# Patient Record
Sex: MALE | Race: WHITE | Employment: OTHER | URBAN - METROPOLITAN AREA
[De-identification: names, ages, dates, MRNs, and addresses within clinical notes are randomized per-mention and may not be internally consistent; named-entity substitution may affect disease eponyms.]

---

## 2017-01-23 ENCOUNTER — TELEPHONE (OUTPATIENT)
Dept: FAMILY MEDICINE | Facility: OTHER | Age: 82
End: 2017-01-23

## 2017-01-24 NOTE — TELEPHONE ENCOUNTER
Reason for call:  Form  Reason for Call:  Form, our goal is to have forms completed with 72 hours, however, some forms may require a visit or additional information.    Type of letter, form or note:  medical    Who is the form from?: [employer] (if other please explain)    Where did the form come from: form was faxed in    What clinic location was the form placed at?: [facility] - [phone]    Where the form was placed: [facility]    What number is listed as a contact on the form?:  Fax [phone]       Additional comments: please complete and fax back    Call taken on 1/23/2017 at 6:31 PM by Swapna Thao

## 2017-01-26 NOTE — TELEPHONE ENCOUNTER
Forms have been completed, signed, faxed/mailed, and sent to scanning.  Kiara Do CMA ([facility])

## 2017-02-27 DIAGNOSIS — K21.9 GASTROESOPHAGEAL REFLUX DISEASE WITHOUT ESOPHAGITIS: ICD-10-CM

## 2017-02-27 NOTE — TELEPHONE ENCOUNTER
Pantoprazole 40 mg      Last Written Prescription Date: 05/05/2016  Last Fill Quantity: 90,  # refills: 2   Last Office Visit with [facility], [facility] or [facility] prescribing provider: 10/14/2016

## 2017-03-01 RX ORDER — PANTOPRAZOLE SODIUM 40 MG/1
TABLET, DELAYED RELEASE ORAL
Qty: 90 TABLET | Refills: 2 | Status: SHIPPED | OUTPATIENT
Start: 2017-03-01

## 2017-03-01 NOTE — TELEPHONE ENCOUNTER
Prescription approved per [facility] Refill Protocol.  Leno Phoenix RN

## 2017-03-08 RX ORDER — OMEPRAZOLE 40 MG/1
40 CAPSULE, DELAYED RELEASE ORAL DAILY
Qty: 90 CAPSULE | Refills: 1 | Status: SHIPPED | OUTPATIENT
Start: 2017-03-08 | End: 2017-10-30 | Stop reason: SINTOL

## 2017-03-08 NOTE — TELEPHONE ENCOUNTER
Sent Rx for omeprazole.  Depending on insurance, it's possible they don't cover this class of medication at all since so many options are available OTC.

## 2017-03-08 NOTE — TELEPHONE ENCOUNTER
Spoke with pt and his spouse, they stated the pharmacy told them that the Rx never was sent to the pharmacy and wanted to know why. Verified with pt's spouse that we sent to correct pharmacy and we did. Spoke with Echo at the pharmacy and she stated they did receive this but what has happened is that this medication is not covered anymore and requires a PA or for provider to change medication. She stated several faxes had been sent about this but I have not been able to find any. Provider please review and advise. Please call pt's wife once this has been done to let her know.    Milena Abebe CMA ([facility])

## 2017-03-09 NOTE — TELEPHONE ENCOUNTER
I called patient's wife and she said she will  the medication for her  in the morning.  No further questions at this time.  Lynn Rivero, CMA

## 2017-04-20 ENCOUNTER — TRANSFERRED RECORDS (OUTPATIENT)
Dept: HEALTH INFORMATION MANAGEMENT | Facility: CLINIC | Age: 82
End: 2017-04-20

## 2017-04-20 LAB
ALT SERPL-CCNC: 22 U/L (ref 0–65)
CREAT SERPL-MCNC: 2.2 MG/DL (ref 0.5–1.5)
GFR SERPL CREATININE-BSD FRML MDRD: 28 ML/MIN/1.73M2
GLUCOSE SERPL-MCNC: 128 MG/DL (ref 70–100)
HBA1C MFR BLD: 6.7 % (ref 4.4–6.5)
POTASSIUM SERPL-SCNC: 5.2 MMOL/L (ref 3.5–5)

## 2017-05-15 DIAGNOSIS — E11.42 DIABETIC POLYNEUROPATHY ASSOCIATED WITH TYPE 2 DIABETES MELLITUS (H): ICD-10-CM

## 2017-05-15 NOTE — TELEPHONE ENCOUNTER
Test strips      Last Written Prescription Date: 10/14/16  Last Fill Quantity: 100,  # refills: 3   Last Office Visit with [facility], [facility] or [facility] prescribing provider: 10/14/16

## 2017-05-16 RX ORDER — BLOOD SUGAR DIAGNOSTIC
STRIP MISCELLANEOUS
Qty: 100 EACH | Refills: 1 | Status: SHIPPED | OUTPATIENT
Start: 2017-05-16 | End: 2017-08-28

## 2017-05-16 NOTE — TELEPHONE ENCOUNTER
Prescription approved per [facility] Refill Protocol.  Leno Phoenix, RN, BSN

## 2017-08-28 DIAGNOSIS — E11.42 DIABETIC POLYNEUROPATHY ASSOCIATED WITH TYPE 2 DIABETES MELLITUS (H): ICD-10-CM

## 2017-08-29 NOTE — TELEPHONE ENCOUNTER
ACCU-CHEK BAL PLUS test strip      Last Written Prescription Date: 05/16/17  Last Fill Quantity: 100,  # refills: 1   Last Office Visit with [facility], [facility] or [facility] prescribing provider: 10/14/16

## 2017-08-30 RX ORDER — BLOOD SUGAR DIAGNOSTIC
STRIP MISCELLANEOUS
Qty: 100 EACH | Refills: 3 | Status: SHIPPED | OUTPATIENT
Start: 2017-08-30 | End: 2018-03-12

## 2017-08-30 NOTE — TELEPHONE ENCOUNTER
Prescription approved per [facility] Refill Protocol.    Shaneka Rodney, RN, BSN

## 2017-08-30 NOTE — TELEPHONE ENCOUNTER
Spouse calling to check status of request. Patient has only one strip left.     Lilliana Ceballos  Reception/ Scheduling

## 2017-09-12 ENCOUNTER — TRANSFERRED RECORDS (OUTPATIENT)
Dept: HEALTH INFORMATION MANAGEMENT | Facility: CLINIC | Age: 82
End: 2017-09-12

## 2017-10-25 NOTE — PROGRESS NOTES
SUBJECTIVE:                                                    Aston Linn is a 89 year old male who presents to clinic today for the following health issues:  Not taking pravastatin on his med list - would make his legs ache.    HPI    Diabetes Follow-up      Patient is checking blood sugars:  sometimes 200, it's been staying good    Diabetic concerns: other - neuropathy problems     Symptoms of hypoglycemia (low blood sugar): none     Paresthesias (numbness or burning in feet) or sores: Yes numbness     Date of last diabetic eye exam: This year 2017 Fall      Hyperlipidemia Follow-Up      Rate your low fat/cholesterol diet?: not monitoring fat    Taking statin?  No    Other lipid medications/supplements?:  none    Hypertension Follow-up      Outpatient blood pressures are not being checked.    Low Salt Diet: not monitoring salt    Anxiety Follow-Up    Status since last visit: No change    Other associated symptoms:None    Complicating factors:   Significant life event: No   Current substance abuse: None  Depression symptoms: No  No flowsheet data found.    GAD7      Having arm pain bilaterally, especially with abduction.  Has swelling in his chest wall in the mornings.  Pain is relatively new, but his swelling is longstanding.        Had diarrhea from his PPI.  [facility]      COPD Follow-Up    Symptoms are currently: been using his inhaler    Current fatigue or dyspnea with ambulation: stable     Shortness of breath: been using inhaler    Increased or change in Cough/Sputum: No    Fever(s): No    Doesn't walk very far    Any ER/UC or hospital admissions since your last visit? No     History   Smoking Status     Current Some Day Smoker     Packs/day: 0.25     Years: 60.00     Types: Cigarettes   Smokeless Tobacco     Never Used     No results found for: FEV1, UAU9CQV    He still smokes.  Breathing is stable.  On some different inhalers, not sure which ones.    Chronic Kidney  Disease Follow-up      Current NSAID use?  No    [facility]  Gets almost all of his care there, but gets DM testing supplies from us.          Problem list and histories reviewed & adjusted, as indicated.  Additional history: as documented      Current Outpatient Prescriptions   Medication Sig Dispense Refill     ACCU-CHEK BAL PLUS test strip USE TO TEST BLOOD SUGARS 1-3 TIMES DAILY AS DIRECTED. 100 each 3     pantoprazole (PROTONIX) 40 MG EC tablet TAKE ONE TABLET BY MOUTH ONCE DAILY 90 tablet 2     gabapentin (NEURONTIN) 300 MG capsule Take 1 capsule (300 mg) by mouth 2 times daily as needed 90 capsule 1     hydrochlorothiazide (HYDRODIURIL) 25 MG tablet Take 1 tablet (25 mg) by mouth daily 90 tablet 1     clorazepate (TRANXENE) 3.75 MG tablet Take 1 tablet (3.75 mg) by mouth nightly as needed 30 tablet 5     amLODIPine (NORVASC) 5 MG tablet Take 1 tablet (5 mg) by mouth daily 90 tablet 1     ASPIRIN NOT PRESCRIBED, INTENTIONAL, 1 each continuous prn for other Antiplatelet medication not prescribed intentionally due to Uncontrolled hypertension (systolic > 180, diastolic > 100) and Current thienopryridine therapy 0 each 0     losartan (COZAAR) 100 MG tablet Take 0.5 tablets (50 mg) by mouth 2 times daily 30 tablet 1     LORazepam (ATIVAN) 0.5 MG tablet Take 1 tablet (0.5 mg) by mouth nightly as needed for anxiety 20 tablet 0     STATIN NOT PRESCRIBED, INTENTIONAL, Statin not prescribed intentionally due to Intolerance 0 each 0     albuterol (PROVENTIL HFA: VENTOLIN HFA) 108 (90 BASE) MCG/ACT inhaler Inhale 2 puffs into the lungs 4 times daily. 2 Inhaler 0     glipiZIDE (GLUCOTROL) 10 MG tablet Take 1 tablet by mouth 2 times daily (before meals). 180 tablet 1     PLAVIX 75 MG OR TABS 1 TABLET DAILY       Recent Labs   Lab Test 04/20/17  10/14/16   1044 04/20/16  12/02/15   1124   09/10/15   1215  12/03/14   1131  11/20/13   A1C  6.7*  6.6*  6.8  6.8*   --   6.9*  7.4*   < >  7.6*   LDL   --    --    --    129*   --    --   151*   --   185   HDL   --    --    --   36*   --    --   35*   --   37   TRIG   --    --    --   172*   --    --   156*   --   174   ALT  22  14  13  16   < >  15  12   --   31   CR  2.2*  2.47*  2.3  2.61*   < >  2.00*  2.25*   < >  1.9   GFRESTIMATED  28*  25*  27  23*   < >  32*  28*   < >  34   GFRESTBLACK   --   30*   --   28*   < >  38*  33*   < >   --    POTASSIUM  5.2*  5.3  5.0  5.3   < >  4.8  5.5*   < >  5.1   TSH   --    --    --    --    --   2.51  3.08   --    --     < > = values in this interval not displayed.      BP Readings from Last 3 Encounters:   10/30/17 122/70   10/14/16 138/80   05/16/16 195/82    Wt Readings from Last 3 Encounters:   10/30/17 206 lb 3.2 oz (93.5 kg)   10/14/16 211 lb 9.6 oz (96 kg)   05/16/16 200 lb (90.7 kg)                     ROS:  Constitutional, HEENT, cardiovascular, pulmonary, gi and gu systems are negative, except as otherwise noted.  Chills.        OBJECTIVE:   /70 (BP Location: Right arm, Patient Position: Chair, Cuff Size: Adult Large)  Pulse 86  Temp 97.2  F (36.2  C) (Temporal)  Resp 16  Ht 6' (1.829 m)  Wt 206 lb 3.2 oz (93.5 kg)  SpO2 97%  BMI 27.97 kg/m2  Body mass index is 27.97 kg/(m^2).  GENERAL: healthy, alert and no distress  NECK: no adenopathy, no asymmetry, masses, or scars and thyroid normal to palpation  RESP: lungs clear to auscultation - no rales, rhonchi or wheezes  CV: regular rate and rhythm, normal S1 S2, no S3 or S4, no murmur, click or rub, no peripheral edema and peripheral pulses strong  ABDOMEN: soft, nontender, no hepatosplenomegaly, no masses and bowel sounds normal  MS: decreased UE ROM with some impingement present with abduction.  Pt difficult to nail down as to where he's having pain and not really reporting a lot of tenderness to palpation  Diabetic foot exam: normal DP and PT pulses, no trophic changes or ulcerative lesions, PT reduced bilaterally and reduced sensation on both feet    Diagnostic  Test Results:  Results for orders placed or performed in visit on 09/12/17   Eye Exam - HIM Scan    Narrative    See DR. SAHIL TESFAYE       ASSESSMENT/PLAN:     Tobacco Cessation:   reports that he has been smoking Cigarettes.  He has a 15.00 pack-year smoking history. He has never used smokeless tobacco.  Tobacco Cessation Action Plan: Information offered: Patient not interested at this time          ICD-10-CM    1. Type 2 diabetes mellitus with diabetic nephropathy, without long-term current use of insulin (H) E11.21 Lipid panel reflex to direct LDL Fasting     Comprehensive metabolic panel     Hemoglobin A1c     TSH with free T4 reflex     Albumin Random Urine Quantitative with Creat Ratio     Hemoglobin     ORTHO  REFERRAL     FOOT EXAM   2. Chronic obstructive pulmonary disease, unspecified COPD type (H) J44.9 COPD ACTION PLAN   3. Hypertension goal BP (blood pressure) < 130/80 I10 Lipid panel reflex to direct LDL Fasting     Comprehensive metabolic panel     TSH with free T4 reflex   4. CKD (chronic kidney disease) stage 3, GFR 30-59 ml/min N18.3 Lipid panel reflex to direct LDL Fasting     Comprehensive metabolic panel     Hemoglobin A1c     TSH with free T4 reflex     Albumin Random Urine Quantitative with Creat Ratio     Hemoglobin   5. Hyperlipidemia LDL goal <100 E78.5 Lipid panel reflex to direct LDL Fasting   6. Left carotid artery stenosis I65.22 Lipid panel reflex to direct LDL Fasting   7. Tobacco use disorder F17.200 TOBACCO CESSATION - FOR HEALTH MAINTENANCE   8. Need for prophylactic vaccination and inoculation against influenza Z23 FLU VACCINE, INCREASED ANTIGEN, PRESV FREE, AGE 65+ [08923]     ADMIN INFLUENZA (For MEDICARE Patients ONLY) []     1.  Currently Controlled.  Continue current regimen.  Check labs.  Call/return if any problems or questions arise.   2.  Stable. Encouraged smoking cessation: The patient is not interested in this. He is comfortable with his current  symptoms and treatment regimen..  3.  Currently Controlled.  Continue current regimen.  Check labs.  Call/return if any problems or questions arise.   4.  Stable. Continue risk factor reduction and check labs. We'll continue current regimen and comanagement with the [facility].  5.  Patient is not willing to consider statins. He states these make him too weak. We'll check labs.  6.  Patient is resistant to smoking cessation and statin use. He does continue on a baby aspirin and is willing to continue with blood pressure and blood sugar management. We'll continue to follow and assist as able. Consider reimaging next year.  7.  Patient refuses to quit smoking.  8.  Immunized.    Portions of this note were completed using Dragon dictation software.  Although reviewed, there may be typographical and other inadvertent errors that remain.               Patient Instructions   Thank you for visiting [facility]    Let me know if you'd like an injection for your shoulders.  This will temporarily worsen your blood sugars.    Please double check the medication list or bring in your pills to your next visit.    We'll let you know your lab results as soon as we can.     Contact us or return if questions or concerns.     Please see me in 6 months for follow up.     Let me know if you'd like help quitting smoking.    If you had imaging scheduled please refer to your radiology prep sheet.    Appointment    Date_______________     Time_____________    Day:   M TU W TH F    With____________________________    Location_________________________    If you need medication refills, please contact your pharmacy 3 days before your prescriptions runs out. If you are out of refills, your pharmacy will contact contact the clinic.    Contact us or return if questions or concerns.     -Your Care Team:  MD Danielle Padron PA-C Joel De Haan, PA-C Elizabeth McLean, NICA CNP    General information  about your clinic      Clinic hours:     Lab hours:  Phone [phone]  Monday 7:30 am-7 pm    Monday 8:30 am-6:30 pm  Tuesday-Friday 7:30 am-5 pm   Tuesday-Friday 8:30 am-4:30 pm    Pharmacy hours:  Phone [phone]  Monday 8:30 am-7pm  Tuesday-Friday 8:30am-6 pm                                       Mychart assistance [phone]        We would like to hear from you, how was your visit today?    Arlette Hill  Patient Information Supervisor   Patient Care Supervisor  [facility], [facility], [facility]  [phone]         Corky Yang MD, MD  [facility]  Injectable Influenza Immunization Documentation    1.  Is the person to be vaccinated sick today?   No    2. Does the person to be vaccinated have an allergy to a component   of the vaccine?   No  Egg Allergy Algorithm Link    3. Has the person to be vaccinated ever had a serious reaction   to influenza vaccine in the past?   No    4. Has the person to be vaccinated ever had Guillain-Barré syndrome?   No    Form completed by Lynn Muse CMA  Per orders of Dr. Yang, injection of Flu shot given by Lynn Muse. Patient instructed to remain in clinic for 15 minutes afterwards, and to report any adverse reaction to me immediately.

## 2017-10-30 ENCOUNTER — TELEPHONE (OUTPATIENT)
Dept: FAMILY MEDICINE | Facility: OTHER | Age: 82
End: 2017-10-30

## 2017-10-30 ENCOUNTER — OFFICE VISIT (OUTPATIENT)
Dept: FAMILY MEDICINE | Facility: OTHER | Age: 82
End: 2017-10-30
Payer: COMMERCIAL

## 2017-10-30 VITALS
HEART RATE: 86 BPM | WEIGHT: 206.2 LBS | HEIGHT: 72 IN | SYSTOLIC BLOOD PRESSURE: 122 MMHG | TEMPERATURE: 97.2 F | RESPIRATION RATE: 16 BRPM | OXYGEN SATURATION: 97 % | BODY MASS INDEX: 27.93 KG/M2 | DIASTOLIC BLOOD PRESSURE: 70 MMHG

## 2017-10-30 DIAGNOSIS — E11.21 TYPE 2 DIABETES MELLITUS WITH DIABETIC NEPHROPATHY, WITHOUT LONG-TERM CURRENT USE OF INSULIN (H): Primary | ICD-10-CM

## 2017-10-30 DIAGNOSIS — I65.22 LEFT CAROTID ARTERY STENOSIS: ICD-10-CM

## 2017-10-30 DIAGNOSIS — I10 HYPERTENSION GOAL BP (BLOOD PRESSURE) < 130/80: ICD-10-CM

## 2017-10-30 DIAGNOSIS — F17.200 TOBACCO USE DISORDER: ICD-10-CM

## 2017-10-30 DIAGNOSIS — E78.5 HYPERLIPIDEMIA LDL GOAL <100: ICD-10-CM

## 2017-10-30 DIAGNOSIS — J44.9 CHRONIC OBSTRUCTIVE PULMONARY DISEASE, UNSPECIFIED COPD TYPE (H): ICD-10-CM

## 2017-10-30 DIAGNOSIS — N18.30 CKD (CHRONIC KIDNEY DISEASE) STAGE 3, GFR 30-59 ML/MIN (H): ICD-10-CM

## 2017-10-30 DIAGNOSIS — Z23 NEED FOR PROPHYLACTIC VACCINATION AND INOCULATION AGAINST INFLUENZA: ICD-10-CM

## 2017-10-30 LAB
ALBUMIN SERPL-MCNC: 3.7 G/DL (ref 3.4–5)
ALP SERPL-CCNC: 103 U/L (ref 40–150)
ALT SERPL W P-5'-P-CCNC: 16 U/L (ref 0–70)
ANION GAP SERPL CALCULATED.3IONS-SCNC: 10 MMOL/L (ref 3–14)
AST SERPL W P-5'-P-CCNC: 12 U/L (ref 0–45)
BILIRUB SERPL-MCNC: 0.4 MG/DL (ref 0.2–1.3)
BUN SERPL-MCNC: 57 MG/DL (ref 7–30)
CALCIUM SERPL-MCNC: 8.4 MG/DL (ref 8.5–10.1)
CHLORIDE SERPL-SCNC: 108 MMOL/L (ref 94–109)
CHOLEST SERPL-MCNC: 214 MG/DL
CO2 SERPL-SCNC: 20 MMOL/L (ref 20–32)
CREAT SERPL-MCNC: 2.51 MG/DL (ref 0.66–1.25)
CREAT UR-MCNC: 114 MG/DL
GFR SERPL CREATININE-BSD FRML MDRD: 24 ML/MIN/1.7M2
GLUCOSE SERPL-MCNC: 115 MG/DL (ref 70–99)
HBA1C MFR BLD: 6.3 % (ref 4.3–6)
HDLC SERPL-MCNC: 41 MG/DL
HGB BLD-MCNC: 10.8 G/DL (ref 13.3–17.7)
LDLC SERPL CALC-MCNC: 138 MG/DL
MICROALBUMIN UR-MCNC: 229 MG/L
MICROALBUMIN/CREAT UR: 200.88 MG/G CR (ref 0–17)
NONHDLC SERPL-MCNC: 173 MG/DL
POTASSIUM SERPL-SCNC: 5.2 MMOL/L (ref 3.4–5.3)
PROT SERPL-MCNC: 7.6 G/DL (ref 6.8–8.8)
SODIUM SERPL-SCNC: 138 MMOL/L (ref 133–144)
TRIGL SERPL-MCNC: 174 MG/DL
TSH SERPL DL<=0.005 MIU/L-ACNC: 2.99 MU/L (ref 0.4–4)

## 2017-10-30 PROCEDURE — 99214 OFFICE O/P EST MOD 30 MIN: CPT | Mod: 25 | Performed by: FAMILY MEDICINE

## 2017-10-30 PROCEDURE — 84443 ASSAY THYROID STIM HORMONE: CPT | Performed by: FAMILY MEDICINE

## 2017-10-30 PROCEDURE — 99207 C FOOT EXAM  NO CHARGE: CPT | Performed by: FAMILY MEDICINE

## 2017-10-30 PROCEDURE — 36415 COLL VENOUS BLD VENIPUNCTURE: CPT | Performed by: FAMILY MEDICINE

## 2017-10-30 PROCEDURE — 85018 HEMOGLOBIN: CPT | Performed by: FAMILY MEDICINE

## 2017-10-30 PROCEDURE — G0008 ADMIN INFLUENZA VIRUS VAC: HCPCS | Performed by: FAMILY MEDICINE

## 2017-10-30 PROCEDURE — 80061 LIPID PANEL: CPT | Performed by: FAMILY MEDICINE

## 2017-10-30 PROCEDURE — 80053 COMPREHEN METABOLIC PANEL: CPT | Performed by: FAMILY MEDICINE

## 2017-10-30 PROCEDURE — 90662 IIV NO PRSV INCREASED AG IM: CPT | Performed by: FAMILY MEDICINE

## 2017-10-30 PROCEDURE — 83036 HEMOGLOBIN GLYCOSYLATED A1C: CPT | Performed by: FAMILY MEDICINE

## 2017-10-30 PROCEDURE — 82043 UR ALBUMIN QUANTITATIVE: CPT | Performed by: FAMILY MEDICINE

## 2017-10-30 ASSESSMENT — PAIN SCALES - GENERAL: PAINLEVEL: MODERATE PAIN (5)

## 2017-10-30 NOTE — MR AVS SNAPSHOT
After Visit Summary   10/30/2017    Aston Linn    MRN: [id number]           Patient Information     Date Of Birth          9/21/1926        Visit Information        Provider Department      10/30/2017 9:00 AM Corky Yang MD [facility]        Today's Diagnoses     Tobacco use disorder    -  1    Need for prophylactic vaccination and inoculation against influenza        Chronic obstructive pulmonary disease, unspecified COPD type (H)        Type 2 diabetes mellitus with diabetic nephropathy, without long-term current use of insulin (H)        CKD (chronic kidney disease) stage 3, GFR 30-59 ml/min        Hyperlipidemia LDL goal <100        Left carotid artery stenosis        Hypertension goal BP (blood pressure) < 130/80          Care Instructions    Thank you for visiting [facility]    Let me know if you'd like an injection for your shoulders.  This will temporarily worsen your blood sugars.    Please double check the medication list or bring in your pills to your next visit.    We'll let you know your lab results as soon as we can.     Contact us or return if questions or concerns.     Please see me in 6 months for follow up.     Let me know if you'd like help quitting smoking.    If you had imaging scheduled please refer to your radiology prep sheet.    Appointment    Date_______________     Time_____________    Day:   M TU W TH F    With____________________________    Location_________________________    If you need medication refills, please contact your pharmacy 3 days before your prescriptions runs out. If you are out of refills, your pharmacy will contact contact the clinic.    Contact us or return if questions or concerns.     -Your Care Team:  MD Danielle Padron PA-C Joel De Haan, PA-C Elizabeth McLean, NICA ACEVEDO    General information about your clinic      Clinic hours:     Lab hours:  Phone [phone]  Monday 7:30  am-7 pm    Monday 8:30 am-6:30 pm  Tuesday-Friday 7:30 am-5 pm   Tuesday-Friday 8:30 am-4:30 pm    Pharmacy hours:  Phone [phone]  Monday 8:30 am-7pm  Tuesday-Friday 8:30am-6 pm                                       Cristianhart assistance [phone]        We would like to hear from you, how was your visit today?    Arlette Hill  Patient Information Supervisor   Patient Care Supervisor  [facility] [place], and [facility] [place], and [facility]  [phone]             Follow-ups after your visit        Additional Services     ORTHO  REFERRAL       [facility] is referring you to the Orthopedic  Services at [facility].       The  Representative will assist you in the coordination of your Orthopedic and Musculoskeletal Care as prescribed by your physician.    The  Representative will call you within 1 business day to help schedule your appointment, or you may contact the  Representative at:    All areas ~ [phone]     Type of Referral : [facility]       Timeframe requested: Routine    Coverage of these services is subject to the terms and limitations of your health insurance plan.  Please call member services at your health plan with any benefit or coverage questions.      If X-rays, CT or MRI's have been performed, please contact the facility where they were done to arrange for , prior to your scheduled appointment.  Please bring this referral request to your appointment and present it to your specialist.                  Who to contact     If you have questions or need follow up information about today's clinic visit or your schedule please contact [facility] directly at [phone].  Normal or non-critical lab and imaging results will be communicated to you by MyChart, letter or phone within 4 business  "days after the clinic has received the results. If you do not hear from us within 7 days, please contact the clinic through Qomuty or phone. If you have a critical or abnormal lab result, we will notify you by phone as soon as possible.  Submit refill requests through [employer]om[employer] or call your pharmacy and they will forward the refill request to us. Please allow 3 business days for your refill to be completed.          Additional Information About Your Visit        Qomuty Information     Qomuty lets you send messages to your doctor, view your test results, renew your prescriptions, schedule appointments and more. To sign up, go to [email or web address] . Click on \"Log in\" on the left side of the screen, which will take you to the Welcome page. Then click on \"Sign up Now\" on the right side of the page.     You will be asked to enter the access code listed below, as well as some personal information. Please follow the directions to create your username and password.     Your access code is: [email or web address]  Expires: 2018  9:43 AM     Your access code will  in 90 days. If you need help or a new code, please call your [facility] clinic or [phone].        Care EveryWhere ID     This is your Care EveryWhere ID. This could be used by other organizations to access your [facility] medical records  [id number]        Your Vitals Were     Pulse Temperature Respirations Height Pulse Oximetry BMI (Body Mass Index)    86 97.2  F (36.2  C) (Temporal) 16 6' (1.829 m) 97% 27.97 kg/m2       Blood Pressure from Last 3 Encounters:   10/30/17 122/70   10/14/16 138/80   16 195/82    Weight from Last 3 Encounters:   10/30/17 206 lb 3.2 oz (93.5 kg)   10/14/16 211 lb 9.6 oz (96 kg)   16 200 lb (90.7 kg)              We Performed the Following     ADMIN INFLUENZA (For MEDICARE Patients ONLY) []     Albumin Random Urine Quantitative with Creat Ratio     Comprehensive metabolic panel     COPD ACTION PLAN     FLU " VACCINE, INCREASED ANTIGEN, PRESV FREE, AGE 65+ [39820]     FOOT EXAM     Hemoglobin A1c     Hemoglobin     Lipid panel reflex to direct LDL Fasting     ORTHO  REFERRAL     TOBACCO CESSATION - FOR HEALTH MAINTENANCE     TSH with free T4 reflex          Today's Medication Changes          These changes are accurate as of: 10/30/17  9:43 AM.  If you have any questions, ask your nurse or doctor.               Stop taking these medicines if you haven't already. Please contact your care team if you have questions.     omeprazole 40 MG capsule   Commonly known as:  priLOSEC   Stopped by:  Corky Yang MD           pravastatin 10 MG tablet   Commonly known as:  PRAVACHOL   Stopped by:  Corky Yang MD                    Primary Care Provider Office Phone # Fax #    Corky Yang [phone] [address] [place] [address]        Equal Access to Services     [facility]: Hadii lino ku hadasho Soomaali, waaxda luqadaha, qaybta kaalmada adeegyada, waxay megan haysurya ramírez . So [facility] [phone].    ATENCIÓN: Si habla español, tiene a martinez disposición servicios gratuitos de asistencia lingüística. [facility] [phone].    We comply with applicable federal civil rights laws and [place] laws. We do not discriminate on the basis of race, color, national origin, age, disability, sex, sexual orientation, or gender identity.            Thank you!     Thank you for choosing [facility]  for your care. Our goal is always to provide you with excellent care. Hearing back from our patients is one way we can continue to improve our services. Please take a few minutes to complete the written survey that you may receive in the mail after your visit with us. Thank you!             Your Updated Medication List - Protect others around you: Learn how to safely use, store and throw away your medicines at www.disposemymeds.org.          This list is  accurate as of: 10/30/17  9:43 AM.  Always use your most recent med list.                   Brand Name Dispense Instructions for use Diagnosis    ACCU-CHEK BAL PLUS test strip   Generic drug:  blood glucose monitoring     100 each    USE TO TEST BLOOD SUGARS 1-3 TIMES DAILY AS DIRECTED.    Diabetic polyneuropathy associated with type 2 diabetes mellitus (H)       albuterol 108 (90 BASE) MCG/ACT Inhaler    PROAIR HFA/PROVENTIL HFA/VENTOLIN HFA    2 Inhaler    Inhale 2 puffs into the lungs 4 times daily.    Potassium serum increased       amLODIPine 5 MG tablet    NORVASC    90 tablet    Take 1 tablet (5 mg) by mouth daily    Hypertension goal BP (blood pressure) < 130/80       ASPIRIN NOT PRESCRIBED    INTENTIONAL    0 each    1 each continuous prn for other Antiplatelet medication not prescribed intentionally due to Uncontrolled hypertension (systolic > 180, diastolic > 100) and Current thienopryridine therapy    Diabetic polyneuropathy associated with type 2 diabetes mellitus (H)       clorazepate 3.75 MG tablet    TRANXENE-T    30 tablet    Take 1 tablet (3.75 mg) by mouth nightly as needed    Psychophysiological insomnia       gabapentin 300 MG capsule    NEURONTIN    90 capsule    Take 1 capsule (300 mg) by mouth 2 times daily as needed    Diabetic polyneuropathy associated with type 2 diabetes mellitus (H)       glipiZIDE 10 MG tablet    GLUCOTROL    180 tablet    Take 1 tablet by mouth 2 times daily (before meals).        hydrochlorothiazide 25 MG tablet    HYDRODIURIL    90 tablet    Take 1 tablet (25 mg) by mouth daily    CKD (chronic kidney disease) stage 3, GFR 30-59 ml/min, Hypertension goal BP (blood pressure) < 130/80       LORazepam 0.5 MG tablet    ATIVAN    20 tablet    Take 1 tablet (0.5 mg) by mouth nightly as needed for anxiety    Anxiety state, unspecified       losartan 100 MG tablet    COZAAR    30 tablet    Take 0.5 tablets (50 mg) by mouth 2 times daily    Type 2 diabetes, HbA1C goal < 8%  (H), Hypertension, goal below 140/90       pantoprazole 40 MG EC tablet    PROTONIX    90 tablet    TAKE ONE TABLET BY MOUTH ONCE DAILY    Gastroesophageal reflux disease without esophagitis       PLAVIX 75 MG tablet   Generic drug:  clopidogrel      1 TABLET DAILY        STATIN NOT PRESCRIBED (INTENTIONAL)     0 each    Statin not prescribed intentionally due to Intolerance    Type 2 diabetes, HbA1C goal < 8% (H)

## 2017-10-30 NOTE — TELEPHONE ENCOUNTER
Attempted to reach pt but number was busy, will try again later.    Notes Recorded by Corky Yang MD on 10/30/2017 at 3:13 PM  All of your labs were normal for you except you have worse anemia.  Still with poor kidney function and elevated cholesterol.  If you have changed your mind about taking a cholesterol medication, let me know.  If you're getting dizzy or more tired, we should recheck your blood counts.    Have a nice day!    Dr. Trey Abebe CMA ([facility])

## 2017-10-30 NOTE — NURSING NOTE
Chief Complaint   Patient presents with     Recheck Medication     Panel Management     See chart prep       Initial /70 (BP Location: Right arm, Patient Position: Chair, Cuff Size: Adult Large)  Pulse 86  Temp 97.2  F (36.2  C) (Temporal)  Resp 16  Ht 6' (1.829 m)  Wt 206 lb 3.2 oz (93.5 kg)  SpO2 97%  BMI 27.97 kg/m2 Estimated body mass index is 27.97 kg/(m^2) as calculated from the following:    Height as of this encounter: 6' (1.829 m).    Weight as of this encounter: 206 lb 3.2 oz (93.5 kg).  Medication Reconciliation: complete  Lynn Muse, CMA

## 2017-10-30 NOTE — PROGRESS NOTES
All of your labs were normal for you except you have worse anemia.  Still with poor kidney function and elevated cholesterol.  If you have changed your mind about taking a cholesterol medication, let me know.  If you're getting dizzy or more tired, we should recheck your blood counts.    Have a nice day!    Dr. Yang

## 2017-10-30 NOTE — LETTER
October 31, 2017      Aston Linn  [address] [place] [facility] [address]        Dear ,    We are writing to inform you of your test results.    All of your labs were normal for you except you have worse anemia.  Still with poor kidney function and elevated cholesterol.  If you have changed your mind about taking a cholesterol medication, let me know.  If you're getting dizzy or more tired, we should recheck your blood counts.    Resulted Orders   Lipid panel reflex to direct LDL Fasting   Result Value Ref Range    Cholesterol 214 (H) <200 mg/dL      Comment:      Desirable:       <200 mg/dl    Triglycerides 174 (H) <150 mg/dL      Comment:      Borderline high:  150-199 mg/dl  High:             200-499 mg/dl  Very high:       >499 mg/dl      HDL Cholesterol 41 >39 mg/dL    LDL Cholesterol Calculated 138 (H) <100 mg/dL      Comment:      Above desirable:  100-129 mg/dl  Borderline High:  130-159 mg/dL  High:             160-189 mg/dL  Very high:       >189 mg/dl      Non HDL Cholesterol 173 (H) <130 mg/dL      Comment:      Above Desirable:  130-159 mg/dl  Borderline high:  160-189 mg/dl  High:             190-219 mg/dl  Very high:       >219 mg/dl     Comprehensive metabolic panel   Result Value Ref Range    Sodium 138 133 - 144 mmol/L    Potassium 5.2 3.4 - 5.3 mmol/L    Chloride 108 94 - 109 mmol/L    Carbon Dioxide 20 20 - 32 mmol/L    Anion Gap 10 3 - 14 mmol/L    Glucose 115 (H) 70 - 99 mg/dL    Urea Nitrogen 57 (H) 7 - 30 mg/dL    Creatinine 2.51 (H) 0.66 - 1.25 mg/dL    GFR Estimate 24 (L) >60 mL/min/1.7m2      Comment:      Non  GFR Calc    GFR Estimate If Black 29 (L) >60 mL/min/1.7m2      Comment:       GFR Calc    Calcium 8.4 (L) 8.5 - 10.1 mg/dL    Bilirubin Total 0.4 0.2 - 1.3 mg/dL    Albumin 3.7 3.4 - 5.0 g/dL    Protein Total 7.6 6.8 - 8.8 g/dL    Alkaline Phosphatase 103 40 - 150 U/L    ALT 16 0 - 70 U/L    AST 12 0 - 45 U/L   Hemoglobin A1c    Result Value Ref Range    Hemoglobin A1C 6.3 (H) 4.3 - 6.0 %   TSH with free T4 reflex   Result Value Ref Range    TSH 2.99 0.40 - 4.00 mU/L   Albumin Random Urine Quantitative with Creat Ratio   Result Value Ref Range    Creatinine Urine 114 mg/dL    Albumin Urine mg/L 229 mg/L    Albumin Urine mg/g Cr 200.88 (H) 0 - 17 mg/g Cr   Hemoglobin   Result Value Ref Range    Hemoglobin 10.8 (L) 13.3 - 17.7 g/dL       If you have any questions or concerns, please call the clinic at the number listed above.       Sincerely,        Corky Yang MD, MD

## 2017-10-30 NOTE — PATIENT INSTRUCTIONS
Thank you for visiting [facility]    Let me know if you'd like an injection for your shoulders.  This will temporarily worsen your blood sugars.    Please double check the medication list or bring in your pills to your next visit.    We'll let you know your lab results as soon as we can.     Contact us or return if questions or concerns.     Please see me in 6 months for follow up.     Let me know if you'd like help quitting smoking.    If you had imaging scheduled please refer to your radiology prep sheet.    Appointment    Date_______________     Time_____________    Day:   M TU W TH F    With____________________________    Location_________________________    If you need medication refills, please contact your pharmacy 3 days before your prescriptions runs out. If you are out of refills, your pharmacy will contact contact the clinic.    Contact us or return if questions or concerns.     -Your Care Team:  MD Danielle Padron PA-C Joel De Haan, PA-C Elizabeth McLean, APRN CNP    General information about your clinic      Clinic hours:     Lab hours:  Phone [phone]  Monday 7:30 am-7 pm    Monday 8:30 am-6:30 pm  Tuesday-Friday 7:30 am-5 pm   Tuesday-Friday 8:30 am-4:30 pm    Pharmacy hours:  Phone [phone]  Monday 8:30 am-7pm  Tuesday-Friday 8:30am-6 pm                                       Mychart assistance [phone]        We would like to hear from you, how was your visit today?    Arlette Hill  Patient Information Supervisor   Patient Care Supervisor  [facility], and [facility], and [facility]  [phone]

## 2017-10-31 NOTE — TELEPHONE ENCOUNTER
Results given to patients wife, c2c on fileand also mailed  Closing encounter  Desiree Grant RT (R)

## 2017-12-13 ENCOUNTER — TELEPHONE (OUTPATIENT)
Dept: FAMILY MEDICINE | Facility: OTHER | Age: 82
End: 2017-12-13

## 2017-12-13 NOTE — TELEPHONE ENCOUNTER
Olodaterol medication pended for pt's med list.  Please remove albuterol sulfate and amlodipine besylate.  Pt no longer takes.  Lynn Muse, CMA

## 2018-01-01 ENCOUNTER — TELEPHONE (OUTPATIENT)
Dept: FAMILY MEDICINE | Facility: OTHER | Age: 83
End: 2018-01-01

## 2018-01-01 ENCOUNTER — OFFICE VISIT (OUTPATIENT)
Dept: PODIATRY | Facility: CLINIC | Age: 83
End: 2018-01-01
Payer: COMMERCIAL

## 2018-01-01 ENCOUNTER — HOSPITAL ENCOUNTER (OUTPATIENT)
Dept: ULTRASOUND IMAGING | Facility: CLINIC | Age: 83
End: 2018-09-14
Attending: SURGERY
Payer: MEDICARE

## 2018-01-01 ENCOUNTER — HOSPITAL ENCOUNTER (OUTPATIENT)
Dept: WOUND CARE | Facility: CLINIC | Age: 83
Discharge: HOME | End: 2018-11-12
Attending: PODIATRIST | Admitting: PODIATRIST
Payer: MEDICARE

## 2018-01-01 ENCOUNTER — OFFICE VISIT (OUTPATIENT)
Dept: FAMILY MEDICINE | Facility: OTHER | Age: 83
End: 2018-01-01
Payer: COMMERCIAL

## 2018-01-01 ENCOUNTER — OFFICE VISIT (OUTPATIENT)
Dept: OTHER | Facility: CLINIC | Age: 83
End: 2018-01-01
Attending: SURGERY
Payer: MEDICARE

## 2018-01-01 ENCOUNTER — HOSPITAL ENCOUNTER (OUTPATIENT)
Dept: ULTRASOUND IMAGING | Facility: CLINIC | Age: 83
Discharge: HOME | End: 2018-09-14
Attending: SURGERY | Admitting: SURGERY
Payer: MEDICARE

## 2018-01-01 ENCOUNTER — HOSPITAL ENCOUNTER (OUTPATIENT)
Dept: WOUND CARE | Facility: CLINIC | Age: 83
Discharge: HOME | End: 2018-10-15
Attending: FAMILY MEDICINE | Admitting: FAMILY MEDICINE
Payer: MEDICARE

## 2018-01-01 ENCOUNTER — HOSPITAL ENCOUNTER (OUTPATIENT)
Dept: WOUND CARE | Facility: CLINIC | Age: 83
Discharge: HOME | End: 2018-12-10
Attending: PODIATRIST | Admitting: PODIATRIST
Payer: MEDICARE

## 2018-01-01 VITALS
BODY MASS INDEX: 29.54 KG/M2 | TEMPERATURE: 97.4 F | WEIGHT: 211 LBS | DIASTOLIC BLOOD PRESSURE: 82 MMHG | HEIGHT: 71 IN | SYSTOLIC BLOOD PRESSURE: 160 MMHG

## 2018-01-01 VITALS
SYSTOLIC BLOOD PRESSURE: 156 MMHG | HEART RATE: 90 BPM | TEMPERATURE: 96.5 F | OXYGEN SATURATION: 95 % | RESPIRATION RATE: 16 BRPM | DIASTOLIC BLOOD PRESSURE: 70 MMHG

## 2018-01-01 VITALS — HEART RATE: 77 BPM | SYSTOLIC BLOOD PRESSURE: 191 MMHG | DIASTOLIC BLOOD PRESSURE: 81 MMHG

## 2018-01-01 VITALS
DIASTOLIC BLOOD PRESSURE: 70 MMHG | HEART RATE: 90 BPM | OXYGEN SATURATION: 92 % | TEMPERATURE: 96.8 F | SYSTOLIC BLOOD PRESSURE: 158 MMHG | RESPIRATION RATE: 20 BRPM

## 2018-01-01 DIAGNOSIS — E78.5 HYPERLIPIDEMIA LDL GOAL <100: ICD-10-CM

## 2018-01-01 DIAGNOSIS — E11.40 TYPE 2 DIABETES MELLITUS WITH DIABETIC NEUROPATHY, WITHOUT LONG-TERM CURRENT USE OF INSULIN (H): ICD-10-CM

## 2018-01-01 DIAGNOSIS — J44.9 CHRONIC OBSTRUCTIVE PULMONARY DISEASE, UNSPECIFIED COPD TYPE (H): ICD-10-CM

## 2018-01-01 DIAGNOSIS — S91.301A OPEN WOUND OF HEEL, RIGHT, INITIAL ENCOUNTER: ICD-10-CM

## 2018-01-01 DIAGNOSIS — I73.9 PERIPHERAL VASCULAR DISEASE OF LOWER EXTREMITY WITH ULCERATION (H): Primary | ICD-10-CM

## 2018-01-01 DIAGNOSIS — N18.4 CKD (CHRONIC KIDNEY DISEASE) STAGE 4, GFR 15-29 ML/MIN (H): ICD-10-CM

## 2018-01-01 DIAGNOSIS — I65.23 BILATERAL CAROTID ARTERY STENOSIS: ICD-10-CM

## 2018-01-01 DIAGNOSIS — I73.9 PVD (PERIPHERAL VASCULAR DISEASE) (H): ICD-10-CM

## 2018-01-01 DIAGNOSIS — L97.411 ULCER OF RIGHT HEEL AND MIDFOOT, LIMITED TO BREAKDOWN OF SKIN (H): ICD-10-CM

## 2018-01-01 DIAGNOSIS — I10 HYPERTENSION GOAL BP (BLOOD PRESSURE) < 130/80: ICD-10-CM

## 2018-01-01 DIAGNOSIS — I73.9 PAD (PERIPHERAL ARTERY DISEASE) (H): ICD-10-CM

## 2018-01-01 DIAGNOSIS — I71.40 ABDOMINAL AORTIC ANEURYSM (AAA) WITHOUT RUPTURE (H): ICD-10-CM

## 2018-01-01 DIAGNOSIS — E11.9 TYPE 2 DIABETES, HBA1C GOAL < 8% (H): ICD-10-CM

## 2018-01-01 DIAGNOSIS — E11.21 TYPE 2 DIABETES MELLITUS WITH DIABETIC NEPHROPATHY, WITHOUT LONG-TERM CURRENT USE OF INSULIN (H): ICD-10-CM

## 2018-01-01 DIAGNOSIS — I73.9 PERIPHERAL VASCULAR DISEASE (H): Primary | ICD-10-CM

## 2018-01-01 DIAGNOSIS — E11.42 DIABETIC POLYNEUROPATHY ASSOCIATED WITH TYPE 2 DIABETES MELLITUS (H): ICD-10-CM

## 2018-01-01 DIAGNOSIS — F17.200 TOBACCO USE DISORDER: ICD-10-CM

## 2018-01-01 DIAGNOSIS — S91.301A OPEN WOUND OF HEEL, RIGHT, INITIAL ENCOUNTER: Primary | ICD-10-CM

## 2018-01-01 DIAGNOSIS — I10 HYPERTENSION, GOAL BELOW 140/90: ICD-10-CM

## 2018-01-01 DIAGNOSIS — Z23 NEED FOR PROPHYLACTIC VACCINATION AND INOCULATION AGAINST INFLUENZA: ICD-10-CM

## 2018-01-01 DIAGNOSIS — I65.22 LEFT CAROTID ARTERY STENOSIS: ICD-10-CM

## 2018-01-01 DIAGNOSIS — L97.909 PERIPHERAL VASCULAR DISEASE OF LOWER EXTREMITY WITH ULCERATION (H): Primary | ICD-10-CM

## 2018-01-01 LAB
ALBUMIN SERPL-MCNC: 3.6 G/DL (ref 3.4–5)
ALBUMIN SERPL-MCNC: 3.7 G/DL (ref 3.4–5)
ALP SERPL-CCNC: 100 U/L (ref 40–150)
ALP SERPL-CCNC: 98 U/L (ref 40–150)
ALT SERPL W P-5'-P-CCNC: 12 U/L (ref 0–70)
ALT SERPL W P-5'-P-CCNC: 13 U/L (ref 0–70)
ANION GAP SERPL CALCULATED.3IONS-SCNC: 8 MMOL/L (ref 3–14)
ANION GAP SERPL CALCULATED.3IONS-SCNC: 9 MMOL/L (ref 3–14)
AST SERPL W P-5'-P-CCNC: 9 U/L (ref 0–45)
AST SERPL W P-5'-P-CCNC: 9 U/L (ref 0–45)
BILIRUB SERPL-MCNC: 0.2 MG/DL (ref 0.2–1.3)
BILIRUB SERPL-MCNC: 0.4 MG/DL (ref 0.2–1.3)
BUN SERPL-MCNC: 57 MG/DL (ref 7–30)
BUN SERPL-MCNC: 61 MG/DL (ref 7–30)
CALCIUM SERPL-MCNC: 8.3 MG/DL (ref 8.5–10.1)
CALCIUM SERPL-MCNC: 8.4 MG/DL (ref 8.5–10.1)
CHLORIDE SERPL-SCNC: 111 MMOL/L (ref 94–109)
CHLORIDE SERPL-SCNC: 111 MMOL/L (ref 94–109)
CHOLEST SERPL-MCNC: 160 MG/DL
CO2 SERPL-SCNC: 19 MMOL/L (ref 20–32)
CO2 SERPL-SCNC: 22 MMOL/L (ref 20–32)
CREAT SERPL-MCNC: 2.34 MG/DL (ref 0.66–1.25)
CREAT SERPL-MCNC: 2.39 MG/DL (ref 0.66–1.25)
CREAT UR-MCNC: 78 MG/DL
GFR SERPL CREATININE-BSD FRML MDRD: 26 ML/MIN/1.7M2
GFR SERPL CREATININE-BSD FRML MDRD: 26 ML/MIN/1.7M2
GLUCOSE SERPL-MCNC: 133 MG/DL (ref 70–99)
GLUCOSE SERPL-MCNC: 219 MG/DL (ref 70–99)
HBA1C MFR BLD: 6.3 % (ref 0–5.6)
HDLC SERPL-MCNC: 33 MG/DL
LDLC SERPL CALC-MCNC: 87 MG/DL
MICROALBUMIN UR-MCNC: 272 MG/L
MICROALBUMIN/CREAT UR: 346.5 MG/G CR (ref 0–17)
NONHDLC SERPL-MCNC: 127 MG/DL
POTASSIUM SERPL-SCNC: 5.2 MMOL/L (ref 3.4–5.3)
POTASSIUM SERPL-SCNC: 5.3 MMOL/L (ref 3.4–5.3)
PROT SERPL-MCNC: 7.7 G/DL (ref 6.8–8.8)
PROT SERPL-MCNC: 7.7 G/DL (ref 6.8–8.8)
SODIUM SERPL-SCNC: 139 MMOL/L (ref 133–144)
SODIUM SERPL-SCNC: 141 MMOL/L (ref 133–144)
TRIGL SERPL-MCNC: 199 MG/DL

## 2018-01-01 PROCEDURE — 36415 COLL VENOUS BLD VENIPUNCTURE: CPT | Performed by: FAMILY MEDICINE

## 2018-01-01 PROCEDURE — G0463 HOSPITAL OUTPT CLINIC VISIT: HCPCS

## 2018-01-01 PROCEDURE — G0008 ADMIN INFLUENZA VIRUS VAC: HCPCS | Performed by: FAMILY MEDICINE

## 2018-01-01 PROCEDURE — 99204 OFFICE O/P NEW MOD 45 MIN: CPT | Mod: ZP | Performed by: SURGERY

## 2018-01-01 PROCEDURE — 90662 IIV NO PRSV INCREASED AG IM: CPT | Performed by: FAMILY MEDICINE

## 2018-01-01 PROCEDURE — 93880 EXTRACRANIAL BILAT STUDY: CPT

## 2018-01-01 PROCEDURE — 80053 COMPREHEN METABOLIC PANEL: CPT | Performed by: FAMILY MEDICINE

## 2018-01-01 PROCEDURE — 80061 LIPID PANEL: CPT | Performed by: FAMILY MEDICINE

## 2018-01-01 PROCEDURE — 99215 OFFICE O/P EST HI 40 MIN: CPT | Mod: 25 | Performed by: FAMILY MEDICINE

## 2018-01-01 PROCEDURE — G0463 HOSPITAL OUTPT CLINIC VISIT: HCPCS | Mod: 25

## 2018-01-01 PROCEDURE — 99214 OFFICE O/P EST MOD 30 MIN: CPT | Performed by: FAMILY MEDICINE

## 2018-01-01 PROCEDURE — 82043 UR ALBUMIN QUANTITATIVE: CPT | Performed by: FAMILY MEDICINE

## 2018-01-01 PROCEDURE — 76775 US EXAM ABDO BACK WALL LIM: CPT

## 2018-01-01 PROCEDURE — 83036 HEMOGLOBIN GLYCOSYLATED A1C: CPT | Performed by: FAMILY MEDICINE

## 2018-01-01 PROCEDURE — 99213 OFFICE O/P EST LOW 20 MIN: CPT | Performed by: PODIATRIST

## 2018-01-01 RX ORDER — AMLODIPINE BESYLATE 5 MG/1
5 TABLET ORAL DAILY
Qty: 90 TABLET | Refills: 1 | Status: SHIPPED | OUTPATIENT
Start: 2018-01-01 | End: 2018-01-01

## 2018-01-01 RX ORDER — AMLODIPINE BESYLATE 2.5 MG/1
2.5 TABLET ORAL DAILY
Qty: 30 TABLET | Refills: 1 | Status: SHIPPED | OUTPATIENT
Start: 2018-01-01 | End: 2018-01-01

## 2018-01-01 RX ORDER — BLOOD SUGAR DIAGNOSTIC
STRIP MISCELLANEOUS
Qty: 100 EACH | Refills: 3 | Status: SHIPPED | OUTPATIENT
Start: 2018-01-01

## 2018-01-01 RX ORDER — ROSUVASTATIN CALCIUM 5 MG/1
2.5 TABLET, COATED ORAL DAILY
Qty: 30 TABLET | Refills: 1 | Status: SHIPPED | OUTPATIENT
Start: 2018-01-01 | End: 2018-01-01

## 2018-01-01 RX ORDER — ALBUTEROL SULFATE 90 UG/1
2 AEROSOL, METERED RESPIRATORY (INHALATION) EVERY 6 HOURS PRN
Qty: 1 INHALER | Refills: 1 | Status: ON HOLD | COMMUNITY
Start: 2018-01-01 | End: 2019-01-01

## 2018-01-01 RX ORDER — AMLODIPINE BESYLATE 5 MG/1
5 TABLET ORAL DAILY
Qty: 90 TABLET | Refills: 1 | Status: ON HOLD | OUTPATIENT
Start: 2018-01-01 | End: 2019-01-01

## 2018-01-01 RX ORDER — GLIPIZIDE 10 MG/1
10 TABLET ORAL DAILY
Qty: 1 TABLET | Refills: 1 | Status: ON HOLD | COMMUNITY
Start: 2018-01-01 | End: 2019-01-01

## 2018-01-01 RX ORDER — LOSARTAN POTASSIUM 100 MG/1
100 TABLET ORAL DAILY
Qty: 90 TABLET | Refills: 1 | Status: ON HOLD
Start: 2018-01-01 | End: 2019-01-01

## 2018-01-01 ASSESSMENT — ANXIETY QUESTIONNAIRES
GAD7 TOTAL SCORE: 3
2. NOT BEING ABLE TO STOP OR CONTROL WORRYING: NOT AT ALL
3. WORRYING TOO MUCH ABOUT DIFFERENT THINGS: SEVERAL DAYS
6. BECOMING EASILY ANNOYED OR IRRITABLE: SEVERAL DAYS
1. FEELING NERVOUS, ANXIOUS, OR ON EDGE: SEVERAL DAYS
GAD7 TOTAL SCORE: 3
5. BEING SO RESTLESS THAT IT IS HARD TO SIT STILL: NOT AT ALL
IF YOU CHECKED OFF ANY PROBLEMS ON THIS QUESTIONNAIRE, HOW DIFFICULT HAVE THESE PROBLEMS MADE IT FOR YOU TO DO YOUR WORK, TAKE CARE OF THINGS AT HOME, OR GET ALONG WITH OTHER PEOPLE: NOT DIFFICULT AT ALL
7. FEELING AFRAID AS IF SOMETHING AWFUL MIGHT HAPPEN: NOT AT ALL

## 2018-01-01 ASSESSMENT — PAIN SCALES - GENERAL
PAINLEVEL: SEVERE PAIN (6)
PAINLEVEL: MODERATE PAIN (5)
PAINLEVEL: MODERATE PAIN (5)

## 2018-01-01 ASSESSMENT — PATIENT HEALTH QUESTIONNAIRE - PHQ9
5. POOR APPETITE OR OVEREATING: NOT AT ALL
SUM OF ALL RESPONSES TO PHQ QUESTIONS 1-9: 5

## 2018-01-23 ENCOUNTER — TELEPHONE (OUTPATIENT)
Dept: FAMILY MEDICINE | Facility: OTHER | Age: 83
End: 2018-01-23

## 2018-01-23 NOTE — TELEPHONE ENCOUNTER
Reason for Call:  Form, our goal is to have forms completed with 72 hours, however, some forms may require a visit or additional information.    Type of letter, form or note:  medical    Who is the form from?: [employer]  (if other please explain)    Where did the form come from: form was faxed in    What clinic location was the form placed at?: [facility] - [phone]    Where the form was placed: 's Box    What number is listed as a contact on the form?: [phone]       Additional comments: none     Call taken on 1/23/2018 at 4:54 PM by Bozena Blue

## 2018-02-01 ENCOUNTER — MEDICAL CORRESPONDENCE (OUTPATIENT)
Dept: HEALTH INFORMATION MANAGEMENT | Facility: CLINIC | Age: 83
End: 2018-02-01

## 2018-02-01 ENCOUNTER — TELEPHONE (OUTPATIENT)
Dept: FAMILY MEDICINE | Facility: OTHER | Age: 83
End: 2018-02-01

## 2018-02-01 NOTE — TELEPHONE ENCOUNTER
Reason for Call:  Form, our goal is to have forms completed with 72 hours, however, some forms may require a visit or additional information.    Type of letter, form or note:  medical    Who is the form from?: [employer]  (if other please explain)    Where did the form come from: form was faxed in    What clinic location was the form placed at?: [facility] - [phone]    Where the form was placed: 's Box    What number is listed as a contact on the form?: [phone]       Additional comments: none    Call taken on 2/1/2018 at 11:35 AM by Bozena Blue

## 2018-02-08 ENCOUNTER — TELEPHONE (OUTPATIENT)
Dept: FAMILY MEDICINE | Facility: OTHER | Age: 83
End: 2018-02-08

## 2018-02-08 NOTE — TELEPHONE ENCOUNTER
Reason for Call:  Form, our goal is to have forms completed with 72 hours, however, some forms may require a visit or additional information.    Type of letter, form or note:  medical    Who is the form from?: [employer] (if other please explain)    Where did the form come from: form was faxed in    What clinic location was the form placed at?: [facility] - [phone]    Where the form was placed: 's Box    What number is listed as a contact on the form?:  [phone]       Additional comments: please complete and fax back Fax [phone]    Call taken on 2/8/2018 at 2:06 PM by Swapna Thao

## 2018-02-12 NOTE — TELEPHONE ENCOUNTER
Form has already been completed, signed, and faxed back to [employer] last week.  Copy of fax in DJ's fax folder.  Closing enc.  Lynn Muse, CMA

## 2018-03-12 DIAGNOSIS — E11.42 DIABETIC POLYNEUROPATHY ASSOCIATED WITH TYPE 2 DIABETES MELLITUS (H): ICD-10-CM

## 2018-03-13 RX ORDER — BLOOD SUGAR DIAGNOSTIC
STRIP MISCELLANEOUS
Qty: 100 EACH | Refills: 3 | Status: SHIPPED | OUTPATIENT
Start: 2018-03-13 | End: 2018-01-01

## 2018-03-13 NOTE — TELEPHONE ENCOUNTER
"Per Kamille, patient's wife, [facility] is going to [facility] on [facility] for follow up appointments. Patient switches off between [place] and [facility], \"You guys share his office visits and he's been doing this for years\". Declined to make appointment at this time.  "

## 2018-03-13 NOTE — TELEPHONE ENCOUNTER
"Requested Prescriptions   Pending Prescriptions Disp Refills     ACCU-CHEK BAL PLUS test strip [Pharmacy Med Name: ACCU-CHEK BAL PLUS  STRP] 100 each 3     Sig: USE TO TEST BLOOD SUGARS 1-3 TIMES DAILY AS DIRECTED.    Diabetic Supplies Protocol Passed    3/12/2018  9:48 AM       Passed - Patient is 18 years of age or older       Passed - Recent (6 mo) or future (30 days) visit within the authorizing provider's specialty    Patient had office visit in the last 6 months or has a visit in the next 30 days with authorizing provider.  See \"Patient Info\" tab in inbasket, or \"Choose Columns\" in Meds & Orders section of the refill encounter.            10/30/2017    Medication is being filled for 1 time refill only due to:  Patient needs to be seen because due for DM ov..   Leno Phoenix, RN, BSN    Please call and help schedule.      "

## 2018-04-09 ENCOUNTER — TRANSFERRED RECORDS (OUTPATIENT)
Dept: HEALTH INFORMATION MANAGEMENT | Facility: CLINIC | Age: 83
End: 2018-04-09

## 2018-04-09 LAB
ALT SERPL-CCNC: 21 U/L (ref 0–65)
CREAT SERPL-MCNC: 2.3 MG/DL (ref 0.5–1.5)
GFR SERPL CREATININE-BSD FRML MDRD: 27 ML/MIN/1.73M2
GLUCOSE SERPL-MCNC: 133 MG/DL (ref 70–100)
HBA1C MFR BLD: 6.7 % (ref 4.4–6.5)
POTASSIUM SERPL-SCNC: 5.2 MEQ/L (ref 3.5–5)

## 2018-04-13 DIAGNOSIS — D64.9 ANEMIA: Primary | ICD-10-CM

## 2018-04-23 DIAGNOSIS — D64.9 ANEMIA: Primary | ICD-10-CM

## 2018-05-07 ENCOUNTER — OFFICE VISIT (OUTPATIENT)
Dept: PODIATRY | Facility: CLINIC | Age: 83
End: 2018-05-07
Payer: COMMERCIAL

## 2018-05-07 VITALS
SYSTOLIC BLOOD PRESSURE: 140 MMHG | TEMPERATURE: 97.4 F | BODY MASS INDEX: 29.54 KG/M2 | HEIGHT: 71 IN | DIASTOLIC BLOOD PRESSURE: 72 MMHG | WEIGHT: 211 LBS

## 2018-05-07 DIAGNOSIS — L60.3 ONYCHODYSTROPHY: ICD-10-CM

## 2018-05-07 DIAGNOSIS — E11.40 TYPE 2 DIABETES MELLITUS WITH DIABETIC NEUROPATHY, WITHOUT LONG-TERM CURRENT USE OF INSULIN (H): Primary | ICD-10-CM

## 2018-05-07 DIAGNOSIS — I73.9 PERIPHERAL VASCULAR DISEASE (H): ICD-10-CM

## 2018-05-07 PROCEDURE — 11721 DEBRIDE NAIL 6 OR MORE: CPT | Mod: Q8 | Performed by: PODIATRIST

## 2018-05-07 PROCEDURE — 99203 OFFICE O/P NEW LOW 30 MIN: CPT | Mod: 25 | Performed by: PODIATRIST

## 2018-05-07 NOTE — LETTER
5/7/2018         RE: Aston Linn  [address] [facility] [address]        Dear Colleague,    Thank you for referring your patient, Aston Linn, to the [facility]. Please see a copy of my visit note below.    HPI:  Aston Linn is a 89 year old male who is seen in consultation at the request of Corky Yang MD    Pt presents for eval of:   (Onset, Location, L/R, Character, Treatments, Injury if yes)    1. DM Type 2  2. Ongoing, Left and Right great toenail, thick and discolored.  Unable to rate pain    Retired.    BMI is NORMAL.    Review of Systems:  Patient denies fever, chills, rash, wound, stiffness, limping, numbness, weakness, heart burn, blood in stool, chest pain with activity, calf pain when walking, shortness of breath with activity, chronic cough, easy bleeding/bruising, swelling of ankles, excessive thirst, fatigue, depression, anxiety.  Patient admits only to symptoms noted in history.     PAST MEDICAL HISTORY:   Past Medical History:   Diagnosis Date     Aortic aneurysm (H)      Blood transfusion      Carotid artery stenosis     80% lesion on left     CKD (chronic kidney disease) stage 3, GFR 30-59 ml/min      COPD (chronic obstructive pulmonary disease) (H)      Diabetic eye exam (H) 03/29/11     Diabetic eye exam (H) 02/12/2015    Dr Attila Roper     Emphysema of lung (H)      Hypertension      Peptic ulcer disease with hemorrhage      TIA (transient ischaemic attack)      Type II or unspecified type diabetes mellitus without mention of complication, not stated as uncontrolled      PAST SURGICAL HISTORY:   Past Surgical History:   Procedure Laterality Date     AMPUTATION      PARTIAL LEFT INDEX FINGERTIP     APPENDECTOMY       COLONOSCOPY  11/15/2010    COMBINED COLONOSCOPY, SINGLE BIOPSY/POLYPECTOMY BY BIOPSY performed by JUDSON TOWNSEND at  GI     COLONOSCOPY  11/15/2010    COMBINED COLONOSCOPY, REMOVE TUMOR/POLYP/LESION BY SNARE performed  by JUDSON TOWNSEND at  GI     ESOPHAGOSCOPY, GASTROSCOPY, DUODENOSCOPY (EGD), COMBINED  10/28/2010    COMBINED ESOPHAGOSCOPY, GASTROSCOPY, DUODENOSCOPY (EGD) performed by MORE ROBISON at  OR      REMV CATARACT EXTRACAP,INSERT LENS  07/20/06    right eye     HC REMV CATARACT EXTRACAP,INSERT LENS  8/17/2006    left eye     HEAD & NECK SURGERY      CERVICAL SPINE     ORTHOPEDIC SURGERY      CERVICAL FUSION     MEDICATIONS:   Current Outpatient Prescriptions:      ACCU-CHEK BAL PLUS test strip, USE TO TEST BLOOD SUGARS 1-3 TIMES DAILY AS DIRECTED., Disp: 100 each, Rfl: 3     ASPIRIN NOT PRESCRIBED, INTENTIONAL,, 1 each continuous prn for other Antiplatelet medication not prescribed intentionally due to Uncontrolled hypertension (systolic > 180, diastolic > 100) and Current thienopryridine therapy, Disp: 0 each, Rfl: 0     clorazepate (TRANXENE) 3.75 MG tablet, Take 1 tablet (3.75 mg) by mouth nightly as needed, Disp: 30 tablet, Rfl: 5     gabapentin (NEURONTIN) 300 MG capsule, Take 1 capsule (300 mg) by mouth 2 times daily as needed, Disp: 90 capsule, Rfl: 1     glipiZIDE (GLUCOTROL) 10 MG tablet, Take 1 tablet by mouth 2 times daily (before meals)., Disp: 180 tablet, Rfl: 1     hydrochlorothiazide (HYDRODIURIL) 25 MG tablet, Take 1 tablet (25 mg) by mouth daily, Disp: 90 tablet, Rfl: 1     LORazepam (ATIVAN) 0.5 MG tablet, Take 1 tablet (0.5 mg) by mouth nightly as needed for anxiety, Disp: 20 tablet, Rfl: 0     losartan (COZAAR) 100 MG tablet, Take 0.5 tablets (50 mg) by mouth 2 times daily, Disp: 30 tablet, Rfl: 1     Olodaterol HCl 2.5 MCG/ACT AERS, Inhale 1 puff into the lungs, Disp: , Rfl:      pantoprazole (PROTONIX) 40 MG EC tablet, TAKE ONE TABLET BY MOUTH ONCE DAILY, Disp: 90 tablet, Rfl: 2     PLAVIX 75 MG OR TABS, 1 TABLET DAILY, Disp: , Rfl:      STATIN NOT PRESCRIBED, INTENTIONAL,, Statin not prescribed intentionally due to Intolerance, Disp: 0 each, Rfl: 0  ALLERGIES:    Allergies  "  Allergen Reactions     No Known Allergies      SOCIAL HISTORY:   Social History     Social History     Marital status:      Spouse name: N/A     Number of children: N/A     Years of education: N/A     Occupational History     Not on file.     Social History Main Topics     Smoking status: Current Some Day Smoker     Packs/day: 0.25     Years: 60.00     Types: Cigarettes     Smokeless tobacco: Never Used     Alcohol use No     Drug use: No     Sexual activity: No     Other Topics Concern     Parent/Sibling W/ Cabg, Mi Or Angioplasty Before 65f 55m? No     Social History Narrative     FAMILY HISTORY:   Family History   Problem Relation Age of Onset     DIABETES Mother      CANCER Sister      brain     EXAM:Vitals: /72 (BP Location: Left arm, Cuff Size: Adult Regular)  Temp 97.4  F (36.3  C) (Temporal)  Ht 5' 11\" (1.803 m)  Wt 211 lb (95.7 kg)  BMI 29.43 kg/m2  BMI= Body mass index is 29.43 kg/(m^2).    General appearance: Patient is alert and fully cooperative with history & exam.  No sign of distress is noted during the visit.     Psychiatric: Affect is pleasant & appropriate.  Patient appears motivated to improve health.     Respiratory: Breathing is regular & unlabored while sitting.     HEENT: Hearing is intact to spoken word.  Speech is clear.  No gross evidence of visual impairment that would impact ambulation.     Vascular: DP 0/4 & PT 0/4 left & right.  CFT delayed with dependent rubor noted about the digits.  Diminished hair growth distal to mid tibia and no hair about the foot and toes.  Temperature changes noted, warm to cool proximal to distal.  Hemosiderin pigmentation noted with multiple varicosities legs and feet bilateral. Generalized edema bilateral legs and feet.  Pt denies claudication history.     Neurologic: Normal plantar response bilateral.  Loss of protective threshold plus 2/10 applications of a 5.07 monofilament.  Pt admits burning and paraesthesias about the feet and toes " with palpation.     Dermatologic: All 10 nails are thickened, elongated, discolored and painful with palpation and debridement.  Diminished texture turgor and tone about the integument.  Skin is thin & shiny.  No Pre ulcerative hyperkeratosis noted.  No abscess or full thickness ulcerations noted.     Musculoskeletal: Patient is ambulatory without assistive device or brace.  There is semi reducible contracture of the lesser digits.    Labs:  2014 ABIs of 0.83-0.87, biphasic digital waveforms.      Hemoglobin A1C (%)   Date Value   10/30/2017 6.3 (H)   04/20/2017 6.7 (H)   10/14/2016 6.6 (H)   04/20/2016 6.8   12/02/2015 6.8 (H)   09/10/2015 6.9 (H)   12/03/2014 7.4 (H)   05/02/2014 7.9 (H)     Creatinine (mg/dL)   Date Value   10/30/2017 2.51 (H)   04/20/2017 2.2 (H)   10/14/2016 2.47 (H)   04/20/2016 2.3   12/02/2015 2.61 (H)   11/14/2015 2.22 (H)       ASSESSMENT:       ICD-10-CM    1. Type 2 diabetes mellitus with diabetic neuropathy, without long-term current use of insulin (H) E11.40 DEBRIDEMENT OF NAILS, 6 OR MORE   2. Peripheral vascular disease (H) I73.9 DEBRIDEMENT OF NAILS, 6 OR MORE   3. 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   We discussed risk factors and preventive measures.    We discussed appropriate hygiene, shoe gear, daily foot exam, and reinforced management of weight, diet, activity goals and HA1C goal for diabetic patients.    Dispensed written foot care instructions.    All questions were answered to their satisfaction.    RTC once yearly or as needed with questions or concerns.      He obtains diabetic shoes elsewhere and does not wear them.  We reviewed risks associated with shoe gear and recommended follow-up once yearly for diabetic foot exam and evaluation of shoe gear.  Recommend he utilize his diabetic shoes.  We discussed loss of protective threshold and discussed how to obtain interdigital bathing  more easily.  All questions were answered in regards to this.    Recommended noninvasive vascular exams for better baseline evaluation of arterial inflow however the patient refuses today.  In 2014 patient had biphasic waveforms ABIs of 0.83-0.87.  He understands there are risks associated with this and that it should be evaluated and monitored.  We will continue to monitor this upon future visits.    Ernie Lopez DPM      Again, thank you for allowing me to participate in the care of your patient.        Sincerely,        Ernie Lopez DPM

## 2018-05-07 NOTE — PROGRESS NOTES
HPI:  Aston Linn is a 89 year old male who is seen in consultation at the request of Corky Yang MD    Pt presents for eval of:   (Onset, Location, L/R, Character, Treatments, Injury if yes)    1. DM Type 2  2. Ongoing, Left and Right great toenail, thick and discolored.  Unable to rate pain    Retired.    BMI is NORMAL.    Review of Systems:  Patient denies fever, chills, rash, wound, stiffness, limping, numbness, weakness, heart burn, blood in stool, chest pain with activity, calf pain when walking, shortness of breath with activity, chronic cough, easy bleeding/bruising, swelling of ankles, excessive thirst, fatigue, depression, anxiety.  Patient admits only to symptoms noted in history.     PAST MEDICAL HISTORY:   Past Medical History:   Diagnosis Date     Aortic aneurysm (H)      Blood transfusion      Carotid artery stenosis     80% lesion on left     CKD (chronic kidney disease) stage 3, GFR 30-59 ml/min      COPD (chronic obstructive pulmonary disease) (H)      Diabetic eye exam (H) 03/29/11     Diabetic eye exam (H) 02/12/2015    Dr Attila Roper     Emphysema of lung (H)      Hypertension      Peptic ulcer disease with hemorrhage      TIA (transient ischaemic attack)      Type II or unspecified type diabetes mellitus without mention of complication, not stated as uncontrolled      PAST SURGICAL HISTORY:   Past Surgical History:   Procedure Laterality Date     AMPUTATION      PARTIAL LEFT INDEX FINGERTIP     APPENDECTOMY       COLONOSCOPY  11/15/2010    COMBINED COLONOSCOPY, SINGLE BIOPSY/POLYPECTOMY BY BIOPSY performed by JUDSON TOWNSEND at  GI     COLONOSCOPY  11/15/2010    COMBINED COLONOSCOPY, REMOVE TUMOR/POLYP/LESION BY SNARE performed by JUDSON TOWNSEND at  GI     ESOPHAGOSCOPY, GASTROSCOPY, DUODENOSCOPY (EGD), COMBINED  10/28/2010    COMBINED ESOPHAGOSCOPY, GASTROSCOPY, DUODENOSCOPY (EGD) performed by MORE ROBISON at  OR      REMV CATARACT EXTRACAP,INSERT LENS   07/20/06    right eye     HC REMV CATARACT EXTRACAP,INSERT LENS  8/17/2006    left eye     HEAD & NECK SURGERY      CERVICAL SPINE     ORTHOPEDIC SURGERY      CERVICAL FUSION     MEDICATIONS:   Current Outpatient Prescriptions:      ACCU-CHEK BAL PLUS test strip, USE TO TEST BLOOD SUGARS 1-3 TIMES DAILY AS DIRECTED., Disp: 100 each, Rfl: 3     ASPIRIN NOT PRESCRIBED, INTENTIONAL,, 1 each continuous prn for other Antiplatelet medication not prescribed intentionally due to Uncontrolled hypertension (systolic > 180, diastolic > 100) and Current thienopryridine therapy, Disp: 0 each, Rfl: 0     clorazepate (TRANXENE) 3.75 MG tablet, Take 1 tablet (3.75 mg) by mouth nightly as needed, Disp: 30 tablet, Rfl: 5     gabapentin (NEURONTIN) 300 MG capsule, Take 1 capsule (300 mg) by mouth 2 times daily as needed, Disp: 90 capsule, Rfl: 1     glipiZIDE (GLUCOTROL) 10 MG tablet, Take 1 tablet by mouth 2 times daily (before meals)., Disp: 180 tablet, Rfl: 1     hydrochlorothiazide (HYDRODIURIL) 25 MG tablet, Take 1 tablet (25 mg) by mouth daily, Disp: 90 tablet, Rfl: 1     LORazepam (ATIVAN) 0.5 MG tablet, Take 1 tablet (0.5 mg) by mouth nightly as needed for anxiety, Disp: 20 tablet, Rfl: 0     losartan (COZAAR) 100 MG tablet, Take 0.5 tablets (50 mg) by mouth 2 times daily, Disp: 30 tablet, Rfl: 1     Olodaterol HCl 2.5 MCG/ACT AERS, Inhale 1 puff into the lungs, Disp: , Rfl:      pantoprazole (PROTONIX) 40 MG EC tablet, TAKE ONE TABLET BY MOUTH ONCE DAILY, Disp: 90 tablet, Rfl: 2     PLAVIX 75 MG OR TABS, 1 TABLET DAILY, Disp: , Rfl:      STATIN NOT PRESCRIBED, INTENTIONAL,, Statin not prescribed intentionally due to Intolerance, Disp: 0 each, Rfl: 0  ALLERGIES:    Allergies   Allergen Reactions     No Known Allergies      SOCIAL HISTORY:   Social History     Social History     Marital status:      Spouse name: N/A     Number of children: N/A     Years of education: N/A     Occupational History     Not on file.  "    Social History Main Topics     Smoking status: Current Some Day Smoker     Packs/day: 0.25     Years: 60.00     Types: Cigarettes     Smokeless tobacco: Never Used     Alcohol use No     Drug use: No     Sexual activity: No     Other Topics Concern     Parent/Sibling W/ Cabg, Mi Or Angioplasty Before 65f 55m? No     Social History Narrative     FAMILY HISTORY:   Family History   Problem Relation Age of Onset     DIABETES Mother      CANCER Sister      brain     EXAM:Vitals: /72 (BP Location: Left arm, Cuff Size: Adult Regular)  Temp 97.4  F (36.3  C) (Temporal)  Ht 5' 11\" (1.803 m)  Wt 211 lb (95.7 kg)  BMI 29.43 kg/m2  BMI= Body mass index is 29.43 kg/(m^2).    General appearance: Patient is alert and fully cooperative with history & exam.  No sign of distress is noted during the visit.     Psychiatric: Affect is pleasant & appropriate.  Patient appears motivated to improve health.     Respiratory: Breathing is regular & unlabored while sitting.     HEENT: Hearing is intact to spoken word.  Speech is clear.  No gross evidence of visual impairment that would impact ambulation.     Vascular: DP 0/4 & PT 0/4 left & right.  CFT delayed with dependent rubor noted about the digits.  Diminished hair growth distal to mid tibia and no hair about the foot and toes.  Temperature changes noted, warm to cool proximal to distal.  Hemosiderin pigmentation noted with multiple varicosities legs and feet bilateral. Generalized edema bilateral legs and feet.  Pt denies claudication history.     Neurologic: Normal plantar response bilateral.  Loss of protective threshold plus 2/10 applications of a 5.07 monofilament.  Pt admits burning and paraesthesias about the feet and toes with palpation.     Dermatologic: All 10 nails are thickened, elongated, discolored and painful with palpation and debridement.  Diminished texture turgor and tone about the integument.  Skin is thin & shiny.  No Pre ulcerative hyperkeratosis " noted.  No abscess or full thickness ulcerations noted.     Musculoskeletal: Patient is ambulatory without assistive device or brace.  There is semi reducible contracture of the lesser digits.    Labs:  2014 ABIs of 0.83-0.87, biphasic digital waveforms.      Hemoglobin A1C (%)   Date Value   10/30/2017 6.3 (H)   04/20/2017 6.7 (H)   10/14/2016 6.6 (H)   04/20/2016 6.8   12/02/2015 6.8 (H)   09/10/2015 6.9 (H)   12/03/2014 7.4 (H)   05/02/2014 7.9 (H)     Creatinine (mg/dL)   Date Value   10/30/2017 2.51 (H)   04/20/2017 2.2 (H)   10/14/2016 2.47 (H)   04/20/2016 2.3   12/02/2015 2.61 (H)   11/14/2015 2.22 (H)       ASSESSMENT:       ICD-10-CM    1. Type 2 diabetes mellitus with diabetic neuropathy, without long-term current use of insulin (H) E11.40 DEBRIDEMENT OF NAILS, 6 OR MORE   2. Peripheral vascular disease (H) I73.9 DEBRIDEMENT OF NAILS, 6 OR MORE   3. 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   We discussed risk factors and preventive measures.    We discussed appropriate hygiene, shoe gear, daily foot exam, and reinforced management of weight, diet, activity goals and HA1C goal for diabetic patients.    Dispensed written foot care instructions.    All questions were answered to their satisfaction.    RTC once yearly or as needed with questions or concerns.      He obtains diabetic shoes elsewhere and does not wear them.  We reviewed risks associated with shoe gear and recommended follow-up once yearly for diabetic foot exam and evaluation of shoe gear.  Recommend he utilize his diabetic shoes.  We discussed loss of protective threshold and discussed how to obtain interdigital bathing more easily.  All questions were answered in regards to this.    Recommended noninvasive vascular exams for better baseline evaluation of arterial inflow however the patient refuses today.  In 2014 patient had biphasic waveforms ABIs of  0.83-0.87.  He understands there are risks associated with this and that it should be evaluated and monitored.  We will continue to monitor this upon future visits.    Ernie Lopez DPM

## 2018-05-07 NOTE — PATIENT INSTRUCTIONS
"DIABETES AND YOUR FEET    What effect does diabetes have on the feet?  Diabetes can result in several problems in the feet including contractures of the tendons leading to deformities and reduced function of the bones, skin ulcers or open sores on pressure points or prominent deformities, reduced sensation, reduced blood flow and thus reduced oxygen and immune cells to the tiny vessels in our feet. This all leads to higher risk of hospitalization, infections, and amputations.     What is neuropathy?  Neuropathy is a term used to describe a loss of nerve function.  Patients with diabetes are at risk of developing neuropathy if their sugars continue to run high and are above the normal value of 140.  The elevated blood sugar in the body enters the nerves causing it to swell and impair nerve function.  The higher the blood sugar and the longer it is elevated, the more damage is done to nerves.  This damage is permanent and irreversible.  These damaged sensory nerves can then cause reduced feeling or cause pain.  Damaged motor nerves can reduce blood flow and white blood cells into into your foot, skin and bones reducing your ability to heal a small problem. And neuropathy can cause tendons to become unbalanced and contribute to the formation of deformity and contractures in our feet. Often times, neuropathy can be prevented by controlling your blood sugar.  Your risk of developing neuropathy goes up dramatically as your hemoglobin A1C raises above 7.5.      How do I know if I have neuropathy?  When a person develops neuropathy, they usually begin to feel numbness or tingling in their feet and sometimes in their legs.  Other symptoms may include painful burning or hot feet, tingling, electrical sensations or feeling like insects or ants are crawling on your feet or legs.  If blood sugar remains above 140  for long periods of time, neuropathy can also occur in the hands.  When a person loses their \"protective threshold\" " or ability to detect a 5.07 [place] Kenneth monofilament is when they have elevated risk for developing foot deformity, contractures, foot infections, amputations, Charcot arthropathy, or other complications. Keep your hemoglobin A1C below 7.5 to reduce this risk.    What is vascular disease?  Peripheral vascular disease is a term used to describe a loss or decrease in circulation (blood flow).  There is a problem in getting blood, immune cells, and oxygen to areas that need it.  Similar to neuropathy, sugars can build up in the walls of the arteries (blood vessels) and cause them to become swollen, thickened and hardened.  This decreases the amount of blood that can go to an area that needs it.  Though this is common in the legs of diabetic patients, it can also affect other arteries (blood vessels) in the body such as in the heart, kidney, eyes, and the blood flow into bones.  It is often seen first in the small vessels of her body notably our feet and toes.    How do I know if I have vascular disease?  In the legs, vascular disease usually results in cramping.  Patients who develop leg cramps after walking the same distance every time (i.e. One block, half a mile, ect.) need to let their doctors know so that their circulation may be checked.  Cramps causing severe pain in the feet and/or legs while sleeping and the cramps go away when you stand or hang your legs off the side of the bed, may also be a sign of poor blood circulation.  Occasional cramping in cold weather or on rare occasions with activity may not be due to poor circulation, but you should inform your doctor.    How can these problems be prevented?  The key to prevention is good blood sugar control all day every day.  Inadequate blood sugar control is the most common way patients experience these problems. Reducing, controlling and measuring your daily consumption of sugar or carbohydrates is essential to understanding and managing diabetes.   Physical activity (exercise) is a very good way to help decrease your blood sugars.  Exercise can lower your blood sugar, blood pressure, and cholesterol.  It also reduces your risk for heart disease and stroke, relieves stress, and strengthens your heart, muscles and bones. Physical activity also increases your balance and reduces development of contractures and foot deformities over time. In addition, regular activity helps insulin work better, improves your blood circulation, and keeps your joints flexible.  If you're trying to lose weight, a combination of exercise and wise food choices can help you reach your target weight and maintain it.  Activity and exercise alone can not make up for poor diet choices, eating too much, or eating too many sugars or carbohydrates.  Ask your doctor for help when you are not meeting your blood sugar goals. Changes or increases in medication are powerful tools in reducing your blood sugar.    Know your blood sugar and hemoglobin A1C trend.  Upon first diagnosis or during acute illness, checking your blood sugar 4 times a day can help you understand how your diet, activity, and lifestyle affect your blood sugar.  Monitoring your hemoglobin A1C can help you understand how well you are managing blood sugar over the long run.  Your hemoglobin A1C tells you what your blood sugar averages all day, every day, over the past 90 days.       To experience the lowest risk of complications associated with diabetes such as neuropathy, loss of blood flow, bone or joint infection, charcot arthropathy, or amputation, the American Diabetes Association recommends a target hemoglobin A1C of less than 7.0%, while the American Association of Clinical Endocrinologists' recommendation is 6.5% or less.  Both organizations advise that the goals be individualized based on patient factors such as other health conditions, history of hypoglycemia, education, and life expectancy.  A patients risk of  experiencing complications associated with diabetes is only slightly elevated with a hemoglobin A1C above 6.0.  However, this risk goes up exponentially when the hemoglobin A1C is above 7.5.  The longer the hemoglobin A1C is elevated, the more risk that patient will experience in their lifetime. The damage that occurs to nerves, blood vessels, tendons, bones and body organs, while their hemoglobin A1C is elevated is mostly irreversible and worsens with each additional time period of elevated hemoglobin A1C.     You must understand and manage your disease.  Your health insurance or medical team cannot manage this disease for you.  When you take responsibility for understanding and managing your disease, you can expect to experience fewer problems associated with diabetes in your lifetime.  You will  Also experience a higher quality of life and health and reduced cost of health care.    Diabetic Foot Care Recommendations  The following are recommendations for avoiding serious foot problems or injury    DO'S  1. Be aware of your hemoglobin A1C and continue to follow up with your medical team for adjustments in your lifestyle and medication until your reach your A1C goal.  Keep this below 7.5 to reduce your risk of developing complications associated with diabetes.    2.  Wash your feet with lukewarm water and a mild soap and then dry them thoroughly, especially between the toes.  Gently floss your towel or washcloth between each toe at every bath.  Soaking your feet in water cannot clean dead skin, debris, and bacteria from your feet and is not necessary.   3. Examine your feet daily looking for cuts, corns, blisters, cracks, ect..., especially after wearing new shoes or increased or changed activities.  Make sure to look between your toes.  If you cannot see the bottom of your feet, set a mirror on the floor and hold your foot over it, or ask a family member to examine your feet for you daily.  Contact your doctor  immediately if new problems are noted or if sores are not healing.  4.  Immediately apply moisturizer cream such as Cetaphil to the tops and bottoms of your feet, avoiding areas between the toes.  Apply sunscreen or cover your feet if they will be exposed to extended sunlight.  5.Use clean comfortable shoes.  Socks should not have thick seams or cut off the circulation around the leg.  Break in new shoes slowly and rotate with older shoes until broken in.  Check the inside of your shoes with your hand to look for areas of irritation or objects that may have fallen into your shoes.    6. Keep slippers by the side of your bed for use during the night.  7. Shoes should be fitted by a professional and should not cause areas of irritation.  Check your feet regularly when wearing a new pair of shoes and replace them as needed.  8.  Talk to your doctor about proper exercise.  Exercise and stretching stimulate blood flow to your feet and maintain proper glucose levels.  Use it or lose it!  9.  Monitor your blood glucose level and your hemoglobin A1C.  Notify your doctor immediately if your blood sugar is abnormally high or low.  10.  Cut your nails straight across, but then gently round any sharp edges with a nail file.  If you have neuropathy, peripheral vascular disease or cannot see that well to trim your own toenails, see a medical professional for care.    DONT'S  1.  Do not soak your feet if you have an open sore or your provider has informed you that you have neuropathy or loss of protective threshold.  Use only lukewarm water and always check the temperature with your hand as hot water can easily burn your feet.    2.  Never use a hot water bottle or heating pad on your feet.  Also do not apply hot or cold compresses to your feet.  With decreased sensation, you could burn or freeze your feet.  Do not rest your feet near a heat source such as a heater or heat register.    3.  Do not apply any of these to your  "feet:    - over the counter medicine for corns or warts    -  Harsh chemicals like boric acid    -  Do not self-treat corns, cuts, blisters or infections.  Always consult your doctor.   4.  Do not wear sandals, slippers or walk barefoot, especially on harsh surfaces.  5.  If you smoke, stop!!!    Nail Debridement    A high quality instrument makes trimming toenails MUCH easier.  Search [employer]bay for any 5\" nail nipper manufactured by reliable brands such as Miltex, Integra or Jarit as these quality instruments will help manage difficult nails more effectively and comfortably. We use Miltex -SS.  A physician is not necessary to trim nails even if you are taking blood thinners or are diabetic.  Your family or care givers may help manage your toenails.      Trim or sand the nails once weekly.  Do not wait until they are long and painful or trimming will become too difficult and painful and will increase your risk of complications or infection.  A course file or 120 grit sandpaper on a sanding block can be helpful.  For very thick nails many people prefer battery operated kyle such as an Amope', Personal Pedi and Emjoi for regular use or heavy painful callouses or thick toenails.    Trim or skive any portion of nail that is thick, loose, crumbling, or not well attached. Do not tear the nail away, but rather cut them with a nail nipperor sand or sand them down.  You may follow up with your Podiatric Physician if you have pain, bleeding, infection, questions or other concerns.      You may also contact the following Registered Nurses for further help with nail debridement and minor hygiene concnerns.  They may come to your home or meet them at their clinic to trim your toenails and soak your feet, as well as monitor for any complications that would require evaluation by a Physician.        [facility]  Xiao Valera, RN  Office [phone]    [facility]  Yuly Silver, " RN  [phone]   Call or text for appointment  Some home visits and has a clinic at:  [address] [address]    [facility]  [phone]  [facility]  [place]    [facility]  [phone]  [email or web address]  [facility]    For up to date list and to find foot care nurses in other communities visit [employer] Association website:  afcna.org.         Calluses, Corns, IPKs, Porokeratosis    When there is excessive friction or pressure on the skin, the body responds by making the skin thicker.  While this may protect the deeper structures, the thickened skin can take up more space and thus increase pressure over a bony prominence or become an open sore or skin ulcer as this skin becomes less flexible.    Flat, diffuse thickening are simple calluses and they are usually caused by friction.  Often these are the result of rubbing on a shoe or going barefoot.    Calluses with a central core between the toes are called corns.  These often result from prominent joints on adjacent toes rubbing together.  Theses are often a symptom of bone malalignment and will usually recur unless the underlying bones are addressed.    Many of these lesions can be kept comfortable with routine maintenance. This consists of filing them with a Ped Egg, callus file, or 120 grit sandpaper on a block, every day during your bath or shower.  Most people prefer battery operated kyle such as an Amope', Personal Pedi and Emjoi for regular use or heavy painful callouses.  Heavy creams or ointments can be applied 1-2 times every day to keep them soft. Toe spacers can be used for corns, gel pads can be used for other lesions on the bottom of the foot. If there is a deformity noted, such as a prominent bone, often this can be addressed to minimize recurrence. However, sometimes the pressure and lesion simply migrates to another spot after surgery, so it is not a guaranteed cure.      If you have severe callouses and cracking, you may apply heavy greasy ointments that you scoop up such as Cetaphil, Eucerin, Aquaphor or Vaseline.  For more aggressive help apply heavy creams or ointment under occlusive dressings such as Saran Wrap or Jelly Feet while sleeping.   Jelly Feet can be obtained at [email or web address].     To be successful with managing hyperkeratotic skin, you must manage hygiene daily.  At your bath or shower time is the easiest time to work on this when skin is most soft.  There is no medical or surgical treatment that will absolutely eliminate many of these symptoms.      Pedifix is a reliable source for all sorts of foot pads, cushions, or interdigital spacers and foot appliances. Go to [email or web address] or request a catalog at 5-226-snapp.me.        Please call with any additional questions.

## 2018-05-07 NOTE — MR AVS SNAPSHOT
After Visit Summary   5/7/2018    Aston Linn    MRN: [id number]           Patient Information     Date Of Birth          9/21/1926        Visit Information        Provider Department      5/7/2018 8:00 AM Ernie Lopez DPM [facility] Instructions    DIABETES AND YOUR FEET    What effect does diabetes have on the feet?  Diabetes can result in several problems in the feet including contractures of the tendons leading to deformities and reduced function of the bones, skin ulcers or open sores on pressure points or prominent deformities, reduced sensation, reduced blood flow and thus reduced oxygen and immune cells to the tiny vessels in our feet. This all leads to higher risk of hospitalization, infections, and amputations.     What is neuropathy?  Neuropathy is a term used to describe a loss of nerve function.  Patients with diabetes are at risk of developing neuropathy if their sugars continue to run high and are above the normal value of 140.  The elevated blood sugar in the body enters the nerves causing it to swell and impair nerve function.  The higher the blood sugar and the longer it is elevated, the more damage is done to nerves.  This damage is permanent and irreversible.  These damaged sensory nerves can then cause reduced feeling or cause pain.  Damaged motor nerves can reduce blood flow and white blood cells into into your foot, skin and bones reducing your ability to heal a small problem. And neuropathy can cause tendons to become unbalanced and contribute to the formation of deformity and contractures in our feet. Often times, neuropathy can be prevented by controlling your blood sugar.  Your risk of developing neuropathy goes up dramatically as your hemoglobin A1C raises above 7.5.      How do I know if I have neuropathy?  When a person develops neuropathy, they usually begin to feel numbness or tingling in their feet and sometimes in their legs.   "Other symptoms may include painful burning or hot feet, tingling, electrical sensations or feeling like insects or ants are crawling on your feet or legs.  If blood sugar remains above 140  for long periods of time, neuropathy can also occur in the hands.  When a person loses their \"protective threshold\" or ability to detect a 5.07 [place] monofilament is when they have elevated risk for developing foot deformity, contractures, foot infections, amputations, Charcot arthropathy, or other complications. Keep your hemoglobin A1C below 7.5 to reduce this risk.    What is vascular disease?  Peripheral vascular disease is a term used to describe a loss or decrease in circulation (blood flow).  There is a problem in getting blood, immune cells, and oxygen to areas that need it.  Similar to neuropathy, sugars can build up in the walls of the arteries (blood vessels) and cause them to become swollen, thickened and hardened.  This decreases the amount of blood that can go to an area that needs it.  Though this is common in the legs of diabetic patients, it can also affect other arteries (blood vessels) in the body such as in the heart, kidney, eyes, and the blood flow into bones.  It is often seen first in the small vessels of her body notably our feet and toes.    How do I know if I have vascular disease?  In the legs, vascular disease usually results in cramping.  Patients who develop leg cramps after walking the same distance every time (i.e. One block, half a mile, ect.) need to let their doctors know so that their circulation may be checked.  Cramps causing severe pain in the feet and/or legs while sleeping and the cramps go away when you stand or hang your legs off the side of the bed, may also be a sign of poor blood circulation.  Occasional cramping in cold weather or on rare occasions with activity may not be due to poor circulation, but you should inform your doctor.    How can these problems be prevented?  " The key to prevention is good blood sugar control all day every day.  Inadequate blood sugar control is the most common way patients experience these problems. Reducing, controlling and measuring your daily consumption of sugar or carbohydrates is essential to understanding and managing diabetes.  Physical activity (exercise) is a very good way to help decrease your blood sugars.  Exercise can lower your blood sugar, blood pressure, and cholesterol.  It also reduces your risk for heart disease and stroke, relieves stress, and strengthens your heart, muscles and bones. Physical activity also increases your balance and reduces development of contractures and foot deformities over time. In addition, regular activity helps insulin work better, improves your blood circulation, and keeps your joints flexible.  If you're trying to lose weight, a combination of exercise and wise food choices can help you reach your target weight and maintain it.  Activity and exercise alone can not make up for poor diet choices, eating too much, or eating too many sugars or carbohydrates.  Ask your doctor for help when you are not meeting your blood sugar goals. Changes or increases in medication are powerful tools in reducing your blood sugar.    Know your blood sugar and hemoglobin A1C trend.  Upon first diagnosis or during acute illness, checking your blood sugar 4 times a day can help you understand how your diet, activity, and lifestyle affect your blood sugar.  Monitoring your hemoglobin A1C can help you understand how well you are managing blood sugar over the long run.  Your hemoglobin A1C tells you what your blood sugar averages all day, every day, over the past 90 days.       To experience the lowest risk of complications associated with diabetes such as neuropathy, loss of blood flow, bone or joint infection, charcot arthropathy, or amputation, the American Diabetes Association recommends a target hemoglobin A1C of less than  7.0%, while the American Association of Clinical Endocrinologists' recommendation is 6.5% or less.  Both organizations advise that the goals be individualized based on patient factors such as other health conditions, history of hypoglycemia, education, and life expectancy.  A patients risk of experiencing complications associated with diabetes is only slightly elevated with a hemoglobin A1C above 6.0.  However, this risk goes up exponentially when the hemoglobin A1C is above 7.5.  The longer the hemoglobin A1C is elevated, the more risk that patient will experience in their lifetime. The damage that occurs to nerves, blood vessels, tendons, bones and body organs, while their hemoglobin A1C is elevated is mostly irreversible and worsens with each additional time period of elevated hemoglobin A1C.     You must understand and manage your disease.  Your health insurance or medical team cannot manage this disease for you.  When you take responsibility for understanding and managing your disease, you can expect to experience fewer problems associated with diabetes in your lifetime.  You will  Also experience a higher quality of life and health and reduced cost of health care.    Diabetic Foot Care Recommendations  The following are recommendations for avoiding serious foot problems or injury    DO'S  1. Be aware of your hemoglobin A1C and continue to follow up with your medical team for adjustments in your lifestyle and medication until your reach your A1C goal.  Keep this below 7.5 to reduce your risk of developing complications associated with diabetes.    2.  Wash your feet with lukewarm water and a mild soap and then dry them thoroughly, especially between the toes.  Gently floss your towel or washcloth between each toe at every bath.  Soaking your feet in water cannot clean dead skin, debris, and bacteria from your feet and is not necessary.   3. Examine your feet daily looking for cuts, corns, blisters, cracks,  ect..., especially after wearing new shoes or increased or changed activities.  Make sure to look between your toes.  If you cannot see the bottom of your feet, set a mirror on the floor and hold your foot over it, or ask a family member to examine your feet for you daily.  Contact your doctor immediately if new problems are noted or if sores are not healing.  4.  Immediately apply moisturizer cream such as Cetaphil to the tops and bottoms of your feet, avoiding areas between the toes.  Apply sunscreen or cover your feet if they will be exposed to extended sunlight.  5.Use clean comfortable shoes.  Socks should not have thick seams or cut off the circulation around the leg.  Break in new shoes slowly and rotate with older shoes until broken in.  Check the inside of your shoes with your hand to look for areas of irritation or objects that may have fallen into your shoes.    6. Keep slippers by the side of your bed for use during the night.  7. Shoes should be fitted by a professional and should not cause areas of irritation.  Check your feet regularly when wearing a new pair of shoes and replace them as needed.  8.  Talk to your doctor about proper exercise.  Exercise and stretching stimulate blood flow to your feet and maintain proper glucose levels.  Use it or lose it!  9.  Monitor your blood glucose level and your hemoglobin A1C.  Notify your doctor immediately if your blood sugar is abnormally high or low.  10.  Cut your nails straight across, but then gently round any sharp edges with a nail file.  If you have neuropathy, peripheral vascular disease or cannot see that well to trim your own toenails, see a medical professional for care.    DONT'S  1.  Do not soak your feet if you have an open sore or your provider has informed you that you have neuropathy or loss of protective threshold.  Use only lukewarm water and always check the temperature with your hand as hot water can easily burn your feet.    2.  Never  "use a hot water bottle or heating pad on your feet.  Also do not apply hot or cold compresses to your feet.  With decreased sensation, you could burn or freeze your feet.  Do not rest your feet near a heat source such as a heater or heat register.    3.  Do not apply any of these to your feet:    - over the counter medicine for corns or warts    -  Harsh chemicals like boric acid    -  Do not self-treat corns, cuts, blisters or infections.  Always consult your doctor.   4.  Do not wear sandals, slippers or walk barefoot, especially on harsh surfaces.  5.  If you smoke, stop!!!    Nail Debridement    A high quality instrument makes trimming toenails MUCH easier.  Search [employer] for any 5\" nail nipper manufactured by reliable brands such as Miltex, Integra or Jarit as these quality instruments will help manage difficult nails more effectively and comfortably. We use Miltex -SS.  A physician is not necessary to trim nails even if you are taking blood thinners or are diabetic.  Your family or care givers may help manage your toenails.      Trim or sand the nails once weekly.  Do not wait until they are long and painful or trimming will become too difficult and painful and will increase your risk of complications or infection.  A course file or 120 grit sandpaper on a sanding block can be helpful.  For very thick nails many people prefer battery operated kyle such as an Amope', Personal Pedi and Emjoi for regular use or heavy painful callouses or thick toenails.    Trim or skive any portion of nail that is thick, loose, crumbling, or not well attached. Do not tear the nail away, but rather cut them with a nail nipperor sand or sand them down.  You may follow up with your Podiatric Physician if you have pain, bleeding, infection, questions or other concerns.      You may also contact the following Registered Nurses for further help with nail debridement and minor hygiene concnerns.  They may come to your home or " meet them at their clinic to trim your toenails and soak your feet, as well as monitor for any complications that would require evaluation by a Physician.        [facility]  Xiao Valera RN  Office [phone]    [facility]  Yuly Silver RN  [phone]   Call or text for appointment  Some home visits and has a clinic at:  [address] [place] [id number]    [facility]  [phone]  [facility]    [facility]  [phone]  [email or web address]  [facility]    For up to date list and to find foot care nurses in other communities visit American Foot Care Nurses Association website:  afcna.org.         Calluses, Corns, IPKs, Porokeratosis    When there is excessive friction or pressure on the skin, the body responds by making the skin thicker.  While this may protect the deeper structures, the thickened skin can take up more space and thus increase pressure over a bony prominence or become an open sore or skin ulcer as this skin becomes less flexible.    Flat, diffuse thickening are simple calluses and they are usually caused by friction.  Often these are the result of rubbing on a shoe or going barefoot.    Calluses with a central core between the toes are called corns.  These often result from prominent joints on adjacent toes rubbing together.  Theses are often a symptom of bone malalignment and will usually recur unless the underlying bones are addressed.    Many of these lesions can be kept comfortable with routine maintenance. This consists of filing them with a Ped Egg, callus file, or 120 grit sandpaper on a block, every day during your bath or shower.  Most people prefer battery operated kyle such as an Amope', Personal Pedi and Emjoi for regular use or heavy painful callouses.  Heavy creams or ointments can be applied 1-2 times every day to keep them soft. Toe spacers can be used for corns, gel pads can be  used for other lesions on the bottom of the foot. If there is a deformity noted, such as a prominent bone, often this can be addressed to minimize recurrence. However, sometimes the pressure and lesion simply migrates to another spot after surgery, so it is not a guaranteed cure.     If you have severe callouses and cracking, you may apply heavy greasy ointments that you scoop up such as Cetaphil, Eucerin, Aquaphor or Vaseline.  For more aggressive help apply heavy creams or ointment under occlusive dressings such as Saran Wrap or Jelly Feet while sleeping.   Jelly Feet can be obtained at [email or web address].     To be successful with managing hyperkeratotic skin, you must manage hygiene daily.  At your bath or shower time is the easiest time to work on this when skin is most soft.  There is no medical or surgical treatment that will absolutely eliminate many of these symptoms.      Pedifix is a reliable source for all sorts of foot pads, cushions, or interdigital spacers and foot appliances. Go to www.[employer] or request a catalog at 4-040-Prowl.        Please call with any additional questions.             Follow-ups after your visit        Your next 10 appointments already scheduled     May 11, 2018  9:00 AM CDT   LAB with NL LAB [facility] ([facility])    [address] [facility] [address] [phone]           Please do not eat 10-12 hours before your appointment if you are coming in fasting for labs on lipids, cholesterol, or glucose (sugar). This does not apply to pregnant women. Water, hot tea and black coffee (with nothing added) are okay. Do not drink other fluids, diet soda or chew gum.              Who to contact     If you have questions or need follow up information about today's clinic visit or your schedule please contact [facility] directly at [phone].  Normal or non-critical lab and imaging results will be communicated to  "you by MyChart, letter or phone within 4 business days after the clinic has received the results. If you do not hear from us within 7 days, please contact the clinic through [employer] or phone. If you have a critical or abnormal lab result, we will notify you by phone as soon as possible.  Submit refill requests through [employer] or call your pharmacy and they will forward the refill request to us. Please allow 3 business days for your refill to be completed.          Additional Information About Your Visit        [employer] Information     [employer] lets you send messages to your doctor, view your test results, renew your prescriptions, schedule appointments and more. To sign up, go to [email or web address] . Click on \"Log in\" on the left side of the screen, which will take you to the Welcome page. Then click on \"Sign up Now\" on the right side of the page.     You will be asked to enter the access code listed below, as well as some personal information. Please follow the directions to create your username and password.     Your access code is: [email or web address]  Expires: 2018  8:24 AM     Your access code will  in 90 days. If you need help or a new code, please call your [facility] clinic or [phone].        Care EveryWhere ID     This is your Care EveryWhere ID. This could be used by other organizations to access your [facility] medical records  [id number]        Your Vitals Were     Temperature Height BMI (Body Mass Index)             97.4  F (36.3  C) (Temporal) 5' 11\" (1.803 m) 29.43 kg/m2          Blood Pressure from Last 3 Encounters:   18 140/72   10/30/17 122/70   10/14/16 138/80    Weight from Last 3 Encounters:   18 211 lb (95.7 kg)   10/30/17 206 lb 3.2 oz (93.5 kg)   10/14/16 211 lb 9.6 oz (96 kg)              Today, you had the following     No orders found for display       Primary Care Provider Office Phone # Fax #    Corky Yang [phone] [address] [place] [address]        Equal Access to Services     [facility]: Hadii aad ku hadrosalvaaugustine Sojerodali, wamaryannda luqadaha, qaybta jeniferyoungkatt cordoba. So [facility] [phone].    ATENCIÓN: Si habla español, tiene a martinez disposición servicios gratuitos de asistencia lingüística. [facility] [phone].    We comply with applicable federal civil rights laws and [place] laws. We do not discriminate on the basis of race, color, national origin, age, disability, sex, sexual orientation, or gender identity.            Thank you!     Thank you for choosing [facility]  for your care. Our goal is always to provide you with excellent care. Hearing back from our patients is one way we can continue to improve our services. Please take a few minutes to complete the written survey that you may receive in the mail after your visit with us. Thank you!             Your Updated Medication List - Protect others around you: Learn how to safely use, store and throw away your medicines at www.disposemymeds.org.          This list is accurate as of 5/7/18  8:25 AM.  Always use your most recent med list.                   Brand Name Dispense Instructions for use Diagnosis    ACCU-CHEK BAL PLUS test strip   Generic drug:  blood glucose monitoring     100 each    USE TO TEST BLOOD SUGARS 1-3 TIMES DAILY AS DIRECTED.    Diabetic polyneuropathy associated with type 2 diabetes mellitus (H)       ASPIRIN NOT PRESCRIBED    INTENTIONAL    0 each    1 each continuous prn for other Antiplatelet medication not prescribed intentionally due to Uncontrolled hypertension (systolic > 180, diastolic > 100) and Current thienopryridine therapy    Diabetic polyneuropathy associated with type 2 diabetes mellitus (H)       clorazepate 3.75 MG tablet    TRANXENE-T    30 tablet    Take 1 tablet (3.75 mg) by mouth nightly as needed    Psychophysiological insomnia       gabapentin 300 MG capsule    NEURONTIN     90 capsule    Take 1 capsule (300 mg) by mouth 2 times daily as needed    Diabetic polyneuropathy associated with type 2 diabetes mellitus (H)       glipiZIDE 10 MG tablet    GLUCOTROL    180 tablet    Take 1 tablet by mouth 2 times daily (before meals).        hydrochlorothiazide 25 MG tablet    HYDRODIURIL    90 tablet    Take 1 tablet (25 mg) by mouth daily    CKD (chronic kidney disease) stage 3, GFR 30-59 ml/min, Hypertension goal BP (blood pressure) < 130/80       LORazepam 0.5 MG tablet    ATIVAN    20 tablet    Take 1 tablet (0.5 mg) by mouth nightly as needed for anxiety    Anxiety state, unspecified       losartan 100 MG tablet    COZAAR    30 tablet    Take 0.5 tablets (50 mg) by mouth 2 times daily    Type 2 diabetes, HbA1C goal < 8% (H), Hypertension, goal below 140/90       Olodaterol HCl 2.5 MCG/ACT Aers      Inhale 1 puff into the lungs        pantoprazole 40 MG EC tablet    PROTONIX    90 tablet    TAKE ONE TABLET BY MOUTH ONCE DAILY    Gastroesophageal reflux disease without esophagitis       PLAVIX 75 MG tablet   Generic drug:  clopidogrel      1 TABLET DAILY        STATIN NOT PRESCRIBED (INTENTIONAL)     0 each    Statin not prescribed intentionally due to Intolerance    Type 2 diabetes, HbA1C goal < 8% (H)

## 2018-05-11 DIAGNOSIS — D64.9 ANEMIA: ICD-10-CM

## 2018-05-11 LAB
CRP SERPL-MCNC: 9.4 MG/L (ref 0–8)
ERYTHROCYTE [DISTWIDTH] IN BLOOD BY AUTOMATED COUNT: 16.3 % (ref 10–15)
ERYTHROCYTE [SEDIMENTATION RATE] IN BLOOD BY WESTERGREN METHOD: 41 MM/H (ref 0–20)
FERRITIN SERPL-MCNC: 27 NG/ML (ref 26–388)
HCT VFR BLD AUTO: 36.7 % (ref 40–53)
HGB BLD-MCNC: 11.4 G/DL (ref 13.3–17.7)
IRON SATN MFR SERPL: 28 % (ref 15–46)
IRON SERPL-MCNC: 97 UG/DL (ref 35–180)
MCH RBC QN AUTO: 29.5 PG (ref 26.5–33)
MCHC RBC AUTO-ENTMCNC: 31.1 G/DL (ref 31.5–36.5)
MCV RBC AUTO: 95 FL (ref 78–100)
PLATELET # BLD AUTO: 422 10E9/L (ref 150–450)
RBC # BLD AUTO: 3.87 10E12/L (ref 4.4–5.9)
TIBC SERPL-MCNC: 345 UG/DL (ref 240–430)
WBC # BLD AUTO: 12.2 10E9/L (ref 4–11)

## 2018-05-11 PROCEDURE — 84165 PROTEIN E-PHORESIS SERUM: CPT | Performed by: INTERNAL MEDICINE

## 2018-05-11 PROCEDURE — 85027 COMPLETE CBC AUTOMATED: CPT | Performed by: INTERNAL MEDICINE

## 2018-05-11 PROCEDURE — 36415 COLL VENOUS BLD VENIPUNCTURE: CPT | Performed by: INTERNAL MEDICINE

## 2018-05-11 PROCEDURE — 83550 IRON BINDING TEST: CPT | Performed by: INTERNAL MEDICINE

## 2018-05-11 PROCEDURE — 85652 RBC SED RATE AUTOMATED: CPT | Performed by: INTERNAL MEDICINE

## 2018-05-11 PROCEDURE — 86140 C-REACTIVE PROTEIN: CPT | Performed by: INTERNAL MEDICINE

## 2018-05-11 PROCEDURE — 82728 ASSAY OF FERRITIN: CPT | Performed by: INTERNAL MEDICINE

## 2018-05-11 PROCEDURE — 83540 ASSAY OF IRON: CPT | Performed by: INTERNAL MEDICINE

## 2018-05-14 LAB
ALBUMIN SERPL ELPH-MCNC: 4.4 G/DL (ref 3.7–5.1)
ALPHA1 GLOB SERPL ELPH-MCNC: 0.4 G/DL (ref 0.2–0.4)
ALPHA2 GLOB SERPL ELPH-MCNC: 1 G/DL (ref 0.5–0.9)
B-GLOBULIN SERPL ELPH-MCNC: 0.9 G/DL (ref 0.6–1)
GAMMA GLOB SERPL ELPH-MCNC: 0.9 G/DL (ref 0.7–1.6)
M PROTEIN SERPL ELPH-MCNC: 0 G/DL
PROT PATTERN SERPL ELPH-IMP: ABNORMAL

## 2018-06-15 DIAGNOSIS — D64.9 ANEMIA: Primary | ICD-10-CM

## 2018-07-16 DIAGNOSIS — D64.9 ANEMIA: ICD-10-CM

## 2018-07-16 LAB
ERYTHROCYTE [DISTWIDTH] IN BLOOD BY AUTOMATED COUNT: 16.4 % (ref 10–15)
FERRITIN SERPL-MCNC: 31 NG/ML (ref 26–388)
HCT VFR BLD AUTO: 38.4 % (ref 40–53)
HGB BLD-MCNC: 11.9 G/DL (ref 13.3–17.7)
IRON SATN MFR SERPL: 24 % (ref 15–46)
IRON SERPL-MCNC: 73 UG/DL (ref 35–180)
MCH RBC QN AUTO: 29.7 PG (ref 26.5–33)
MCHC RBC AUTO-ENTMCNC: 31 G/DL (ref 31.5–36.5)
MCV RBC AUTO: 96 FL (ref 78–100)
PLATELET # BLD AUTO: 285 10E9/L (ref 150–450)
RBC # BLD AUTO: 4.01 10E12/L (ref 4.4–5.9)
TIBC SERPL-MCNC: 301 UG/DL (ref 240–430)
WBC # BLD AUTO: 10.4 10E9/L (ref 4–11)

## 2018-07-16 PROCEDURE — 36415 COLL VENOUS BLD VENIPUNCTURE: CPT | Performed by: INTERNAL MEDICINE

## 2018-07-16 PROCEDURE — 82728 ASSAY OF FERRITIN: CPT | Performed by: INTERNAL MEDICINE

## 2018-07-16 PROCEDURE — 85027 COMPLETE CBC AUTOMATED: CPT | Performed by: INTERNAL MEDICINE

## 2018-07-16 PROCEDURE — 83540 ASSAY OF IRON: CPT | Performed by: INTERNAL MEDICINE

## 2018-07-16 PROCEDURE — 83550 IRON BINDING TEST: CPT | Performed by: INTERNAL MEDICINE

## 2018-08-09 ENCOUNTER — APPOINTMENT (OUTPATIENT)
Dept: GENERAL RADIOLOGY | Facility: CLINIC | Age: 83
End: 2018-08-09
Attending: EMERGENCY MEDICINE
Payer: MEDICARE

## 2018-08-09 ENCOUNTER — HOSPITAL ENCOUNTER (EMERGENCY)
Facility: CLINIC | Age: 83
Discharge: HOME | End: 2018-08-09
Attending: EMERGENCY MEDICINE | Admitting: EMERGENCY MEDICINE
Payer: MEDICARE

## 2018-08-09 ENCOUNTER — TELEPHONE (OUTPATIENT)
Dept: FAMILY MEDICINE | Facility: OTHER | Age: 83
End: 2018-08-09

## 2018-08-09 VITALS
SYSTOLIC BLOOD PRESSURE: 204 MMHG | RESPIRATION RATE: 20 BRPM | OXYGEN SATURATION: 96 % | DIASTOLIC BLOOD PRESSURE: 84 MMHG | TEMPERATURE: 97.5 F | HEART RATE: 79 BPM

## 2018-08-09 DIAGNOSIS — E11.621 DIABETIC ULCER OF RIGHT HEEL ASSOCIATED WITH TYPE 2 DIABETES MELLITUS, UNSPECIFIED ULCER STAGE (H): ICD-10-CM

## 2018-08-09 DIAGNOSIS — L97.419 DIABETIC ULCER OF RIGHT HEEL ASSOCIATED WITH TYPE 2 DIABETES MELLITUS, UNSPECIFIED ULCER STAGE (H): ICD-10-CM

## 2018-08-09 PROCEDURE — 73630 X-RAY EXAM OF FOOT: CPT | Mod: TC,RT

## 2018-08-09 PROCEDURE — 99283 EMERGENCY DEPT VISIT LOW MDM: CPT | Performed by: EMERGENCY MEDICINE

## 2018-08-09 PROCEDURE — 99284 EMERGENCY DEPT VISIT MOD MDM: CPT | Mod: Z6 | Performed by: EMERGENCY MEDICINE

## 2018-08-09 RX ORDER — HYDROCODONE BITARTRATE AND ACETAMINOPHEN 5; 325 MG/1; MG/1
1 TABLET ORAL EVERY 4 HOURS PRN
Qty: 18 TABLET | Refills: 0 | Status: SHIPPED | OUTPATIENT
Start: 2018-08-09 | End: 2018-01-01

## 2018-08-09 NOTE — ED AVS SNAPSHOT
[facility] Emergency Department    [address] [place] [address]    Phone:  [phone]    Fax:  [phone]                                       Aston Linn   MRN: [id number]    Department:  [facility] Emergency Department   Date of Visit:  8/9/2018           Patient Information     Date Of Birth          9/21/1926        Your diagnoses for this visit were:     Diabetic ulcer of right heel associated with type 2 diabetes mellitus, unspecified ulcer stage (H)        You were seen by Michael Rivera MD.      Follow-up Information     Follow up with Ernie Lopez DPM.    Specialty:  Podiatry    Why:  For scheduled appointment that was provided to you.    Contact information:    [address] [place] [address] [phone]        Discharge References/Attachments     DIABETES: TREATING MINOR FOOT INFECTIONS (ENGLISH)      Your next 10 appointments already scheduled     Aug 13, 2018 10:00 AM CDT   New Visit with Ernie Lopez DPM   [facility]    [address] [place] [id number] [phone]              24 Hour Appointment Hotline       To make an appointment at any [facility], call [phone] ([phone]). If you don't have a family doctor or clinic, we will help you find one. [facility] are conveniently located to serve the needs of you and your family.             Review of your medicines      START taking        Dose / Directions Last dose taken    amoxicillin-clavulanate 875-125 MG per tablet   Commonly known as:  AUGMENTIN   Dose:  1 tablet   Quantity:  14 tablet        Take 1 tablet by mouth 2 times daily for 7 days   Refills:  0        HYDROcodone-acetaminophen 5-325 MG per tablet   Commonly known as:  NORCO   Dose:  1 tablet   Quantity:  18 tablet        Take 1 tablet by mouth every 4 hours as needed for pain   Refills:  0          Our records show that you are taking the  medicines listed below. If these are incorrect, please call your family doctor or clinic.        Dose / Directions Last dose taken    ACCU-CHEK BAL PLUS test strip   Quantity:  100 each   Generic drug:  blood glucose monitoring        USE TO TEST BLOOD SUGARS 1-3 TIMES DAILY AS DIRECTED.   Refills:  3        ASPIRIN NOT PRESCRIBED   Commonly known as:  INTENTIONAL   Dose:  1 each   Quantity:  0 each        1 each continuous prn for other Antiplatelet medication not prescribed intentionally due to Uncontrolled hypertension (systolic > 180, diastolic > 100) and Current thienopryridine therapy   Refills:  0        clorazepate 3.75 MG tablet   Commonly known as:  TRANXENE-T   Dose:  3.75 mg   Quantity:  30 tablet        Take 1 tablet (3.75 mg) by mouth nightly as needed   Refills:  5        gabapentin 300 MG capsule   Commonly known as:  NEURONTIN   Dose:  300 mg   Quantity:  90 capsule        Take 1 capsule (300 mg) by mouth 2 times daily as needed   Refills:  1        glipiZIDE 10 MG tablet   Commonly known as:  GLUCOTROL   Dose:  10 mg   Quantity:  180 tablet        Take 1 tablet by mouth 2 times daily (before meals).   Refills:  1        hydrochlorothiazide 25 MG tablet   Commonly known as:  HYDRODIURIL   Dose:  25 mg   Quantity:  90 tablet        Take 1 tablet (25 mg) by mouth daily   Refills:  1        LORazepam 0.5 MG tablet   Commonly known as:  ATIVAN   Dose:  0.5 mg   Quantity:  20 tablet        Take 1 tablet (0.5 mg) by mouth nightly as needed for anxiety   Refills:  0        losartan 100 MG tablet   Commonly known as:  COZAAR   Dose:  50 mg   Quantity:  30 tablet        Take 0.5 tablets (50 mg) by mouth 2 times daily   Refills:  1        Olodaterol HCl 2.5 MCG/ACT Aers   Dose:  1 puff        Inhale 1 puff into the lungs   Refills:  0        pantoprazole 40 MG EC tablet   Commonly known as:  PROTONIX   Quantity:  90 tablet        TAKE ONE TABLET BY MOUTH ONCE DAILY   Refills:  2        PLAVIX 75 MG tablet    Generic drug:  clopidogrel        1 TABLET DAILY   Refills:  0        STATIN NOT PRESCRIBED (INTENTIONAL)   Quantity:  0 each        Statin not prescribed intentionally due to Intolerance   Refills:  0                Information about OPIOIDS     PRESCRIPTION OPIOIDS: WHAT YOU NEED TO KNOW   We gave you an opioid (narcotic) pain medicine. It is important to manage your pain, but opioids are not always the best choice. You should first try all the other options your care team gave you. Take this medicine for as short a time (and as few doses) as possible.    Some activities can increase your pain, such as bandage changes or therapy sessions. It may help to take your pain medicine 30 to 60 minutes before these activities. Reduce your stress by getting enough sleep, working on hobbies you enjoy and practicing relaxation or meditation. Talk to your care team about ways to manage your pain beyond prescription opioids.    These medicines have risks:    DO NOT drive when on new or higher doses of pain medicine. These medicines can affect your alertness and reaction times, and you could be arrested for driving under the influence (DUI). If you need to use opioids long-term, talk to your care team about driving.    DO NOT operate heavy machinery    DO NOT do any other dangerous activities while taking these medicines.    DO NOT drink any alcohol while taking these medicines.     If the opioid prescribed includes acetaminophen, DO NOT take with any other medicines that contain acetaminophen. Read all labels carefully. Look for the word  acetaminophen  or  Tylenol.  Ask your pharmacist if you have questions or are unsure.    You can get addicted to pain medicines, especially if you have a history of addiction (chemical, alcohol or substance dependence). Talk to your care team about ways to reduce this risk.    All opioids tend to cause constipation. Drink plenty of water and eat foods that have a lot of fiber, such as  fruits, vegetables, prune juice, apple juice and high-fiber cereal. Take a laxative (Miralax, milk of magnesia, Colace, Senna) if you don t move your bowels at least every other day. Other side effects include upset stomach, sleepiness, dizziness, throwing up, tolerance (needing more of the medicine to have the same effect), physical dependence and slowed breathing.    Store your pills in a secure place, locked if possible. We will not replace any lost or stolen medicine. If you don t finish your medicine, please throw away (dispose) as directed by your pharmacist. The [place] [employer] has more information about safe disposal: https://www.pca.[facility].[place].us/living-green/managing-unwanted-medications        Prescriptions were sent or printed at these locations (2 Prescriptions)                   Other Prescriptions                Printed at Department/Unit printer (2 of 2)         amoxicillin-clavulanate (AUGMENTIN) 875-125 MG per tablet               HYDROcodone-acetaminophen (NORCO) 5-325 MG per tablet                Procedures and tests performed during your visit     XR Foot Right G/E 3 Views      Orders Needing Specimen Collection     None      Pending Results     Date and Time Order Name Status Description    8/9/2018 1056 XR Foot Right G/E 3 Views Preliminary             Pending Culture Results     No orders found from 8/7/2018 to 8/10/2018.            Pending Results Instructions     If you had any lab results that were not finalized at the time of your Discharge, you can call the ED Lab Result RN at [phone]. You will be contacted by this team for any positive Lab results or changes in treatment. The nurses are available 7 days a week from 10A to 6:30P.  You can leave a message 24 hours per day and they will return your call.        Thank you for choosing [facility]       Thank you for choosing [facility] for your care. Our goal is always to provide you with excellent care. Hearing back from  our patients is one way we can continue to improve our services. Please take a few minutes to complete the written survey that you may receive in the mail after you visit with us. Thank you!        Care EveryWhere ID     This is your Care EveryWhere ID. This could be used by other organizations to access your [facility] medical records  [id number]        Equal Access to Services     AMADA SU : Abelardo Ying, chacho bacon, katt reed . So [facility] [phone].    ATENCIÓN: Si habla español, tiene a martinez disposición servicios gratuitos de asistencia lingüística. Arlen al [phone].    We comply with applicable federal civil rights laws and [place] laws. We do not discriminate on the basis of race, color, national origin, age, disability, sex, sexual orientation, or gender identity.            After Visit Summary       This is your record. Keep this with you and show to your community pharmacist(s) and doctor(s) at your next visit.

## 2018-08-09 NOTE — ED AVS SNAPSHOT
[facility] Emergency Department    [address] [place] [address]    Phone:  [phone]    Fax:  [phone]                                       Aston Linn   MRN: [id number]    Department:  [facility] Emergency Department   Date of Visit:  8/9/2018           After Visit Summary Signature Page     I have received my discharge instructions, and my questions have been answered. I have discussed any challenges I see with this plan with the nurse or doctor.    ..........................................................................................................................................  Patient/Patient Representative Signature      ..........................................................................................................................................  Patient Representative Print Name and Relationship to Patient    ..................................................               ................................................  Date                                            Time    ..........................................................................................................................................  Reviewed by Signature/Title    ...................................................              ..............................................  Date                                                            Time

## 2018-08-09 NOTE — TELEPHONE ENCOUNTER
Aston Linn is a 89 year old male who calls with bilateral weaknesses in legs.    NURSING ASSESSMENT:  Description:  I spoke with pts wife who states his legs are weak and hard to walk. (No C2C on file). Wife wants to schedule appt to see Dr. Yang otherwise she will go to Emergency Room.   Onset/duration:  last 2 days it has gotten worse  Precip. factors:  Neuropathy    Allergies:   Allergies   Allergen Reactions     No Known Allergies      RECOMMENDED DISPOSITION:  No appt available for today so wife states she will take him to ED. Unable to traige since pt not available  Will comply with recommendation: N/A  If further questions/concerns or if symptoms do not improve, worsen or new symptoms develop, call your PCP or [facility] as soon as possible.    Reina Carlisle RN

## 2018-08-09 NOTE — ED PROVIDER NOTES
History     Chief Complaint   Patient presents with     Leg Pain     HPI  Aston Linn is a 89 year old male who presents with ongoing issues with bilateral leg pain and peripheral neuropathy due to diabetes.  He has known peripheral vascular disease.  Previous Doppler studies in 2014 showed minimal arterial insufficiency.  He has not had recent studies.  He presents today with increasing right heel pain for the last few days.  Difficulty walking due to the pain.  No known injury.  No fever or chills.  No open sores or lesions.  Does not have back pain or radicular symptoms.  No saddle paresthesia or loss of bowel or bladder.  He does have thick fungal nails but this is base line.  Also has some discoloration of the feet which is unchanged.  He has mild peripheral edema which is chronic and unchanged.  Tetanus is up-to-date    Problem List:    Patient Active Problem List    Diagnosis Date Noted     Iron deficiency anemia 10/28/2010     Priority: High     Acute blood loss anemia 10/27/2010     Priority: High     Hemorrhage of gastrointestinal tract 04/08/2004     Priority: High     Problem list name updated by automated process. 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  Document not previously scanned.  Validation form completed and sent with document to be scanned.  Code Status needs to be updated to reflect choices in most recent ACP document. Notification sent to Dr. KAMILAH Yang  for followup.   Confirmed/documented designated decision maker(s).  Added by Karli Bueno             Tobacco use disorder 11/21/2015     Priority: Medium     Type 2 diabetes mellitus with diabetic nephropathy (H) 09/10/2012     Priority: Medium     GERD (gastroesophageal reflux disease) 05/04/2012     Priority: Medium     CKD (chronic kidney disease) stage 3, GFR 30-59 ml/min 10/27/2010     Priority: Medium     COPD (chronic obstructive pulmonary disease) (H) 10/27/2010     Priority: Medium     Left carotid artery stenosis 10/27/2010     Priority: Medium     Hyperlipidemia LDL goal <100 10/27/2010     Priority: Medium     Hypertension goal BP (blood pressure) < 130/80 01/02/2003     Priority: Medium     Anxiety state 12/27/2006     Priority: Low     Problem list name updated by automated process. Provider to review       Other specified transient cerebral ischemias 02/26/2003     Priority: Low        Past Medical History:    Past Medical History:   Diagnosis Date     Aortic aneurysm (H)      Blood transfusion      Carotid artery stenosis      CKD (chronic kidney disease) stage 3, GFR 30-59 ml/min      COPD (chronic obstructive pulmonary disease) (H)      Diabetic eye exam (H) 03/29/11     Diabetic eye exam (H) 02/12/2015     Emphysema of lung (H)      Hypertension      Peptic ulcer disease with hemorrhage      TIA (transient ischaemic attack)      Type II or unspecified type diabetes mellitus without mention of complication, not stated as uncontrolled        Past Surgical History:    Past Surgical History:   Procedure Laterality Date     AMPUTATION      PARTIAL LEFT INDEX FINGERTIP     APPENDECTOMY       COLONOSCOPY  11/15/2010    COMBINED COLONOSCOPY, SINGLE BIOPSY/POLYPECTOMY BY BIOPSY performed by JUDSON TOWNSEND at  GI     COLONOSCOPY  11/15/2010    COMBINED COLONOSCOPY, REMOVE TUMOR/POLYP/LESION BY SNARE performed by JUDSON TOWNSEND at  GI     ESOPHAGOSCOPY, GASTROSCOPY, DUODENOSCOPY (EGD), COMBINED  10/28/2010    COMBINED  ESOPHAGOSCOPY, GASTROSCOPY, DUODENOSCOPY (EGD) performed by MORE ROBISON at  OR      REMV CATARACT EXTRACAP,INSERT LENS  07/20/06    right eye     HC REMV CATARACT EXTRACAP,INSERT LENS  8/17/2006    left eye     HEAD & NECK SURGERY      CERVICAL SPINE     ORTHOPEDIC SURGERY      CERVICAL FUSION       Family History:    Family History   Problem Relation Age of Onset     Diabetes Mother      Cancer Sister      brain       Social History:  Marital Status:   [2]  Social History   Substance Use Topics     Smoking status: Current Some Day Smoker     Packs/day: 0.25     Years: 60.00     Types: Cigarettes     Smokeless tobacco: Never Used     Alcohol use No        Medications:      amoxicillin-clavulanate (AUGMENTIN) 875-125 MG per tablet   HYDROcodone-acetaminophen (NORCO) 5-325 MG per tablet   ACCU-CHEK BAL PLUS test strip   ASPIRIN NOT PRESCRIBED, INTENTIONAL,   clorazepate (TRANXENE) 3.75 MG tablet   gabapentin (NEURONTIN) 300 MG capsule   glipiZIDE (GLUCOTROL) 10 MG tablet   hydrochlorothiazide (HYDRODIURIL) 25 MG tablet   LORazepam (ATIVAN) 0.5 MG tablet   losartan (COZAAR) 100 MG tablet   Olodaterol HCl 2.5 MCG/ACT AERS   pantoprazole (PROTONIX) 40 MG EC tablet   PLAVIX 75 MG OR TABS   STATIN NOT PRESCRIBED, INTENTIONAL,         Review of Systems all other systems reviewed and are negative.    Physical Exam   BP: (!) 204/84  Pulse: 79  Temp: 97.5  F (36.4  C)  Resp: 20  SpO2: 96 %      Physical Exam general alert cooperative male in mild to moderate distress.  Examination shows bilateral lower extremity trace edema.  No calf or thigh tenderness.  No knee or ankle effusion.  Discoloration of the toes.  Thick fungal toenails.  Palpable pulses in both feet.  On his right heel there is a ulcer and surrounding erythema.  On palpation this is tender but no fluctuance is felt.  Cannot express any pus.        ED Course     ED Course     Procedures           Results for orders placed or performed during  the hospital encounter of 08/09/18   XR Foot Right G/E 3 Views    Narrative    RIGHT FOOT THREE OR MORE VIEWS   8/9/2018 11:12 AM     HISTORY: Diabetic patient with heel ulcer concerns for osteomyelitis.     COMPARISON: None.     FINDINGS: Moderate to severe degenerative changes of the first  metatarsophalangeal joint with joint space loss, subchondral  eburnation and osteophyte formation are noted. No fracture,  malalignment, or other significant joint space loss is seen. The  plantar arch is high. Mild enthesopathic changes are seen at the  Achilles tendon insertion into the calcaneus.      Impression    IMPRESSION:  1. Moderate-severe degenerative changes of the first  metatarsophalangeal joint.  2. High plantar arch.  3. Mild enthesopathic changes of the Achilles tendon insertion into  the calcaneus.  4. No cortical irregularity to suggest osteomyelitis of the calcaneus  at the plantar calcaneal fat pad.            Critical Care time:  none               Results for orders placed or performed during the hospital encounter of 08/09/18 (from the past 24 hour(s))   XR Foot Right G/E 3 Views    Narrative    RIGHT FOOT THREE OR MORE VIEWS   8/9/2018 11:12 AM     HISTORY: Diabetic patient with heel ulcer concerns for osteomyelitis.     COMPARISON: None.     FINDINGS: Moderate to severe degenerative changes of the first  metatarsophalangeal joint with joint space loss, subchondral  eburnation and osteophyte formation are noted. No fracture,  malalignment, or other significant joint space loss is seen. The  plantar arch is high. Mild enthesopathic changes are seen at the  Achilles tendon insertion into the calcaneus.      Impression    IMPRESSION:  1. Moderate-severe degenerative changes of the first  metatarsophalangeal joint.  2. High plantar arch.  3. Mild enthesopathic changes of the Achilles tendon insertion into  the calcaneus.  4. No cortical irregularity to suggest osteomyelitis of the calcaneus  at the plantar  calcaneal fat pad.       Medications - No data to display  Right foot x-ray is ordered to assess for bony abnormality or osteomyelitis  Assessments & Plan (with Medical Decision Making)   Patient is a 89-year-old male with a long history of diabetes and peripheral neuropathy.  Chronic leg pain and swelling.  On Neurontin.  Over the last few days has had increased right heel pain without injury.  No fever or chills.  No open sores or lesions.  On exam he does have a ulcer noted over the heel.  This area of tenderness.  Surrounding erythema.  No fluctuance.  X-ray was obtained and showed no bony abnormality that would be worrisome for osteomyelitis.  He will be given information on ulcer.  Augmentin secondary infection.  Vicodin for pain.  Follow-up with Dr. Lopez from podiatry for possible debridement.  He has seen the patient previously.  Reasons to return to emergency room were discussed.  I have reviewed the nursing notes.    I have reviewed the findings, diagnosis, plan and need for follow up with the patient.       New Prescriptions    AMOXICILLIN-CLAVULANATE (AUGMENTIN) 875-125 MG PER TABLET    Take 1 tablet by mouth 2 times daily for 7 days    HYDROCODONE-ACETAMINOPHEN (NORCO) 5-325 MG PER TABLET    Take 1 tablet by mouth every 4 hours as needed for pain       Final diagnoses:   Diabetic ulcer of right heel associated with type 2 diabetes mellitus, unspecified ulcer stage (H)       8/9/2018   [facility] EMERGENCY DEPARTMENT     Michael Rivera MD  08/09/18 0782

## 2018-08-10 DIAGNOSIS — D64.9 ANEMIA: Primary | ICD-10-CM

## 2018-08-13 ENCOUNTER — OFFICE VISIT (OUTPATIENT)
Dept: PODIATRY | Facility: CLINIC | Age: 83
End: 2018-08-13
Payer: COMMERCIAL

## 2018-08-13 VITALS
BODY MASS INDEX: 29.54 KG/M2 | HEIGHT: 71 IN | TEMPERATURE: 97.8 F | WEIGHT: 211 LBS | DIASTOLIC BLOOD PRESSURE: 74 MMHG | SYSTOLIC BLOOD PRESSURE: 172 MMHG

## 2018-08-13 DIAGNOSIS — L97.411 ULCER OF RIGHT HEEL AND MIDFOOT, LIMITED TO BREAKDOWN OF SKIN (H): ICD-10-CM

## 2018-08-13 DIAGNOSIS — E11.40 TYPE 2 DIABETES MELLITUS WITH DIABETIC NEUROPATHY, WITHOUT LONG-TERM CURRENT USE OF INSULIN (H): ICD-10-CM

## 2018-08-13 DIAGNOSIS — L60.3 ONYCHODYSTROPHY: ICD-10-CM

## 2018-08-13 DIAGNOSIS — I73.9 PERIPHERAL VASCULAR DISEASE (H): Primary | ICD-10-CM

## 2018-08-13 PROCEDURE — 99213 OFFICE O/P EST LOW 20 MIN: CPT | Mod: 25 | Performed by: PODIATRIST

## 2018-08-13 PROCEDURE — 11721 DEBRIDE NAIL 6 OR MORE: CPT | Mod: Q8 | Performed by: PODIATRIST

## 2018-08-13 ASSESSMENT — PAIN SCALES - GENERAL: PAINLEVEL: EXTREME PAIN (9)

## 2018-08-13 NOTE — LETTER
8/13/2018         RE: Aston Linn  [address] [facility] [address]        Dear Colleague,    Thank you for referring your patient, Aston Linn, to the [facility]. Please see a copy of my visit note below.    8/13/2018 - NEW ISSUE TODAY - 8/9/2018 - ED follow up  HPI:  Aston Linn is a 89 year old male who is seen in consultation at the request of ED Dept - Michael MD Miguel.    Pt presents for eval of:   (Onset, Location, L/R, Character, Treatments, Injury if yes)    XR Right foot 8/9/2018    1. DM Type 2     2. Onset early Aug 2018, DM ulcer posterior Right heel. No injury noted.  Constant, sharp, stabbing, burning, numbness, tingling, redness, pain 9.    BMI is normal.    Patient to follow up with Primary Care provider regarding elevated blood pressure.  _______________________________    Review of Systems:  Patient denies fever, chills, rash, wound, stiffness, limping, numbness, weakness, heart burn, blood in stool, chest pain with activity, calf pain when walking, shortness of breath with activity, chronic cough, easy bleeding/bruising, swelling of ankles, excessive thirst, fatigue, depression, anxiety.  Patient admits only to symptoms noted in history.     PAST MEDICAL HISTORY:   Past Medical History:   Diagnosis Date     Aortic aneurysm (H)      Blood transfusion      Carotid artery stenosis     80% lesion on left     CKD (chronic kidney disease) stage 3, GFR 30-59 ml/min      COPD (chronic obstructive pulmonary disease) (H)      Diabetic eye exam (H) 03/29/11     Diabetic eye exam (H) 02/12/2015    Dr Attila Roper     Emphysema of lung (H)      Hypertension      Peptic ulcer disease with hemorrhage      TIA (transient ischaemic attack)      Type II or unspecified type diabetes mellitus without mention of complication, not stated as uncontrolled      PAST SURGICAL HISTORY:   Past Surgical History:   Procedure Laterality Date     AMPUTATION      PARTIAL LEFT INDEX  FINGERTIP     APPENDECTOMY       COLONOSCOPY  11/15/2010    COMBINED COLONOSCOPY, SINGLE BIOPSY/POLYPECTOMY BY BIOPSY performed by JUDSON TOWNSEND at  GI     COLONOSCOPY  11/15/2010    COMBINED COLONOSCOPY, REMOVE TUMOR/POLYP/LESION BY SNARE performed by JUDSON TOWNSEND at  GI     ESOPHAGOSCOPY, GASTROSCOPY, DUODENOSCOPY (EGD), COMBINED  10/28/2010    COMBINED ESOPHAGOSCOPY, GASTROSCOPY, DUODENOSCOPY (EGD) performed by MORE ROBISON at  OR     HC REMV CATARACT EXTRACAP,INSERT LENS  07/20/06    right eye     HC REMV CATARACT EXTRACAP,INSERT LENS  8/17/2006    left eye     HEAD & NECK SURGERY      CERVICAL SPINE     ORTHOPEDIC SURGERY      CERVICAL FUSION     MEDICATIONS:   Current Outpatient Prescriptions:      ACCU-CHEK BAL PLUS test strip, USE TO TEST BLOOD SUGARS 1-3 TIMES DAILY AS DIRECTED., Disp: 100 each, Rfl: 3     amoxicillin-clavulanate (AUGMENTIN) 875-125 MG per tablet, Take 1 tablet by mouth 2 times daily for 7 days, Disp: 14 tablet, Rfl: 0     ASPIRIN NOT PRESCRIBED, INTENTIONAL,, 1 each continuous prn for other Antiplatelet medication not prescribed intentionally due to Uncontrolled hypertension (systolic > 180, diastolic > 100) and Current thienopryridine therapy, Disp: 0 each, Rfl: 0     clorazepate (TRANXENE) 3.75 MG tablet, Take 1 tablet (3.75 mg) by mouth nightly as needed, Disp: 30 tablet, Rfl: 5     gabapentin (NEURONTIN) 300 MG capsule, Take 1 capsule (300 mg) by mouth 2 times daily as needed, Disp: 90 capsule, Rfl: 1     glipiZIDE (GLUCOTROL) 10 MG tablet, Take 1 tablet by mouth 2 times daily (before meals)., Disp: 180 tablet, Rfl: 1     hydrochlorothiazide (HYDRODIURIL) 25 MG tablet, Take 1 tablet (25 mg) by mouth daily, Disp: 90 tablet, Rfl: 1     HYDROcodone-acetaminophen (NORCO) 5-325 MG per tablet, Take 1 tablet by mouth every 4 hours as needed for pain, Disp: 18 tablet, Rfl: 0     LORazepam (ATIVAN) 0.5 MG tablet, Take 1 tablet (0.5 mg) by mouth nightly as needed for  "anxiety, Disp: 20 tablet, Rfl: 0     losartan (COZAAR) 100 MG tablet, Take 0.5 tablets (50 mg) by mouth 2 times daily, Disp: 30 tablet, Rfl: 1     Olodaterol HCl 2.5 MCG/ACT AERS, Inhale 1 puff into the lungs, Disp: , Rfl:      pantoprazole (PROTONIX) 40 MG EC tablet, TAKE ONE TABLET BY MOUTH ONCE DAILY, Disp: 90 tablet, Rfl: 2     PLAVIX 75 MG OR TABS, 1 TABLET DAILY, Disp: , Rfl:      STATIN NOT PRESCRIBED, INTENTIONAL,, Statin not prescribed intentionally due to Intolerance, Disp: 0 each, Rfl: 0  ALLERGIES:    Allergies   Allergen Reactions     No Known Allergies      SOCIAL HISTORY:   Social History     Social History     Marital status:      Spouse name: N/A     Number of children: N/A     Years of education: N/A     Occupational History     Not on file.     Social History Main Topics     Smoking status: Current Some Day Smoker     Packs/day: 0.25     Years: 60.00     Types: Cigarettes     Smokeless tobacco: Never Used     Alcohol use No     Drug use: No     Sexual activity: No     Other Topics Concern     Parent/Sibling W/ Cabg, Mi Or Angioplasty Before 65f 55m? No     Social History Narrative     FAMILY HISTORY:   Family History   Problem Relation Age of Onset     Diabetes Mother      Cancer Sister      brain     EXAM:Vitals: /74  Temp 97.8  F (36.6  C) (Temporal)  Ht 5' 11\" (1.803 m)  Wt 211 lb (95.7 kg)  BMI 29.43 kg/m2  BMI= Body mass index is 29.43 kg/(m^2).    General appearance: Patient is alert and fully cooperative with history & exam.  No sign of distress is noted during the visit.     Psychiatric: Affect is pleasant & appropriate.  Patient appears motivated to improve health.     Respiratory: Breathing is regular & unlabored while sitting.     HEENT: Hearing is intact to spoken word.  Speech is clear.  No gross evidence of visual impairment that would impact ambulation.     Vascular: DP 0/4 & PT 0/4 left & right.  CFT delayed with dependent rubor noted about the digits.  Diminished " hair growth distal to mid tibia and no hair about the foot and toes.  Temperature changes noted, warm to cool proximal to distal.  Hemosiderin pigmentation noted with multiple varicosities legs and feet bilateral. Generalized edema bilateral legs and feet.  Pt denies claudication history.    Dependent rubor noted on the right foot but not the left     Neurologic: Normal plantar response bilateral.  Loss of protective threshold plus 2/10 applications of a 5.07 monofilament.  Pt admits burning and paraesthesias about the feet and toes with palpation.     Dermatologic: All 10 nails are thickened, elongated, discolored and painful with palpation and debridement.  Diminished texture turgor and tone about the integument.  Skin is thin & shiny.  No Pre ulcerative hyperkeratosis noted.  No abscess or full thickness ulcerations noted.     Musculoskeletal: Patient is ambulatory without assistive device or brace.  There is semi reducible contracture of the lesser digits.    Labs:  2014 ABIs of 0.83-0.87, biphasic digital waveforms.      Hemoglobin A1C (%)   Date Value   04/09/2018 6.7 (H)   10/30/2017 6.3 (H)   04/20/2017 6.7 (H)   10/14/2016 6.6 (H)   04/20/2016 6.8   12/02/2015 6.8 (H)   09/10/2015 6.9 (H)   12/03/2014 7.4 (H)     Creatinine (mg/dL)   Date Value   04/09/2018 2.3 (H)   10/30/2017 2.51 (H)   04/20/2017 2.2 (H)   10/14/2016 2.47 (H)   04/20/2016 2.3   12/02/2015 2.61 (H)       ASSESSMENT:       ICD-10-CM    1. Peripheral vascular disease (H) I73.9 US KATE Doppler with Exercise Bilateral   2. Ulcer of right heel and midfoot, limited to breakdown of skin (H) L97.411 US KATE Doppler with Exercise Bilateral        PLAN:    5/7/2018  All 10 nails were debrided with a nail nipper.   We discussed risk factors and preventive measures.    We discussed appropriate hygiene, shoe gear, daily foot exam, and reinforced management of weight, diet, activity goals and HA1C goal for diabetic patients.    Dispensed written foot  care instructions.    All questions were answered to their satisfaction.    RTC once yearly or as needed with questions or concerns.      He obtains diabetic shoes elsewhere and does not wear them.  We reviewed risks associated with shoe gear and recommended follow-up once yearly for diabetic foot exam and evaluation of shoe gear.  Recommend he utilize his diabetic shoes.  We discussed loss of protective threshold and discussed how to obtain interdigital bathing more easily.  All questions were answered in regards to this.    Recommended noninvasive vascular exams for better baseline evaluation of arterial inflow however the patient refuses today.  In 2014 patient had biphasic waveforms ABIs of 0.83-0.87.  He understands there are risks associated with this and that it should be evaluated and monitored.  We will continue to monitor this upon future visits.    8/13/2018  Dependent rubor noted about the digits of the right foot but not on the left  He started Augmentin from ED 8/9/18 and pt notices no change  This appears consistent with arterial insufficiency.   Order ABIs.  Last done 2014.  Follow-up after obtaining the ABIs.  If these are significantly abnormal I would recommend a vascular consult.     They also requested nail debridment.  I debrided all 10 nails manually and mechanically  Written instructions dispensed.       Ernie Lopez DPM          Again, thank you for allowing me to participate in the care of your patient.        Sincerely,        Ernie Lopez DPM

## 2018-08-13 NOTE — MR AVS SNAPSHOT
"              After Visit Summary   8/13/2018    Aston Linn    MRN: [id number]           Patient Information     Date Of Birth          9/21/1926        Visit Information        Provider Department      8/13/2018 10:00 AM Ernie Lopez DPM [facility]        Today's Diagnoses     Peripheral vascular disease (H)    -  1    Ulcer of right heel and midfoot, limited to breakdown of skin (H)          Care Instructions    Please call [phone] to schedule imaging that was ordered by Dr. Lopez today.  You will need to schedule a follow up appointment with Dr. Lopez a few days following your imaging to discuss results and plan of care by calling [phone].      Nail Debridement    A high quality instrument makes trimming toenails MUCH easier.  Search ebay for any 5\" nail nipper manufactured by reliable brands such as Miltex, Integra or Jarit as these quality instruments will help manage difficult nails more effectively and comfortably. We use Miltex -SS.  A physician is not necessary to trim nails even if you are taking blood thinners or are diabetic.  Your family or care givers may help manage your toenails.      Trim or sand the nails once weekly.  Do not wait until they are long and painful or trimming will become too difficult and painful and will increase your risk of complications or infection.  A course file or 120 grit sandpaper on a sanding block can be helpful.  For very thick nails many people prefer battery operated kyle such as an Amope', Personal Pedi and Emjoi for regular use or heavy painful callouses or thick toenails.    Trim or skive any portion of nail that is thick, loose, crumbling, or not well attached. Do not tear the nail away, but rather cut them with a nail nipperor sand or sand them down.  You may follow up with your Podiatric Physician if you have pain, bleeding, infection, questions or other concerns.      You may also contact the following Registered " Nurses for further help with nail debridement and minor hygiene concnerns.  They may come to your home or meet them at their clinic to trim your toenails and soak your feet, as well as monitor for any complications that would require evaluation by a Physician.        [facility]  Xiao Valera, RN  Office [phone]    [facility]  Yuly Silver RN  [phone]   Call or text for appointment  Some home visits and has a clinic at:  [address] [place], [place] [address]    [facility]  [phone]  [facility]    [facility]  [phone]  [email or web address]  [facility]    For up to date list and to find foot care nurses in other communities visit [employer] Association website:  afcna.org.         Calluses, Corns, IPKs, Porokeratosis    When there is excessive friction or pressure on the skin, the body responds by making the skin thicker.  While this may protect the deeper structures, the thickened skin can take up more space and thus increase pressure over a bony prominence or become an open sore or skin ulcer as this skin becomes less flexible.    Flat, diffuse thickening are simple calluses and they are usually caused by friction.  Often these are the result of rubbing on a shoe or going barefoot.    Calluses with a central core between the toes are called corns.  These often result from prominent joints on adjacent toes rubbing together.  Theses are often a symptom of bone malalignment and will usually recur unless the underlying bones are addressed.    Many of these lesions can be kept comfortable with routine maintenance. This consists of filing them with a Ped Egg, callus file, or 120 grit sandpaper on a block, every day during your bath or shower.  Most people prefer battery operated kyle such as an Amope', Personal Pedi and Emjoi for regular use or heavy painful callouses.  Heavy creams or ointments  can be applied 1-2 times every day to keep them soft. Toe spacers can be used for corns, gel pads can be used for other lesions on the bottom of the foot. If there is a deformity noted, such as a prominent bone, often this can be addressed to minimize recurrence. However, sometimes the pressure and lesion simply migrates to another spot after surgery, so it is not a guaranteed cure.     If you have severe callouses and cracking, you may apply heavy greasy ointments that you scoop up such as Cetaphil cream, Eucerin, Aquaphor or Vaseline.  Be sure to obtain cream or ointment in these brands and not lotion (lotion is water based and not durable enough for feet). For more aggressive help apply heavy creams or ointment under occlusive dressings such as Saran Wrap or Jelly Feet while sleeping.   Jelly Feet can be obtained at [email or web address].     To be successful with managing hyperkeratotic skin, you must manage hygiene daily.  At your bath or shower time is the easiest time to work on this when skin is most soft.  There is no medical or surgical treatment that will absolutely eliminate many of these symptoms.      Ped[employer]ix is a reliable source for all sorts of foot pads, cushions, or interdigital spacers and foot appliances. Go to www.[facility].com or request a catalog at 7-949-[employer].        Please call with any additional questions.                 Follow-ups after your visit        Your next 10 appointments already scheduled     Aug 16, 2018  1:00 PM CDT   US KATE DOPPLER WITH EXERCISE BILATERAL with PHUS3   [facility] ([facility])    [address] [place] [address] [phone]           Please bring a list of your medicines (including vitamins, minerals and over-the-counter drugs). Also, tell your doctor about any allergies you may have. Wear comfortable clothes and leave your valuables at home.  No caffeine or tobacco for 1 hour prior to exam.  Please call the  "Imaging Department at your exam site with any questions.            Aug 23, 2018 10:15 AM CDT   Return Visit with Ernie Lopez DPM   [facility] ([facility]    [address] [facility] [address] [phone]              Future tests that were ordered for you today     Open Future Orders        Priority Expected Expires Ordered    US KATE Doppler with Exercise Bilateral Routine 8/13/2018 9/27/2018 8/13/2018            Who to contact     If you have questions or need follow up information about today's clinic visit or your schedule please contact [facility] directly at [phone].  Normal or non-critical lab and imaging results will be communicated to you by MyChart, letter or phone within 4 business days after the clinic has received the results. If you do not hear from us within 7 days, please contact the clinic through MyChart or phone. If you have a critical or abnormal lab result, we will notify you by phone as soon as possible.  Submit refill requests through [employer] or call your pharmacy and they will forward the refill request to us. Please allow 3 business days for your refill to be completed.          Additional Information About Your Visit        Care EveryWhere ID     This is your Care EveryWhere ID. This could be used by other organizations to access your [place] medical records  [id number]        Your Vitals Were     Temperature Height BMI (Body Mass Index)             97.8  F (36.6  C) (Temporal) 5' 11\" (1.803 m) 29.43 kg/m2          Blood Pressure from Last 3 Encounters:   08/13/18 172/74   08/09/18 (!) 204/84   05/07/18 140/72    Weight from Last 3 Encounters:   08/13/18 211 lb (95.7 kg)   05/07/18 211 lb (95.7 kg)   10/30/17 206 lb 3.2 oz (93.5 kg)              We Performed the Following     DEBRIDEMENT OF NAILS, 6 OR MORE        Primary Care Provider Office Phone # Fax #    Corky Yang [phone]    "    [address] [place] [address]        Equal Access to Services     [facility] : Hadii lino tate catherineaugustine Diegoali, wamaryannda janniedonatoha, qanathanta jeniferyoungashley rodriguez, katt robisonmiguelosvaldo hill. So [facility] [phone].    ATENCIÓN: Si habla español, tiene a martinez disposición servicios gratuitos de asistencia lingüística. Llame al [phone].    We comply with applicable federal civil rights laws and [place] laws. We do not discriminate on the basis of race, color, national origin, age, disability, sex, sexual orientation, or gender identity.            Thank you!     Thank you for choosing [facility]  for your care. Our goal is always to provide you with excellent care. Hearing back from our patients is one way we can continue to improve our services. Please take a few minutes to complete the written survey that you may receive in the mail after your visit with us. Thank you!             Your Updated Medication List - Protect others around you: Learn how to safely use, store and throw away your medicines at www.disposemymeds.org.          This list is accurate as of 8/13/18 11:00 AM.  Always use your most recent med list.                   Brand Name Dispense Instructions for use Diagnosis    ACCU-CHEK BAL PLUS test strip   Generic drug:  blood glucose monitoring     100 each    USE TO TEST BLOOD SUGARS 1-3 TIMES DAILY AS DIRECTED.    Diabetic polyneuropathy associated with type 2 diabetes mellitus (H)       amoxicillin-clavulanate 875-125 MG per tablet    AUGMENTIN    14 tablet    Take 1 tablet by mouth 2 times daily for 7 days        ASPIRIN NOT PRESCRIBED    INTENTIONAL    0 each    1 each continuous prn for other Antiplatelet medication not prescribed intentionally due to Uncontrolled hypertension (systolic > 180, diastolic > 100) and Current thienopryridine therapy    Diabetic polyneuropathy associated with type 2 diabetes mellitus (H)       clorazepate 3.75 MG tablet     TRANXENE-T    30 tablet    Take 1 tablet (3.75 mg) by mouth nightly as needed    Psychophysiological insomnia       gabapentin 300 MG capsule    NEURONTIN    90 capsule    Take 1 capsule (300 mg) by mouth 2 times daily as needed    Diabetic polyneuropathy associated with type 2 diabetes mellitus (H)       glipiZIDE 10 MG tablet    GLUCOTROL    180 tablet    Take 1 tablet by mouth 2 times daily (before meals).        hydrochlorothiazide 25 MG tablet    HYDRODIURIL    90 tablet    Take 1 tablet (25 mg) by mouth daily    CKD (chronic kidney disease) stage 3, GFR 30-59 ml/min, Hypertension goal BP (blood pressure) < 130/80       HYDROcodone-acetaminophen 5-325 MG per tablet    NORCO    18 tablet    Take 1 tablet by mouth every 4 hours as needed for pain        LORazepam 0.5 MG tablet    ATIVAN    20 tablet    Take 1 tablet (0.5 mg) by mouth nightly as needed for anxiety    Anxiety state, unspecified       losartan 100 MG tablet    COZAAR    30 tablet    Take 0.5 tablets (50 mg) by mouth 2 times daily    Type 2 diabetes, HbA1C goal < 8% (H), Hypertension, goal below 140/90       Olodaterol HCl 2.5 MCG/ACT Aers      Inhale 1 puff into the lungs        pantoprazole 40 MG EC tablet    PROTONIX    90 tablet    TAKE ONE TABLET BY MOUTH ONCE DAILY    Gastroesophageal reflux disease without esophagitis       PLAVIX 75 MG tablet   Generic drug:  clopidogrel      1 TABLET DAILY        STATIN NOT PRESCRIBED (INTENTIONAL)     0 each    Statin not prescribed intentionally due to Intolerance    Type 2 diabetes, HbA1C goal < 8% (H)

## 2018-08-13 NOTE — PATIENT INSTRUCTIONS
"Please call [phone] to schedule imaging that was ordered by Dr. Lopez today.  You will need to schedule a follow up appointment with Dr. Lopez a few days following your imaging to discuss results and plan of care by calling [phone].      Nail Debridement    A high quality instrument makes trimming toenails MUCH easier.  Search ebay for any 5\" nail nipper manufactured by reliable brands such as Miltex, Integra or Jarit as these quality instruments will help manage difficult nails more effectively and comfortably. We use Miltex -SS.  A physician is not necessary to trim nails even if you are taking blood thinners or are diabetic.  Your family or care givers may help manage your toenails.      Trim or sand the nails once weekly.  Do not wait until they are long and painful or trimming will become too difficult and painful and will increase your risk of complications or infection.  A course file or 120 grit sandpaper on a sanding block can be helpful.  For very thick nails many people prefer battery operated kyle such as an Mobile Patrol', Personal Pedi and Emjoi for regular use or heavy painful callouses or thick toenails.    Trim or skive any portion of nail that is thick, loose, crumbling, or not well attached. Do not tear the nail away, but rather cut them with a nail nipperor sand or sand them down.  You may follow up with your Podiatric Physician if you have pain, bleeding, infection, questions or other concerns.      You may also contact the following Registered Nurses for further help with nail debridement and minor hygiene concnerns.  They may come to your home or meet them at their clinic to trim your toenails and soak your feet, as well as monitor for any complications that would require evaluation by a Physician.        [facility]  Xiao Valera, RN  Office [phone]    [facility]  Yuly Silver RN  [phone]   Call or text for appointment  Some home " visits and has a clinic at:  [address] [place] [id number]    [facility]  [phone]  [facility]  [place]    [facility]  [phone]  [email or web address]  [facility]    For up to date list and to find foot care nurses in other communities visit [employer] website:  afcna.org.         Calluses, Corns, IPKs, Porokeratosis    When there is excessive friction or pressure on the skin, the body responds by making the skin thicker.  While this may protect the deeper structures, the thickened skin can take up more space and thus increase pressure over a bony prominence or become an open sore or skin ulcer as this skin becomes less flexible.    Flat, diffuse thickening are simple calluses and they are usually caused by friction.  Often these are the result of rubbing on a shoe or going barefoot.    Calluses with a central core between the toes are called corns.  These often result from prominent joints on adjacent toes rubbing together.  Theses are often a symptom of bone malalignment and will usually recur unless the underlying bones are addressed.    Many of these lesions can be kept comfortable with routine maintenance. This consists of filing them with a Ped Egg, callus file, or 120 grit sandpaper on a block, every day during your bath or shower.  Most people prefer battery operated kyle such as an Amope', Personal Pedi and Emjoi for regular use or heavy painful callouses.  Heavy creams or ointments can be applied 1-2 times every day to keep them soft. Toe spacers can be used for corns, gel pads can be used for other lesions on the bottom of the foot. If there is a deformity noted, such as a prominent bone, often this can be addressed to minimize recurrence. However, sometimes the pressure and lesion simply migrates to another spot after surgery, so it is not a guaranteed cure.     If you have severe callouses and cracking, you may  apply heavy greasy ointments that you scoop up such as Cetaphil cream, Eucerin, Aquaphor or Vaseline.  Be sure to obtain cream or ointment in these brands and not lotion (lotion is water based and not durable enough for feet). For more aggressive help apply heavy creams or ointment under occlusive dressings such as Saran Wrap or Jelly Feet while sleeping.   Jelly Feet can be obtained at [email or web address].     To be successful with managing hyperkeratotic skin, you must manage hygiene daily.  At your bath or shower time is the easiest time to work on this when skin is most soft.  There is no medical or surgical treatment that will absolutely eliminate many of these symptoms.      Pedifix is a reliable source for all sorts of foot pads, cushions, or interdigital spacers and foot appliances. Go to [email or web address] or request a catalog at 5-604-Free & Clear.        Please call with any additional questions.

## 2018-08-13 NOTE — PROGRESS NOTES
8/13/2018 - NEW ISSUE TODAY - 8/9/2018 - ED follow up  HPI:  Aston Linn is a 89 year old male who is seen in consultation at the request of ED Dept - Michael MD Miguel.    Pt presents for eval of:   (Onset, Location, L/R, Character, Treatments, Injury if yes)    XR Right foot 8/9/2018    1. DM Type 2     2. Onset early Aug 2018, DM ulcer posterior Right heel. No injury noted.  Constant, sharp, stabbing, burning, numbness, tingling, redness, pain 9.    BMI is normal.    Patient to follow up with Primary Care provider regarding elevated blood pressure.  _______________________________    Review of Systems:  Patient denies fever, chills, rash, wound, stiffness, limping, numbness, weakness, heart burn, blood in stool, chest pain with activity, calf pain when walking, shortness of breath with activity, chronic cough, easy bleeding/bruising, swelling of ankles, excessive thirst, fatigue, depression, anxiety.  Patient admits only to symptoms noted in history.     PAST MEDICAL HISTORY:   Past Medical History:   Diagnosis Date     Aortic aneurysm (H)      Blood transfusion      Carotid artery stenosis     80% lesion on left     CKD (chronic kidney disease) stage 3, GFR 30-59 ml/min      COPD (chronic obstructive pulmonary disease) (H)      Diabetic eye exam (H) 03/29/11     Diabetic eye exam (H) 02/12/2015    Dr Attila Roper     Emphysema of lung (H)      Hypertension      Peptic ulcer disease with hemorrhage      TIA (transient ischaemic attack)      Type II or unspecified type diabetes mellitus without mention of complication, not stated as uncontrolled      PAST SURGICAL HISTORY:   Past Surgical History:   Procedure Laterality Date     AMPUTATION      PARTIAL LEFT INDEX FINGERTIP     APPENDECTOMY       COLONOSCOPY  11/15/2010    COMBINED COLONOSCOPY, SINGLE BIOPSY/POLYPECTOMY BY BIOPSY performed by JUDSON TOWNSEND at  GI     COLONOSCOPY  11/15/2010    COMBINED COLONOSCOPY, REMOVE TUMOR/POLYP/LESION BY SNARE  performed by JUDSON TOWNSEND at  GI     ESOPHAGOSCOPY, GASTROSCOPY, DUODENOSCOPY (EGD), COMBINED  10/28/2010    COMBINED ESOPHAGOSCOPY, GASTROSCOPY, DUODENOSCOPY (EGD) performed by MORE ROBISON at  OR     HC REMV CATARACT EXTRACAP,INSERT LENS  07/20/06    right eye     HC REMV CATARACT EXTRACAP,INSERT LENS  8/17/2006    left eye     HEAD & NECK SURGERY      CERVICAL SPINE     ORTHOPEDIC SURGERY      CERVICAL FUSION     MEDICATIONS:   Current Outpatient Prescriptions:      ACCU-CHEK BAL PLUS test strip, USE TO TEST BLOOD SUGARS 1-3 TIMES DAILY AS DIRECTED., Disp: 100 each, Rfl: 3     amoxicillin-clavulanate (AUGMENTIN) 875-125 MG per tablet, Take 1 tablet by mouth 2 times daily for 7 days, Disp: 14 tablet, Rfl: 0     ASPIRIN NOT PRESCRIBED, INTENTIONAL,, 1 each continuous prn for other Antiplatelet medication not prescribed intentionally due to Uncontrolled hypertension (systolic > 180, diastolic > 100) and Current thienopryridine therapy, Disp: 0 each, Rfl: 0     clorazepate (TRANXENE) 3.75 MG tablet, Take 1 tablet (3.75 mg) by mouth nightly as needed, Disp: 30 tablet, Rfl: 5     gabapentin (NEURONTIN) 300 MG capsule, Take 1 capsule (300 mg) by mouth 2 times daily as needed, Disp: 90 capsule, Rfl: 1     glipiZIDE (GLUCOTROL) 10 MG tablet, Take 1 tablet by mouth 2 times daily (before meals)., Disp: 180 tablet, Rfl: 1     hydrochlorothiazide (HYDRODIURIL) 25 MG tablet, Take 1 tablet (25 mg) by mouth daily, Disp: 90 tablet, Rfl: 1     HYDROcodone-acetaminophen (NORCO) 5-325 MG per tablet, Take 1 tablet by mouth every 4 hours as needed for pain, Disp: 18 tablet, Rfl: 0     LORazepam (ATIVAN) 0.5 MG tablet, Take 1 tablet (0.5 mg) by mouth nightly as needed for anxiety, Disp: 20 tablet, Rfl: 0     losartan (COZAAR) 100 MG tablet, Take 0.5 tablets (50 mg) by mouth 2 times daily, Disp: 30 tablet, Rfl: 1     Olodaterol HCl 2.5 MCG/ACT AERS, Inhale 1 puff into the lungs, Disp: , Rfl:      pantoprazole  "(PROTONIX) 40 MG EC tablet, TAKE ONE TABLET BY MOUTH ONCE DAILY, Disp: 90 tablet, Rfl: 2     PLAVIX 75 MG OR TABS, 1 TABLET DAILY, Disp: , Rfl:      STATIN NOT PRESCRIBED, INTENTIONAL,, Statin not prescribed intentionally due to Intolerance, Disp: 0 each, Rfl: 0  ALLERGIES:    Allergies   Allergen Reactions     No Known Allergies      SOCIAL HISTORY:   Social History     Social History     Marital status:      Spouse name: N/A     Number of children: N/A     Years of education: N/A     Occupational History     Not on file.     Social History Main Topics     Smoking status: Current Some Day Smoker     Packs/day: 0.25     Years: 60.00     Types: Cigarettes     Smokeless tobacco: Never Used     Alcohol use No     Drug use: No     Sexual activity: No     Other Topics Concern     Parent/Sibling W/ Cabg, Mi Or Angioplasty Before 65f 55m? No     Social History Narrative     FAMILY HISTORY:   Family History   Problem Relation Age of Onset     Diabetes Mother      Cancer Sister      brain     EXAM:Vitals: /74  Temp 97.8  F (36.6  C) (Temporal)  Ht 5' 11\" (1.803 m)  Wt 211 lb (95.7 kg)  BMI 29.43 kg/m2  BMI= Body mass index is 29.43 kg/(m^2).    General appearance: Patient is alert and fully cooperative with history & exam.  No sign of distress is noted during the visit.     Psychiatric: Affect is pleasant & appropriate.  Patient appears motivated to improve health.     Respiratory: Breathing is regular & unlabored while sitting.     HEENT: Hearing is intact to spoken word.  Speech is clear.  No gross evidence of visual impairment that would impact ambulation.     Vascular: DP 0/4 & PT 0/4 left & right.  CFT delayed with dependent rubor noted about the digits.  Diminished hair growth distal to mid tibia and no hair about the foot and toes.  Temperature changes noted, warm to cool proximal to distal.  Hemosiderin pigmentation noted with multiple varicosities legs and feet bilateral. Generalized edema bilateral " legs and feet.  Pt denies claudication history.    Dependent rubor noted on the right foot but not the left     Neurologic: Normal plantar response bilateral.  Loss of protective threshold plus 2/10 applications of a 5.07 monofilament.  Pt admits burning and paraesthesias about the feet and toes with palpation.     Dermatologic: All 10 nails are thickened, elongated, discolored and painful with palpation and debridement.  Diminished texture turgor and tone about the integument.  Skin is thin & shiny.  No Pre ulcerative hyperkeratosis noted.  No abscess or full thickness ulcerations noted.     Musculoskeletal: Patient is ambulatory without assistive device or brace.  There is semi reducible contracture of the lesser digits.    Labs:  2014 ABIs of 0.83-0.87, biphasic digital waveforms.      Hemoglobin A1C (%)   Date Value   04/09/2018 6.7 (H)   10/30/2017 6.3 (H)   04/20/2017 6.7 (H)   10/14/2016 6.6 (H)   04/20/2016 6.8   12/02/2015 6.8 (H)   09/10/2015 6.9 (H)   12/03/2014 7.4 (H)     Creatinine (mg/dL)   Date Value   04/09/2018 2.3 (H)   10/30/2017 2.51 (H)   04/20/2017 2.2 (H)   10/14/2016 2.47 (H)   04/20/2016 2.3   12/02/2015 2.61 (H)       ASSESSMENT:       ICD-10-CM    1. Peripheral vascular disease (H) I73.9 US AKTE Doppler with Exercise Bilateral     DEBRIDEMENT OF NAILS, 6 OR MORE   2. Ulcer of right heel and midfoot, limited to breakdown of skin (H) L97.411 US KATE Doppler with Exercise Bilateral     DEBRIDEMENT OF NAILS, 6 OR MORE   3. 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 Type 2 diabetes mellitus with diabetic neuropathy, without long-term current use of insulin (H) E11.40         PLAN:    5/7/2018  All 10 nails were debrided with a nail nipper.   We discussed risk factors and preventive measures.    We discussed appropriate hygiene, shoe gear, daily foot exam, and reinforced management of weight, diet, activity goals and HA1C goal for diabetic patients.    Dispensed written foot care instructions.    All questions  were answered to their satisfaction.    RTC once yearly or as needed with questions or concerns.      He obtains diabetic shoes elsewhere and does not wear them.  We reviewed risks associated with shoe gear and recommended follow-up once yearly for diabetic foot exam and evaluation of shoe gear.  Recommend he utilize his diabetic shoes.  We discussed loss of protective threshold and discussed how to obtain interdigital bathing more easily.  All questions were answered in regards to this.    Recommended noninvasive vascular exams for better baseline evaluation of arterial inflow however the patient refuses today.  In 2014 patient had biphasic waveforms ABIs of 0.83-0.87.  He understands there are risks associated with this and that it should be evaluated and monitored.  We will continue to monitor this upon future visits.    8/13/2018  Dependent rubor noted about the digits of the right foot but not on the left  He started Augmentin from ED 8/9/18 and pt notices no change  This appears consistent with arterial insufficiency.   Order ABIs.  Last done 2014.  Follow-up after obtaining the ABIs.  If these are significantly abnormal I would recommend a vascular consult.     They also requested nail debridment.  I debrided all 10 nails manually and mechanically  Written instructions dispensed.       Ernie Lopez DPM

## 2018-08-16 ENCOUNTER — HOSPITAL ENCOUNTER (OUTPATIENT)
Dept: ULTRASOUND IMAGING | Facility: CLINIC | Age: 83
Discharge: HOME | End: 2018-08-16
Attending: PODIATRIST | Admitting: PODIATRIST
Payer: MEDICARE

## 2018-08-16 DIAGNOSIS — L97.411 ULCER OF RIGHT HEEL AND MIDFOOT, LIMITED TO BREAKDOWN OF SKIN (H): ICD-10-CM

## 2018-08-16 DIAGNOSIS — I73.9 PERIPHERAL VASCULAR DISEASE (H): ICD-10-CM

## 2018-08-16 PROCEDURE — 93922 UPR/L XTREMITY ART 2 LEVELS: CPT

## 2018-08-16 NOTE — PROGRESS NOTES
Appropriate assistive devices provided during their visit. Yes, wc    Exam table and/or cart  placed in the lowest position.Yes    Brakes on tables/carts/wheelchairs used at all times. Yes    Non slip footwear applied. NA    Patient was accompanied by staff throughout visit.Yes    Equipment safety straps used.  N/A    Assist with toileting.N/A

## 2018-08-23 ENCOUNTER — OFFICE VISIT (OUTPATIENT)
Dept: PODIATRY | Facility: CLINIC | Age: 83
End: 2018-08-23
Payer: COMMERCIAL

## 2018-08-23 VITALS
WEIGHT: 211 LBS | SYSTOLIC BLOOD PRESSURE: 180 MMHG | DIASTOLIC BLOOD PRESSURE: 86 MMHG | HEIGHT: 71 IN | BODY MASS INDEX: 29.54 KG/M2 | TEMPERATURE: 97.2 F

## 2018-08-23 DIAGNOSIS — I73.9 PERIPHERAL VASCULAR DISEASE (H): Primary | ICD-10-CM

## 2018-08-23 DIAGNOSIS — E11.21 TYPE 2 DIABETES MELLITUS WITH DIABETIC NEPHROPATHY, WITHOUT LONG-TERM CURRENT USE OF INSULIN (H): ICD-10-CM

## 2018-08-23 DIAGNOSIS — L97.411 ULCER OF RIGHT HEEL AND MIDFOOT, LIMITED TO BREAKDOWN OF SKIN (H): ICD-10-CM

## 2018-08-23 PROCEDURE — 99213 OFFICE O/P EST LOW 20 MIN: CPT | Performed by: PODIATRIST

## 2018-08-23 ASSESSMENT — PAIN SCALES - GENERAL: PAINLEVEL: MODERATE PAIN (5)

## 2018-08-23 NOTE — PROGRESS NOTES
Chief Complaint   Patient presents with     Results     KATE 8/16/2018     WOUND CARE     pain 5, drainage, didn't finish amoxicillin from ED - DM ulcer Right heel, onset early Aug 2018; LOV 8/13/2018     Diabetes     Type 2     BMI is normal.  _________________________________________    8/13/2018 - NEW ISSUE TODAY - 8/9/2018 - ED follow up  HPI:  Aston Linn is a 89 year old male who is seen in consultation at the request of ED Dept - Michael MD Miguel.    Pt presents for eval of:   (Onset, Location, L/R, Character, Treatments, Injury if yes)    XR Right foot 8/9/2018    1. DM Type 2     2. Onset early Aug 2018, DM ulcer posterior Right heel. No injury noted.  Constant, sharp, stabbing, burning, numbness, tingling, redness, pain 9.    BMI is normal.    Patient to follow up with Primary Care provider regarding elevated blood pressure.  _______________________________    Review of Systems:  Patient denies fever, chills, rash, wound, stiffness, limping, numbness, weakness, heart burn, blood in stool, chest pain with activity, calf pain when walking, shortness of breath with activity, chronic cough, easy bleeding/bruising, swelling of ankles, excessive thirst, fatigue, depression, anxiety.  Patient admits only to symptoms noted in history.     PAST MEDICAL HISTORY:   Past Medical History:   Diagnosis Date     Aortic aneurysm (H)      Blood transfusion      Carotid artery stenosis     80% lesion on left     CKD (chronic kidney disease) stage 3, GFR 30-59 ml/min      COPD (chronic obstructive pulmonary disease) (H)      Diabetic eye exam (H) 03/29/11     Diabetic eye exam (H) 02/12/2015    Dr Attila Roper     Emphysema of lung (H)      Hypertension      Peptic ulcer disease with hemorrhage      TIA (transient ischaemic attack)      Type II or unspecified type diabetes mellitus without mention of complication, not stated as uncontrolled      PAST SURGICAL HISTORY:   Past Surgical History:   Procedure Laterality Date      AMPUTATION      PARTIAL LEFT INDEX FINGERTIP     APPENDECTOMY       COLONOSCOPY  11/15/2010    COMBINED COLONOSCOPY, SINGLE BIOPSY/POLYPECTOMY BY BIOPSY performed by JUDSON TOWNSEND at  GI     COLONOSCOPY  11/15/2010    COMBINED COLONOSCOPY, REMOVE TUMOR/POLYP/LESION BY SNARE performed by JUDSON TOWNSEND at  GI     ESOPHAGOSCOPY, GASTROSCOPY, DUODENOSCOPY (EGD), COMBINED  10/28/2010    COMBINED ESOPHAGOSCOPY, GASTROSCOPY, DUODENOSCOPY (EGD) performed by MORE ROBISON at  OR     HC REMV CATARACT EXTRACAP,INSERT LENS  07/20/06    right eye     HC REMV CATARACT EXTRACAP,INSERT LENS  8/17/2006    left eye     HEAD & NECK SURGERY      CERVICAL SPINE     ORTHOPEDIC SURGERY      CERVICAL FUSION     MEDICATIONS:   Current Outpatient Prescriptions:      ACCU-CHEK BAL PLUS test strip, USE TO TEST BLOOD SUGARS 1-3 TIMES DAILY AS DIRECTED., Disp: 100 each, Rfl: 3     ASPIRIN NOT PRESCRIBED, INTENTIONAL,, 1 each continuous prn for other Antiplatelet medication not prescribed intentionally due to Uncontrolled hypertension (systolic > 180, diastolic > 100) and Current thienopryridine therapy, Disp: 0 each, Rfl: 0     clorazepate (TRANXENE) 3.75 MG tablet, Take 1 tablet (3.75 mg) by mouth nightly as needed, Disp: 30 tablet, Rfl: 5     gabapentin (NEURONTIN) 300 MG capsule, Take 1 capsule (300 mg) by mouth 2 times daily as needed, Disp: 90 capsule, Rfl: 1     glipiZIDE (GLUCOTROL) 10 MG tablet, Take 1 tablet by mouth 2 times daily (before meals)., Disp: 180 tablet, Rfl: 1     hydrochlorothiazide (HYDRODIURIL) 25 MG tablet, Take 1 tablet (25 mg) by mouth daily, Disp: 90 tablet, Rfl: 1     HYDROcodone-acetaminophen (NORCO) 5-325 MG per tablet, Take 1 tablet by mouth every 4 hours as needed for pain, Disp: 18 tablet, Rfl: 0     LORazepam (ATIVAN) 0.5 MG tablet, Take 1 tablet (0.5 mg) by mouth nightly as needed for anxiety, Disp: 20 tablet, Rfl: 0     losartan (COZAAR) 100 MG tablet, Take 0.5 tablets (50 mg) by  "mouth 2 times daily, Disp: 30 tablet, Rfl: 1     Olodaterol HCl 2.5 MCG/ACT AERS, Inhale 1 puff into the lungs, Disp: , Rfl:      pantoprazole (PROTONIX) 40 MG EC tablet, TAKE ONE TABLET BY MOUTH ONCE DAILY, Disp: 90 tablet, Rfl: 2     PLAVIX 75 MG OR TABS, 1 TABLET DAILY, Disp: , Rfl:      STATIN NOT PRESCRIBED, INTENTIONAL,, Statin not prescribed intentionally due to Intolerance, Disp: 0 each, Rfl: 0  ALLERGIES:    Allergies   Allergen Reactions     No Known Allergies      SOCIAL HISTORY:   Social History     Social History     Marital status:      Spouse name: N/A     Number of children: N/A     Years of education: N/A     Occupational History     Not on file.     Social History Main Topics     Smoking status: Current Some Day Smoker     Packs/day: 0.25     Years: 60.00     Types: Cigarettes     Smokeless tobacco: Never Used     Alcohol use No     Drug use: No     Sexual activity: No     Other Topics Concern     Parent/Sibling W/ Cabg, Mi Or Angioplasty Before 65f 55m? No     Social History Narrative     FAMILY HISTORY:   Family History   Problem Relation Age of Onset     Diabetes Mother      Cancer Sister      brain     EXAM:Vitals: /86 (BP Location: Left arm, Cuff Size: Adult Regular)  Temp 97.2  F (36.2  C) (Temporal)  Ht 5' 11\" (1.803 m)  Wt 211 lb (95.7 kg)  BMI 29.43 kg/m2  BMI= Body mass index is 29.43 kg/(m^2).    General appearance: Patient is alert and fully cooperative with history & exam.  No sign of distress is noted during the visit.     Psychiatric: Affect is pleasant & appropriate.  Patient appears motivated to improve health.     Respiratory: Breathing is regular & unlabored while sitting.     HEENT: Hearing is intact to spoken word.  Speech is clear.  No gross evidence of visual impairment that would impact ambulation.     Vascular: DP 0/4 & PT 0/4 left & right.  CFT delayed with dependent rubor noted about the digits.  Diminished hair growth distal to mid tibia and no hair " about the foot and toes.  Temperature changes noted, warm to cool proximal to distal.  Hemosiderin pigmentation noted with multiple varicosities legs and feet bilateral. Generalized edema bilateral legs and feet.  Pt denies claudication history.    Dependent rubor noted on the right foot but not the left     Neurologic: Normal plantar response bilateral.  Loss of protective threshold plus 2/10 applications of a 5.07 monofilament.  Pt admits burning and paraesthesias about the feet and toes with palpation.     Dermatologic: Diminished texture turgor and tone about the integument.  Skin is thin & shiny.  There is a full-thickness ulceration about the plantar distal aspect of the right calcaneus measuring about 1 cm.  It is exquisitely painful.  No abscess or erythema.     Musculoskeletal: Patient is ambulatory without assistive device or brace.  There is semi reducible contracture of the lesser digits.    Labs:  2014 ABIs of 0.83-0.87, biphasic digital waveforms.      Hemoglobin A1C (%)   Date Value   04/09/2018 6.7 (H)   10/30/2017 6.3 (H)   04/20/2017 6.7 (H)   10/14/2016 6.6 (H)   04/20/2016 6.8   12/02/2015 6.8 (H)   09/10/2015 6.9 (H)   12/03/2014 7.4 (H)     Creatinine (mg/dL)   Date Value   04/09/2018 2.3 (H)   10/30/2017 2.51 (H)   04/20/2017 2.2 (H)   10/14/2016 2.47 (H)   04/20/2016 2.3   12/02/2015 2.61 (H)       ASSESSMENT:       ICD-10-CM    1. Peripheral vascular disease (H) I73.9    2. Ulcer of right heel and midfoot, limited to breakdown of skin (H) L97.411    3. Type 2 diabetes mellitus with diabetic nephropathy, without long-term current use of insulin (H) E11.21         PLAN:    5/7/2018  All 10 nails were debrided with a nail nipper.   We discussed risk factors and preventive measures.    We discussed appropriate hygiene, shoe gear, daily foot exam, and reinforced management of weight, diet, activity goals and HA1C goal for diabetic patients.    Dispensed written foot care instructions.    All  questions were answered to their satisfaction.    RTC once yearly or as needed with questions or concerns.      He obtains diabetic shoes elsewhere and does not wear them.  We reviewed risks associated with shoe gear and recommended follow-up once yearly for diabetic foot exam and evaluation of shoe gear.  Recommend he utilize his diabetic shoes.  We discussed loss of protective threshold and discussed how to obtain interdigital bathing more easily.  All questions were answered in regards to this.    Recommended noninvasive vascular exams for better baseline evaluation of arterial inflow however the patient refuses today.  In 2014 patient had biphasic waveforms ABIs of 0.83-0.87.  He understands there are risks associated with this and that it should be evaluated and monitored.  We will continue to monitor this upon future visits.    8/13/2018  Dependent rubor noted about the digits of the right foot but not on the left  He started Augmentin from ED 8/9/18 and pt notices no change  This appears consistent with arterial insufficiency.   Order ABIs.  Last done 2014.  Follow-up after obtaining the ABIs.  If these are significantly abnormal I would recommend a vascular consult.     They also requested nail debridment.  I debrided all 10 nails manually and mechanically  Written instructions dispensed.     8/23/2018  recommend a vascular consultation with dry stable ulcer about the right calcaneus and ABIs demonstrating worsening and severe arterial insufficiency.    We discussed alternative treatment options and at this time he would like to move forward with further discovering his arterial inflow.  Follow-up with me as needed or after vascular consult      Ernie Lopez DPM

## 2018-08-23 NOTE — MR AVS SNAPSHOT
After Visit Summary   8/23/2018    Aston Linn    MRN: [id number]           Patient Information     Date Of Birth          9/21/1926        Visit Information        Provider Department      8/23/2018 10:15 AM Ernie Lopez DPM [facility]        Today's Diagnoses     Peripheral vascular disease (H)    -  1    Ulcer of right heel and midfoot, limited to breakdown of skin (H)        Type 2 diabetes mellitus with diabetic nephropathy, without long-term current use of insulin (H)          Care Instructions    Follow-up in vascular surgery          Follow-ups after your visit        Additional Services     VASCULAR SURGERY REFERRAL       Your provider has referred you to: [facility]    Please be aware that coverage of these services is subject to the terms and limitations of your health insurance plan.  Call member services at your health plan with any benefit or coverage questions.      Please bring the following with you to your appointment:    (1) Any X-Rays, CTs or MRIs which have been performed.  Contact the facility where they were done to arrange for  prior to your scheduled appointment.    (2) List of current medications   (3) This referral request   (4) Any documents/labs given to you for this referral                  Who to contact     If you have questions or need follow up information about today's clinic visit or your schedule please contact [facility] directly at [phone].  Normal or non-critical lab and imaging results will be communicated to you by MyChart, letter or phone within 4 business days after the clinic has received the results. If you do not hear from us within 7 days, please contact the clinic through MyChart or phone. If you have a critical or abnormal lab result, we will notify you by phone as soon as possible.  Submit refill requests through [employer] or call your pharmacy and they will forward the refill request to  "us. Please allow 3 business days for your refill to be completed.          Additional Information About Your Visit        Care EveryWhere ID     This is your Care EveryWhere ID. This could be used by other organizations to access your [place] medical records  [id number]        Your Vitals Were     Temperature Height BMI (Body Mass Index)             97.2  F (36.2  C) (Temporal) 5' 11\" (1.803 m) 29.43 kg/m2          Blood Pressure from Last 3 Encounters:   08/23/18 180/86   08/13/18 172/74   08/09/18 (!) 204/84    Weight from Last 3 Encounters:   08/23/18 211 lb (95.7 kg)   08/13/18 211 lb (95.7 kg)   05/07/18 211 lb (95.7 kg)              We Performed the Following     VASCULAR SURGERY REFERRAL        Primary Care Provider Office Phone # Fax #    Corky Jamel Yang [phone] [address] [place] [address]        Equal Access to Services     [facility]: Hadii aad ku hadasho Soomaali, waaxda luqadaha, qaybta kaalmada adeegyada, waxay idiin hayzoilan desiree ramírez . So [facility] [phone].    ATENCIÓN: Si habla español, tiene a martinez disposición servicios gratuitos de asistencia lingüística. [facility] [phone].    We comply with applicable federal civil rights laws and [place] laws. We do not discriminate on the basis of race, color, national origin, age, disability, sex, sexual orientation, or gender identity.            Thank you!     Thank you for choosing [facility]  for your care. Our goal is always to provide you with excellent care. Hearing back from our patients is one way we can continue to improve our services. Please take a few minutes to complete the written survey that you may receive in the mail after your visit with us. Thank you!             Your Updated Medication List - Protect others around you: Learn how to safely use, store and throw away your medicines at www.disposemymeds.org.          This list is accurate as of 8/23/18  5:15 PM.  " Always use your most recent med list.                   Brand Name Dispense Instructions for use Diagnosis    ACCU-CHEK BAL PLUS test strip   Generic drug:  blood glucose monitoring     100 each    USE TO TEST BLOOD SUGARS 1-3 TIMES DAILY AS DIRECTED.    Diabetic polyneuropathy associated with type 2 diabetes mellitus (H)       ASPIRIN NOT PRESCRIBED    INTENTIONAL    0 each    1 each continuous prn for other Antiplatelet medication not prescribed intentionally due to Uncontrolled hypertension (systolic > 180, diastolic > 100) and Current thienopryridine therapy    Diabetic polyneuropathy associated with type 2 diabetes mellitus (H)       clorazepate 3.75 MG tablet    TRANXENE-T    30 tablet    Take 1 tablet (3.75 mg) by mouth nightly as needed    Psychophysiological insomnia       gabapentin 300 MG capsule    NEURONTIN    90 capsule    Take 1 capsule (300 mg) by mouth 2 times daily as needed    Diabetic polyneuropathy associated with type 2 diabetes mellitus (H)       glipiZIDE 10 MG tablet    GLUCOTROL    180 tablet    Take 1 tablet by mouth 2 times daily (before meals).        hydrochlorothiazide 25 MG tablet    HYDRODIURIL    90 tablet    Take 1 tablet (25 mg) by mouth daily    CKD (chronic kidney disease) stage 3, GFR 30-59 ml/min, Hypertension goal BP (blood pressure) < 130/80       HYDROcodone-acetaminophen 5-325 MG per tablet    NORCO    18 tablet    Take 1 tablet by mouth every 4 hours as needed for pain        LORazepam 0.5 MG tablet    ATIVAN    20 tablet    Take 1 tablet (0.5 mg) by mouth nightly as needed for anxiety    Anxiety state, unspecified       losartan 100 MG tablet    COZAAR    30 tablet    Take 0.5 tablets (50 mg) by mouth 2 times daily    Type 2 diabetes, HbA1C goal < 8% (H), Hypertension, goal below 140/90       Olodaterol HCl 2.5 MCG/ACT Aers      Inhale 1 puff into the lungs        pantoprazole 40 MG EC tablet    PROTONIX    90 tablet    TAKE ONE TABLET BY MOUTH ONCE DAILY     Gastroesophageal reflux disease without esophagitis       PLAVIX 75 MG tablet   Generic drug:  clopidogrel      1 TABLET DAILY        STATIN NOT PRESCRIBED (INTENTIONAL)     0 each    Statin not prescribed intentionally due to Intolerance    Type 2 diabetes, HbA1C goal < 8% (H)

## 2018-08-23 NOTE — LETTER
8/23/2018         RE: Aston Linn  [address] [facility] [address]        Dear Colleague,    Thank you for referring your patient, Aston Linn, to the [facility]. Please see a copy of my visit note below.    Chief Complaint   Patient presents with     Results     KATE 8/16/2018     WOUND CARE     pain 5, drainage, didn't finish amoxicillin from ED - DM ulcer Right heel, onset early Aug 2018; LOV 8/13/2018     Diabetes     Type 2     BMI is normal.  _________________________________________    8/13/2018 - NEW ISSUE TODAY - 8/9/2018 - ED follow up  HPI:  Aston Linn is a 89 year old male who is seen in consultation at the request of ED Dept - Michael MD Miguel.    Pt presents for eval of:   (Onset, Location, L/R, Character, Treatments, Injury if yes)    XR Right foot 8/9/2018    1. DM Type 2     2. Onset early Aug 2018, DM ulcer posterior Right heel. No injury noted.  Constant, sharp, stabbing, burning, numbness, tingling, redness, pain 9.    BMI is normal.    Patient to follow up with Primary Care provider regarding elevated blood pressure.  _______________________________    Review of Systems:  Patient denies fever, chills, rash, wound, stiffness, limping, numbness, weakness, heart burn, blood in stool, chest pain with activity, calf pain when walking, shortness of breath with activity, chronic cough, easy bleeding/bruising, swelling of ankles, excessive thirst, fatigue, depression, anxiety.  Patient admits only to symptoms noted in history.     PAST MEDICAL HISTORY:   Past Medical History:   Diagnosis Date     Aortic aneurysm (H)      Blood transfusion      Carotid artery stenosis     80% lesion on left     CKD (chronic kidney disease) stage 3, GFR 30-59 ml/min      COPD (chronic obstructive pulmonary disease) (H)      Diabetic eye exam (H) 03/29/11     Diabetic eye exam (H) 02/12/2015    Dr Attila Roper     Emphysema of lung (H)      Hypertension      Peptic ulcer  disease with hemorrhage      TIA (transient ischaemic attack)      Type II or unspecified type diabetes mellitus without mention of complication, not stated as uncontrolled      PAST SURGICAL HISTORY:   Past Surgical History:   Procedure Laterality Date     AMPUTATION      PARTIAL LEFT INDEX FINGERTIP     APPENDECTOMY       COLONOSCOPY  11/15/2010    COMBINED COLONOSCOPY, SINGLE BIOPSY/POLYPECTOMY BY BIOPSY performed by JUDSON TOWNSEND at  GI     COLONOSCOPY  11/15/2010    COMBINED COLONOSCOPY, REMOVE TUMOR/POLYP/LESION BY SNARE performed by JUDSON TOWNSEND at  GI     ESOPHAGOSCOPY, GASTROSCOPY, DUODENOSCOPY (EGD), COMBINED  10/28/2010    COMBINED ESOPHAGOSCOPY, GASTROSCOPY, DUODENOSCOPY (EGD) performed by MORE ROBISON at  OR      REMV CATARACT EXTRACAP,INSERT LENS  07/20/06    right eye     HC REMV CATARACT EXTRACAP,INSERT LENS  8/17/2006    left eye     HEAD & NECK SURGERY      CERVICAL SPINE     ORTHOPEDIC SURGERY      CERVICAL FUSION     MEDICATIONS:   Current Outpatient Prescriptions:      ACCU-CHEK BAL PLUS test strip, USE TO TEST BLOOD SUGARS 1-3 TIMES DAILY AS DIRECTED., Disp: 100 each, Rfl: 3     ASPIRIN NOT PRESCRIBED, INTENTIONAL,, 1 each continuous prn for other Antiplatelet medication not prescribed intentionally due to Uncontrolled hypertension (systolic > 180, diastolic > 100) and Current thienopryridine therapy, Disp: 0 each, Rfl: 0     clorazepate (TRANXENE) 3.75 MG tablet, Take 1 tablet (3.75 mg) by mouth nightly as needed, Disp: 30 tablet, Rfl: 5     gabapentin (NEURONTIN) 300 MG capsule, Take 1 capsule (300 mg) by mouth 2 times daily as needed, Disp: 90 capsule, Rfl: 1     glipiZIDE (GLUCOTROL) 10 MG tablet, Take 1 tablet by mouth 2 times daily (before meals)., Disp: 180 tablet, Rfl: 1     hydrochlorothiazide (HYDRODIURIL) 25 MG tablet, Take 1 tablet (25 mg) by mouth daily, Disp: 90 tablet, Rfl: 1     HYDROcodone-acetaminophen (NORCO) 5-325 MG per tablet, Take 1 tablet by  "mouth every 4 hours as needed for pain, Disp: 18 tablet, Rfl: 0     LORazepam (ATIVAN) 0.5 MG tablet, Take 1 tablet (0.5 mg) by mouth nightly as needed for anxiety, Disp: 20 tablet, Rfl: 0     losartan (COZAAR) 100 MG tablet, Take 0.5 tablets (50 mg) by mouth 2 times daily, Disp: 30 tablet, Rfl: 1     Olodaterol HCl 2.5 MCG/ACT AERS, Inhale 1 puff into the lungs, Disp: , Rfl:      pantoprazole (PROTONIX) 40 MG EC tablet, TAKE ONE TABLET BY MOUTH ONCE DAILY, Disp: 90 tablet, Rfl: 2     PLAVIX 75 MG OR TABS, 1 TABLET DAILY, Disp: , Rfl:      STATIN NOT PRESCRIBED, INTENTIONAL,, Statin not prescribed intentionally due to Intolerance, Disp: 0 each, Rfl: 0  ALLERGIES:    Allergies   Allergen Reactions     No Known Allergies      SOCIAL HISTORY:   Social History     Social History     Marital status:      Spouse name: N/A     Number of children: N/A     Years of education: N/A     Occupational History     Not on file.     Social History Main Topics     Smoking status: Current Some Day Smoker     Packs/day: 0.25     Years: 60.00     Types: Cigarettes     Smokeless tobacco: Never Used     Alcohol use No     Drug use: No     Sexual activity: No     Other Topics Concern     Parent/Sibling W/ Cabg, Mi Or Angioplasty Before 65f 55m? No     Social History Narrative     FAMILY HISTORY:   Family History   Problem Relation Age of Onset     Diabetes Mother      Cancer Sister      brain     EXAM:Vitals: /86 (BP Location: Left arm, Cuff Size: Adult Regular)  Temp 97.2  F (36.2  C) (Temporal)  Ht 5' 11\" (1.803 m)  Wt 211 lb (95.7 kg)  BMI 29.43 kg/m2  BMI= Body mass index is 29.43 kg/(m^2).    General appearance: Patient is alert and fully cooperative with history & exam.  No sign of distress is noted during the visit.     Psychiatric: Affect is pleasant & appropriate.  Patient appears motivated to improve health.     Respiratory: Breathing is regular & unlabored while sitting.     HEENT: Hearing is intact to spoken " word.  Speech is clear.  No gross evidence of visual impairment that would impact ambulation.     Vascular: DP 0/4 & PT 0/4 left & right.  CFT delayed with dependent rubor noted about the digits.  Diminished hair growth distal to mid tibia and no hair about the foot and toes.  Temperature changes noted, warm to cool proximal to distal.  Hemosiderin pigmentation noted with multiple varicosities legs and feet bilateral. Generalized edema bilateral legs and feet.  Pt denies claudication history.    Dependent rubor noted on the right foot but not the left     Neurologic: Normal plantar response bilateral.  Loss of protective threshold plus 2/10 applications of a 5.07 monofilament.  Pt admits burning and paraesthesias about the feet and toes with palpation.     Dermatologic: Diminished texture turgor and tone about the integument.  Skin is thin & shiny.  There is a full-thickness ulceration about the plantar distal aspect of the right calcaneus measuring about 1 cm.  It is exquisitely painful.  No abscess or erythema.     Musculoskeletal: Patient is ambulatory without assistive device or brace.  There is semi reducible contracture of the lesser digits.    Labs:  2014 ABIs of 0.83-0.87, biphasic digital waveforms.      Hemoglobin A1C (%)   Date Value   04/09/2018 6.7 (H)   10/30/2017 6.3 (H)   04/20/2017 6.7 (H)   10/14/2016 6.6 (H)   04/20/2016 6.8   12/02/2015 6.8 (H)   09/10/2015 6.9 (H)   12/03/2014 7.4 (H)     Creatinine (mg/dL)   Date Value   04/09/2018 2.3 (H)   10/30/2017 2.51 (H)   04/20/2017 2.2 (H)   10/14/2016 2.47 (H)   04/20/2016 2.3   12/02/2015 2.61 (H)       ASSESSMENT:       ICD-10-CM    1. Peripheral vascular disease (H) I73.9    2. Ulcer of right heel and midfoot, limited to breakdown of skin (H) L97.411    3. Type 2 diabetes mellitus with diabetic nephropathy, without long-term current use of insulin (H) E11.21         PLAN:    5/7/2018  All 10 nails were debrided with a nail nipper.   We discussed  risk factors and preventive measures.    We discussed appropriate hygiene, shoe gear, daily foot exam, and reinforced management of weight, diet, activity goals and HA1C goal for diabetic patients.    Dispensed written foot care instructions.    All questions were answered to their satisfaction.    RTC once yearly or as needed with questions or concerns.      He obtains diabetic shoes elsewhere and does not wear them.  We reviewed risks associated with shoe gear and recommended follow-up once yearly for diabetic foot exam and evaluation of shoe gear.  Recommend he utilize his diabetic shoes.  We discussed loss of protective threshold and discussed how to obtain interdigital bathing more easily.  All questions were answered in regards to this.    Recommended noninvasive vascular exams for better baseline evaluation of arterial inflow however the patient refuses today.  In 2014 patient had biphasic waveforms ABIs of 0.83-0.87.  He understands there are risks associated with this and that it should be evaluated and monitored.  We will continue to monitor this upon future visits.    8/13/2018  Dependent rubor noted about the digits of the right foot but not on the left  He started Augmentin from ED 8/9/18 and pt notices no change  This appears consistent with arterial insufficiency.   Order ABIs.  Last done 2014.  Follow-up after obtaining the ABIs.  If these are significantly abnormal I would recommend a vascular consult.     They also requested nail debridment.  I debrided all 10 nails manually and mechanically  Written instructions dispensed.     8/23/2018  recommend a vascular consultation with dry stable ulcer about the right calcaneus and ABIs demonstrating worsening and severe arterial insufficiency.    We discussed alternative treatment options and at this time he would like to move forward with further discovering his arterial inflow.  Follow-up with me as needed or after vascular consult      Ernie Contreras  DAVID Lopez            Again, thank you for allowing me to participate in the care of your patient.        Sincerely,        Ernie Lopez DPM

## 2018-08-24 ENCOUNTER — TELEPHONE (OUTPATIENT)
Dept: OTHER | Facility: CLINIC | Age: 83
End: 2018-08-24

## 2018-08-24 DIAGNOSIS — I65.23 BILATERAL CAROTID ARTERY STENOSIS: Primary | ICD-10-CM

## 2018-08-24 DIAGNOSIS — I71.40 ABDOMINAL AORTIC ANEURYSM (AAA) WITHOUT RUPTURE (H): ICD-10-CM

## 2018-08-24 NOTE — TELEPHONE ENCOUNTER
"Pt referred to [facility] by Ernie Lopez DPM for PAD, DM right heel ulcer.    8/16/18 KATE w/o exercise:   \"Impression:  1.  Resting ABIs compatible with severe arterial insufficiency on the  right, worsened from prior examination.  2.  Resting ABIs compatible with moderate to severe arterial  insufficiency in the left, worsened from prior examination.\"    Epic review shows CT C/A/P completed 4/20/12 reported infrarenal AAA measuring 3.5cm and ascending thoracic aorta measuring 4.3cm.         11/18/15 Bilateral carotid US:  \"2. Over 70% diameter stenosis of the left ICA relative to the distal  ICA diameter. \"     Pt needs to be scheduled for AAA US and bilateral carotid US and consult with Vascular Surgery.  Will route to scheduling to coordinate an appointment ASAP.    ELIER Mcmahon RN    "

## 2018-08-24 NOTE — TELEPHONE ENCOUNTER
Patient has been scheduled for US and consult on 9/14/18 with Dr. Leach. Patient was offered earlier appointments but could not make them due to lack of transportation.

## 2018-09-04 NOTE — PROGRESS NOTES
SUBJECTIVE:   Aston Linn is a 89 year old male who presents to clinic today for the following health issues:         Seeing vascular on 09/14/18 - has poor circulation in his leg, has an ulcer on his heel, which is felt to be secondary to peripheral vascular disease.  Will be seeing vascular at [facility] on September 14.    Reports claudication in both legs after walking just 100 feet or so.    States his sugars are pretty good overall.    HPI  Concern - follow up right heel  Onset: was in ED on 08/09/18    Description:   hurts    Progression of Symptoms:  Little better    Accompanying Signs & Symptoms:  none    Previous history of similar problem:   no    Precipitating factors:   Worsened by: none    Alleviating factors:  Improved by: elevation    Therapies Tried and outcome: seeing Dr. Lopez, elevation  Problem list and histories reviewed & adjusted, as indicated.  Additional history: as documented      Current Outpatient Prescriptions   Medication Sig Dispense Refill     ACCU-CHEK BAL PLUS test strip USE TO TEST BLOOD SUGARS 1-3 TIMES DAILY AS DIRECTED. 100 each 3     ASPIRIN NOT PRESCRIBED, INTENTIONAL, 1 each continuous prn for other Antiplatelet medication not prescribed intentionally due to Uncontrolled hypertension (systolic > 180, diastolic > 100) and Current thienopryridine therapy 0 each 0     clorazepate (TRANXENE) 3.75 MG tablet Take 1 tablet (3.75 mg) by mouth nightly as needed 30 tablet 5     gabapentin (NEURONTIN) 300 MG capsule Take 1 capsule (300 mg) by mouth 2 times daily as needed 90 capsule 1     glipiZIDE (GLUCOTROL) 10 MG tablet Take 1 tablet by mouth 2 times daily (before meals). 180 tablet 1     hydrochlorothiazide (HYDRODIURIL) 25 MG tablet Take 1 tablet (25 mg) by mouth daily 90 tablet 1     HYDROcodone-acetaminophen (NORCO) 5-325 MG per tablet Take 1 tablet by mouth every 4 hours as needed for pain 18 tablet 0     LORazepam (ATIVAN) 0.5 MG tablet Take 1 tablet (0.5 mg) by mouth  nightly as needed for anxiety 20 tablet 0     losartan (COZAAR) 100 MG tablet Take 0.5 tablets (50 mg) by mouth 2 times daily 30 tablet 1     Olodaterol HCl 2.5 MCG/ACT AERS Inhale 1 puff into the lungs       pantoprazole (PROTONIX) 40 MG EC tablet TAKE ONE TABLET BY MOUTH ONCE DAILY 90 tablet 2     PLAVIX 75 MG OR TABS 1 TABLET DAILY       STATIN NOT PRESCRIBED, INTENTIONAL, Statin not prescribed intentionally due to Intolerance 0 each 0     Recent Labs   Lab Test 04/09/18  10/30/17   0948 04/20/17  10/14/16   1044   12/02/15   1124   09/10/15   1215  12/03/14   1131   A1C  6.7*  6.3*  6.7*  6.6*   < >  6.8*   --   6.9*  7.4*   LDL   --   138*   --    --    --   129*   --    --   151*   HDL   --   41   --    --    --   36*   --    --   35*   TRIG   --   174*   --    --    --   172*   --    --   156*   ALT  21  16  22  14   < >  16   < >  15  12   CR  2.3*  2.51*  2.2*  2.47*   < >  2.61*   < >  2.00*  2.25*   GFRESTIMATED  27*  24*  28*  25*   < >  23*   < >  32*  28*   GFRESTBLACK   --   29*   --   30*   --   28*   < >  38*  33*   POTASSIUM  5.2*  5.2  5.2*  5.3   < >  5.3   < >  4.8  5.5*   TSH   --   2.99   --    --    --    --    --   2.51  3.08    < > = values in this interval not displayed.      BP Readings from Last 3 Encounters:   09/06/18 158/80   08/23/18 180/86   08/13/18 172/74    Wt Readings from Last 3 Encounters:   08/23/18 211 lb (95.7 kg)   08/13/18 211 lb (95.7 kg)   05/07/18 211 lb (95.7 kg)                  ROS:  Constitutional, HEENT, cardiovascular, pulmonary, gi and gu systems are negative, except as otherwise noted.    OBJECTIVE:     /80 (BP Location: Left arm, Patient Position: Chair, Cuff Size: Adult Regular)  Pulse 90  Temp 96.5  F (35.8  C) (Temporal)  Resp 16  SpO2 95%  There is no height or weight on file to calculate BMI.  GENERAL: healthy, alert and no distress  NECK: no adenopathy, no asymmetry, masses, or scars and thyroid normal to palpation  RESP: lungs clear to  auscultation - no rales, rhonchi or wheezes  CV: regular rate and rhythm, normal S1 S2, no S3 or S4, no murmur, click or rub, no peripheral edema and peripheral pulses strong  ABDOMEN: soft, nontender, no hepatosplenomegaly, no masses and bowel sounds normal  MS: no gross musculoskeletal defects noted, no edema  SKIN: ulceration on right heel.      Diagnostic Test Results:  Results for orders placed or performed during the hospital encounter of 18   US KATE Doppler No Exercise    Narrative    PROCEDURE:   KATE with Doppler Waveforms of the bilateral lower extremities    DATE OF PROCEDURE:   2018 1:31 PM    CLINICAL HISTORY/INDICATION:  89-year-old male with a right heel ulcer    COMPARISON:  2014    TECHNIQUE:  Resting ankle branchial indices and waveform analysis of the bilateral  lower extremities    FINDINGS:    Right:  Ankle PT  Index: 0.33   mm/H   Wave: Biphasic     Ankle DP  Index: 0.41   mm/H   Wave: Biphasic     Left:  Ankle PT  Index: 0.52   mm/H   Wave: Biphasic     Ankle DP  Index: 0.44   mm/H   Wave: Biphasic       Impression    Impression:  1.  Resting ABIs compatible with severe arterial insufficiency on the  right, worsened from prior examination.  2.  Resting ABIs compatible with moderate to severe arterial  insufficiency in the left, worsened from prior examination.    KATE Diagnostic Criteria      >/= 1.3          Non compressible   0.95 - 1.29     Normal   0.90 - 0.94     Mild PAD   0.50 - 0.89     Moderate PAD   0.20 - 0.49     Severe PAD   < 0.20             Critical PAD    PALLAVI MCLAUGHLIN MD       ASSESSMENT/PLAN:     Tobacco Cessation:   reports that he has been smoking Cigarettes.  He has a 15.00 pack-year smoking history. He has never used smokeless tobacco.  Tobacco Cessation Action Plan: Information offered: Patient not interested at this time        ICD-10-CM    1. Peripheral vascular disease of lower extremity with ulceration (H) I73.9 Hemoglobin A1c     L97.909 Comprehensive metabolic panel     rosuvastatin (CRESTOR) 5 MG tablet   2. PAD (peripheral artery disease) (H) I73.9 Hemoglobin A1c     Comprehensive metabolic panel     rosuvastatin (CRESTOR) 5 MG tablet   3. Type 2 diabetes mellitus with diabetic nephropathy, without long-term current use of insulin (H) E11.21 Hemoglobin A1c     Comprehensive metabolic panel   4. Hypertension goal BP (blood pressure) < 130/80 I10 amLODIPine (NORVASC) 2.5 MG tablet   5. CKD (chronic kidney disease) stage 4, GFR 15-29 ml/min (H) N18.4 amLODIPine (NORVASC) 2.5 MG tablet     rosuvastatin (CRESTOR) 5 MG tablet   6. Hyperlipidemia LDL goal <100 E78.5 rosuvastatin (CRESTOR) 5 MG tablet   7. Chronic obstructive pulmonary disease, unspecified COPD type (H) J44.9 albuterol (PROAIR HFA/PROVENTIL HFA/VENTOLIN HFA) 108 (90 Base) MCG/ACT inhaler     1, 2.  I discussed the nature of his vascular problem.  I discussed various approaches to trying to control the underlying contributors to this.  I very strongly recommended he quit smoking.  Patient has no interest in this.  I strongly recommend a statin medication, but patient is not particularly interested in this.  After some discussion, he was willing to consider trying a different statin he has been on previously.  I also stressed the importance of getting his blood pressure under better control.  Because of this, he is willing to try amlodipine in addition to his other agents.  He has wife were not certain which medications he was actually taking, so I also instructed them to verify his medication list prior to making any significant adjustments.  It may be that he is simply not taking all of his medications that he is supposed to.  He is to follow-up with vascular soon to see if there are any interventions that they can do to help.  I did reiterate to him that even if they can intervene to improve the symptoms, if he does not address the underlying issues this will recur in short  order.  3.  This currently controlled.  Encouraged patient to continue ensuring that his diabetes is under control to help with his vascular disease.  4.  Not controlled.  Will add amlodipine to current regimen and recheck in 1 month.  5.  Likely related to his underlying vascular disease which is causing his peripheral arterial disease, his stroke, and certainly puts him at risk for heart attack.  I encouraged him to start a statin medication, and he is willing to consider starting Crestor at a low dose since he has not been on this previously.  In retrospect, I wonder how much of his leg pain that he associated with statin was actually early stages of his peripheral arterial disease.  Monitor labs at appropriate intervals and follow-up regularly.  6.  Not controlled.  Recommended statin.  Patient will go ahead and try this.  See above.  7.  Stable per patient.  Will continue albuterol.  Follow-up as able.    Portions of this note were completed using Dragon dictation software.  Although reviewed, there may be typographical and other inadvertent errors that remain.             Patient Instructions   Thank you for visiting [facility]    Double check your medications to be sure that we know what you're taking accurately.      Quit smoking.  This would help a lot with your blood flow.      I would recommend addition of amlodipine to your current medications to get your blood pressure controlled.    I'd also recommend trying Crestor to help with your cholesterol and arteries/stroke/diabetes risk.    Please Follow Up when indicated on the section below this one on your After-Visit Summary.      If you had imaging scheduled please refer to your radiology prep sheet.    Appointment    Date_______________     Time_____________    Day:   M TU W TH F    With____________________________    Location_________________________    If you need medication refills, please contact your pharmacy 3 days before  your prescriptions runs out. If you are out of refills, your pharmacy will contact contact the clinic.    Contact us or return if questions or concerns.     -Your Care Team:  MD Danielle Padron PA-C Joel De Haan, PA-C Elizabeth McLean, APRN CNP    General information about your clinic      Clinic hours:     Lab hours:  Phone [phone]  Monday 7:30 am-7 pm    Monday 8:30 am-6:30 pm  Tuesday-Friday 7:30 am-5 pm   Tuesday-Friday 8:30 am-4:30 pm    Pharmacy hours:  Phone [phone]  Monday 8:30 am-7pm  Tuesday-Friday 8:30am-6 pm                                       Mychart assistance [phone]        We would like to hear from you, how was your visit today?    Arlette Hill  Patient Information Supervisor   Patient Care Supervisor  [facility], and [facility], and [facility]  [phone]         Corky Yang MD, MD  [facility]

## 2018-09-06 PROBLEM — L97.909 PERIPHERAL VASCULAR DISEASE OF LOWER EXTREMITY WITH ULCERATION (H): Status: ACTIVE | Noted: 2018-01-01

## 2018-09-06 PROBLEM — I73.9 PERIPHERAL VASCULAR DISEASE OF LOWER EXTREMITY WITH ULCERATION (H): Status: ACTIVE | Noted: 2018-01-01

## 2018-09-06 NOTE — PROGRESS NOTES
Diabetes is controlled.  Kidney function is poor.  Keep BP under control to help with this.  Statin medication may help as well.  Quitting smoking would help the most.      Thanks,    Dr. Yang

## 2018-09-06 NOTE — MR AVS SNAPSHOT
After Visit Summary   9/6/2018    Aston Linn    MRN: [id number]           Patient Information     Date Of Birth          9/21/1926        Visit Information        Provider Department      9/6/2018 8:00 AM Corky Yang MD [facility]        Today's Diagnoses     Peripheral vascular disease of lower extremity with ulceration (H)    -  1    PAD (peripheral artery disease) (H)        Type 2 diabetes mellitus with diabetic nephropathy, without long-term current use of insulin (H)        Hypertension goal BP (blood pressure) < 130/80        CKD (chronic kidney disease) stage 3, GFR 30-59 ml/min        Hyperlipidemia LDL goal <100        Chronic obstructive pulmonary disease, unspecified COPD type (H)          Care Instructions    Thank you for visiting [facility]    Double check your medications to be sure that we know what you're taking accurately.      Quit smoking.  This would help a lot with your blood flow.      I would recommend addition of amlodipine to your current medications to get your blood pressure controlled.    I'd also recommend trying Crestor to help with your cholesterol and arteries/stroke/diabetes risk.    Please Follow Up when indicated on the section below this one on your After-Visit Summary.      If you had imaging scheduled please refer to your radiology prep sheet.    Appointment    Date_______________     Time_____________    Day:   M TU W TH F    With____________________________    Location_________________________    If you need medication refills, please contact your pharmacy 3 days before your prescriptions runs out. If you are out of refills, your pharmacy will contact contact the clinic.    Contact us or return if questions or concerns.     -Your Care Team:  MD Danielle Padron PA-C Joel De Haan, PA-C Elizabeth McLean, APRN CNP    General information about your clinic      Clinic hours:     Lab  hours:  Phone [phone]  Monday 7:30 am-7 pm    Monday 8:30 am-6:30 pm  Tuesday-Friday 7:30 am-5 pm   Tuesday-Friday 8:30 am-4:30 pm    Pharmacy hours:  Phone [phone]  Monday 8:30 am-7pm  Tuesday-Friday 8:30am-6 pm                                       Saraht assistance [phone]        We would like to hear from you, how was your visit today?    Arlette Hill  Patient Information Supervisor   Patient Care Supervisor  [facility] [place], and [facility], and [facility]  [phone]             Follow-ups after your visit        Follow-up notes from your care team     Return in about 4 weeks (around 10/4/2018).      Your next 10 appointments already scheduled     Sep 14, 2018 10:00 AM CDT   US CAROTID BILATERAL with SHVUS1   [facility] ([facility])    [address]  [facility] [address]   [phone]           Please bring a list of your medicines (including vitamins, minerals and over-the-counter drugs). Also, tell your doctor about any allergies you may have. Wear comfortable clothes and leave your valuables at home.  You do not need to do anything special to prepare for your exam.  Please call the Imaging Department at your exam site with any questions.            Sep 14, 2018 10:45 AM CDT   US ABDOMINAL AORTIC IMAGING with SHVUS1   [facility] ([facility])    [address]  [facility] [address]   [phone]           Please bring a list of your medicines (including vitamins, minerals and over-the-counter drugs). Also, tell your doctor about any allergies you may have. Wear comfortable clothes and leave your valuables at home.  Adults: No eating or drinking for 8 hours before the exam. You may take medicine with a small sip of water.  Children: - Infants, breast-fed: may have breast  milk up to 2 hours before exam. - Infants, formula: may have bottle until 4 hours before exam. - Children 1-5 years: No food or drink for 4 hours before exam. - Children 6 -12 years: No food or drink for 6 hours before exam. - Children over 12 years: No food or drink for 8 hours before exam. - J Tube Fed: No need to stop feedings.  Please call the Imaging Department at your exam site with any questions.            Sep 14, 2018 11:30 AM CDT   New Visit with Sam Leach MD   [facility] ([facility])    [address]  [facility] [address] [phone]              Who to contact     If you have questions or need follow up information about today's clinic visit or your schedule please contact [facility] directly at [phone].  Normal or non-critical lab and imaging results will be communicated to you by MyChart, letter or phone within 4 business days after the clinic has received the results. If you do not hear from us within 7 days, please contact the clinic through [employer] or phone. If you have a critical or abnormal lab result, we will notify you by phone as soon as possible.  Submit refill requests through [employer] or call your pharmacy and they will forward the refill request to us. Please allow 3 business days for your refill to be completed.          Additional Information About Your Visit        Care EveryWhere ID     This is your Care EveryWhere ID. This could be used by other organizations to access your [place] medical records  [id number]        Your Vitals Were     Pulse Temperature Respirations Pulse Oximetry          90 96.5  F (35.8  C) (Temporal) 16 95%         Blood Pressure from Last 3 Encounters:   09/06/18 158/80   08/23/18 180/86   08/13/18 172/74    Weight from Last 3 Encounters:   08/23/18 211 lb (95.7 kg)   08/13/18 211 lb (95.7 kg)   05/07/18 211 lb (95.7 kg)              We  Performed the Following     Comprehensive metabolic panel     Hemoglobin A1c          Today's Medication Changes          These changes are accurate as of 9/6/18  8:43 AM.  If you have any questions, ask your nurse or doctor.               Start taking these medicines.        Dose/Directions    amLODIPine 2.5 MG tablet   Commonly known as:  NORVASC   Used for:  CKD (chronic kidney disease) stage 3, GFR 30-59 ml/min, Hypertension goal BP (blood pressure) < 130/80   Started by:  Corky Yang MD        Dose:  2.5 mg   Take 1 tablet (2.5 mg) by mouth daily   Quantity:  30 tablet   Refills:  1       rosuvastatin 5 MG tablet   Commonly known as:  CRESTOR   Used for:  PAD (peripheral artery disease) (H), Peripheral vascular disease of lower extremity with ulceration (H), Hyperlipidemia LDL goal <100, CKD (chronic kidney disease) stage 3, GFR 30-59 ml/min   Started by:  Corky Yang MD        Dose:  2.5 mg   Take 0.5 tablets (2.5 mg) by mouth daily   Quantity:  30 tablet   Refills:  1            Where to get your medicines      These medications were sent to [address]  [address] [facility] [address]     Phone:  [phone]     amLODIPine 2.5 MG tablet    rosuvastatin 5 MG tablet                Primary Care Provider Office Phone # Fax #    Corky Yang [phone] [address] [facility] [address]        Equal Access to Services     [facility]: Hadii lino tate hadrosalvao Sosrinath, waaxda luqadaha, qaybta kaalmada oliveda, katt ramírez . So [facility] [phone].    ATENCIÓN: Si habla español, tiene a martinez disposición servicios gratuitos de asistencia lingüística. Llame al [phone].    We comply with applicable federal civil rights laws and [place] laws. We do not discriminate on the basis of race, color, national origin, age, disability, sex, sexual orientation, or gender identity.            Thank you!      Thank you for choosing [facility]  for your care. Our goal is always to provide you with excellent care. Hearing back from our patients is one way we can continue to improve our services. Please take a few minutes to complete the written survey that you may receive in the mail after your visit with us. Thank you!             Your Updated Medication List - Protect others around you: Learn how to safely use, store and throw away your medicines at www.disposemymeds.org.          This list is accurate as of 9/6/18  8:43 AM.  Always use your most recent med list.                   Brand Name Dispense Instructions for use Diagnosis    ACCU-CHEK BAL PLUS test strip   Generic drug:  blood glucose monitoring     100 each    USE TO TEST BLOOD SUGARS 1-3 TIMES DAILY AS DIRECTED.    Diabetic polyneuropathy associated with type 2 diabetes mellitus (H)       albuterol 108 (90 Base) MCG/ACT inhaler    PROAIR HFA/PROVENTIL HFA/VENTOLIN HFA    1 Inhaler    Inhale 2 puffs into the lungs every 6 hours as needed for shortness of breath / dyspnea or wheezing    Chronic obstructive pulmonary disease, unspecified COPD type (H)       amLODIPine 2.5 MG tablet    NORVASC    30 tablet    Take 1 tablet (2.5 mg) by mouth daily    CKD (chronic kidney disease) stage 3, GFR 30-59 ml/min, Hypertension goal BP (blood pressure) < 130/80       ASPIRIN NOT PRESCRIBED    INTENTIONAL    0 each    1 each continuous prn for other Antiplatelet medication not prescribed intentionally due to Uncontrolled hypertension (systolic > 180, diastolic > 100) and Current thienopryridine therapy    Diabetic polyneuropathy associated with type 2 diabetes mellitus (H)       clorazepate 3.75 MG tablet    TRANXENE-T    30 tablet    Take 1 tablet (3.75 mg) by mouth nightly as needed    Psychophysiological insomnia       gabapentin 300 MG capsule    NEURONTIN    90 capsule    Take 1 capsule (300 mg) by mouth 2 times daily as needed    Diabetic  polyneuropathy associated with type 2 diabetes mellitus (H)       glipiZIDE 10 MG tablet    GLUCOTROL    180 tablet    Take 1 tablet by mouth 2 times daily (before meals).        hydrochlorothiazide 25 MG tablet    HYDRODIURIL    90 tablet    Take 1 tablet (25 mg) by mouth daily    CKD (chronic kidney disease) stage 3, GFR 30-59 ml/min, Hypertension goal BP (blood pressure) < 130/80       HYDROcodone-acetaminophen 5-325 MG per tablet    NORCO    18 tablet    Take 1 tablet by mouth every 4 hours as needed for pain        LORazepam 0.5 MG tablet    ATIVAN    20 tablet    Take 1 tablet (0.5 mg) by mouth nightly as needed for anxiety    Anxiety state, unspecified       losartan 100 MG tablet    COZAAR    30 tablet    Take 0.5 tablets (50 mg) by mouth 2 times daily    Type 2 diabetes, HbA1C goal < 8% (H), Hypertension, goal below 140/90       Olodaterol HCl 2.5 MCG/ACT Aers      Inhale 1 puff into the lungs        pantoprazole 40 MG EC tablet    PROTONIX    90 tablet    TAKE ONE TABLET BY MOUTH ONCE DAILY    Gastroesophageal reflux disease without esophagitis       PLAVIX 75 MG tablet   Generic drug:  clopidogrel      1 TABLET DAILY        rosuvastatin 5 MG tablet    CRESTOR    30 tablet    Take 0.5 tablets (2.5 mg) by mouth daily    PAD (peripheral artery disease) (H), Peripheral vascular disease of lower extremity with ulceration (H), Hyperlipidemia LDL goal <100, CKD (chronic kidney disease) stage 3, GFR 30-59 ml/min       STATIN NOT PRESCRIBED (INTENTIONAL)     0 each    Statin not prescribed intentionally due to Intolerance    Type 2 diabetes, HbA1C goal < 8% (H)

## 2018-09-06 NOTE — PATIENT INSTRUCTIONS
Thank you for visiting [facility]    Double check your medications to be sure that we know what you're taking accurately.      Quit smoking.  This would help a lot with your blood flow.      I would recommend addition of amlodipine to your current medications to get your blood pressure controlled.    I'd also recommend trying Crestor to help with your cholesterol and arteries/stroke/diabetes risk.    Please Follow Up when indicated on the section below this one on your After-Visit Summary.      If you had imaging scheduled please refer to your radiology prep sheet.    Appointment    Date_______________     Time_____________    Day:   M TU W TH F    With____________________________    Location_________________________    If you need medication refills, please contact your pharmacy 3 days before your prescriptions runs out. If you are out of refills, your pharmacy will contact contact the clinic.    Contact us or return if questions or concerns.     -Your Care Team:  MD Danielle Padron PA-C Joel De Haan, PA-C Elizabeth McLean, APRN CNP    General information about your clinic      Clinic hours:     Lab hours:  Phone [phone]  Monday 7:30 am-7 pm    Monday 8:30 am-6:30 pm  Tuesday-Friday 7:30 am-5 pm   Tuesday-Friday 8:30 am-4:30 pm    Pharmacy hours:  Phone [phone]  Monday 8:30 am-7pm  Tuesday-Friday 8:30am-6 pm                                       Mychart assistance [phone]        We would like to hear from you, how was your visit today?    Arlette Hill  Patient Information Supervisor   Patient Care Supervisor  [place], [place], and [facility] [place], and [facility]  [phone]

## 2018-09-06 NOTE — TELEPHONE ENCOUNTER
Results given to Kamille (wife) patient was there as well,  no further questions   Closing encounter  Desiree Grant RT (R)         Notes Recorded by Corky Yang MD on 9/6/2018 at 4:24 PM  Diabetes is controlled.  Kidney function is poor.  Keep BP under control to help with this.  Statin medication may help as well.  Quitting smoking would help the most.      Thanks,    Dr. Yang

## 2018-09-14 PROBLEM — S91.301A OPEN WOUND OF HEEL, RIGHT, INITIAL ENCOUNTER: Status: ACTIVE | Noted: 2018-01-01

## 2018-09-14 NOTE — LETTER
[facility] [address]  [place] [address]  Phone: [phone]  Fax: [phone]      2018    Re: Aston MO Tem[place]ope - 1926    PRIMARY CARE PROVIDER: Corky Yang      Reason for visit:  Right heel wound for the last month.     IMPRESSION:  89 yo male with a history of CKD stage 3/4, diabetes type II, and long history of tobacco abuse who presents with non-healing right heel wound and critical limb ischemia.     RECOMMENDATION:  I've recommended that we proceed with a right lower leg arterial duplex to evaluate where his disease is given his CKD stage 3/4.  Given his history I suspect he has multilevel disease and his KATE on the right is 0.41.  I think he may ultimately need an angiogram of the right lower leg with CO2 and extremely limited contrast use to see if there is anything we can do to improve blood flow to the right foot.  I'd like to give him 2 more weeks to allow wound healing, since both the patient and his wife say the wound is getting better.  He will see his podiatrist in [place] and I will need to get an update on the quality of the wound.  If it's stalled or worsening I'd favor angiogram.  He also has a left ICA stenosis with a PSV of 528 cm/s and  cm/s.  I've reviewed the asymptomatic data with him of a stroke risk of 12 % in 5 years with asymptomatic disease and best medical management.  He does NOT want surgical intervention on his carotid artery.  He has known about this lesion for numerous years and has declined intervention before.  We've reviewed the reasons why surgery would be beneficial, but the patient has declined.  I'd recommend ASA 81 mg po daily, he is currently on Plavix 75 mg po daily and this may be adequate.  Also he is on Crestor for hyperlipidemia.      HPI:  Aston Linn is a 89 year old male who was seen today in consultation by Dr. Yang regarding a 1 month old right heel wound that is  still non-healing.  He says that there is pain in the heel when he stands, but this has slowly improved.  His podiatrist is in [place] and had made the referral to us for evaluation.  The patient has a long history of diabetes II, tobacco abuse, and CKD 3/4.  He denies fevers, chills and minimal drainage from the wound.  He is also unsure how the wound got on his foot.  With local wound care he says there has been slight improvement in the size of the wound.      ALLERGIES:  No known allergies     MEDS:    Current Outpatient Prescriptions:      ACCU-CHEK BAL PLUS test strip, USE TO TEST BLOOD SUGARS 1-3 TIMES DAILY AS DIRECTED., Disp: 100 each, Rfl: 3     albuterol (PROAIR HFA/PROVENTIL HFA/VENTOLIN HFA) 108 (90 Base) MCG/ACT inhaler, Inhale 2 puffs into the lungs every 6 hours as needed for shortness of breath / dyspnea or wheezing, Disp: 1 Inhaler, Rfl: 1     amLODIPine (NORVASC) 2.5 MG tablet, Take 1 tablet (2.5 mg) by mouth daily, Disp: 30 tablet, Rfl: 1     ASPIRIN NOT PRESCRIBED, INTENTIONAL,, 1 each continuous prn for other Antiplatelet medication not prescribed intentionally due to Uncontrolled hypertension (systolic > 180, diastolic > 100) and Current thienopryridine therapy, Disp: 0 each, Rfl: 0     clorazepate (TRANXENE) 3.75 MG tablet, Take 1 tablet (3.75 mg) by mouth nightly as needed, Disp: 30 tablet, Rfl: 5     gabapentin (NEURONTIN) 300 MG capsule, Take 1 capsule (300 mg) by mouth 2 times daily as needed, Disp: 90 capsule, Rfl: 1     glipiZIDE (GLUCOTROL) 10 MG tablet, Take 1 tablet by mouth 2 times daily (before meals)., Disp: 180 tablet, Rfl: 1     hydrochlorothiazide (HYDRODIURIL) 25 MG tablet, Take 1 tablet (25 mg) by mouth daily, Disp: 90 tablet, Rfl: 1     HYDROcodone-acetaminophen (NORCO) 5-325 MG per tablet, Take 1 tablet by mouth every 4 hours as needed for pain, Disp: 18 tablet, Rfl: 0     LORazepam (ATIVAN) 0.5 MG tablet, Take 1 tablet (0.5 mg) by mouth nightly as needed for anxiety,  Disp: 20 tablet, Rfl: 0     losartan (COZAAR) 100 MG tablet, Take 0.5 tablets (50 mg) by mouth 2 times daily, Disp: 30 tablet, Rfl: 1     Olodaterol HCl 2.5 MCG/ACT AERS, Inhale 1 puff into the lungs, Disp: , Rfl:      pantoprazole (PROTONIX) 40 MG EC tablet, TAKE ONE TABLET BY MOUTH ONCE DAILY, Disp: 90 tablet, Rfl: 2     PLAVIX 75 MG OR TABS, 1 TABLET DAILY, Disp: , Rfl:      rosuvastatin (CRESTOR) 5 MG tablet, Take 0.5 tablets (2.5 mg) by mouth daily, Disp: 30 tablet, Rfl: 1     STATIN NOT PRESCRIBED, INTENTIONAL,, Statin not prescribed intentionally due to Intolerance, Disp: 0 each, Rfl: 0      There is no height or weight on file to calculate BMI.     EXAM:  GENERAL: This is a well-developed 89 year old male who appears his stated age  EYES: Grossly normal.  MOUTH: Buccal mucosa normal   CARDIAC:  NS1 S2, No Murmur  CHEST/LUNG:  Clear lung fields bilaterally   GASTROINTESINAL (ABDOMEN): Soft, non-tender, B/S present, no pulsatile mass  MUSCULOSKELETAL: Grossly normal and both lower extremities are intact.  HEME/LYMPH: No lymphedema  NEUROLOGIC: Focally intact, Alert and oriented x 3.   PSYCH: appropriate affect  INTEGUMENT: Right heel ulceration is 3-4 mm in size with subcutaneous tissue exposed. Tender to the touch.  He has no drainage and no signs of infection.    Pulse Exam:      Radial: Left 2                        Right  2     Femoral: Left 2                        Right  2     DP:                Left 0                        Right  0      PT:                 Left 0                        Right  0     Monophasic signals at the ankle in the PT and DP.       DIAGNOSTIC STUDIES:      Images:  Us Kate Doppler No Exercise     Result Date: 8/16/2018  PROCEDURE: KATE with Doppler Waveforms of the bilateral lower extremities DATE OF PROCEDURE: 8/16/2018 1:31 PM CLINICAL HISTORY/INDICATION: 89-year-old male with a right heel ulcer COMPARISON: 5/8/2014 TECHNIQUE: Resting ankle branchial indices and waveform analysis  of the bilateral lower extremities FINDINGS: Right: Ankle PT Index: 0.33 mm/H Wave: Biphasic Ankle DP Index: 0.41 mm/H Wave: Biphasic Left: Ankle PT Index: 0.52 mm/H Wave: Biphasic Ankle DP Index: 0.44 mm/H Wave: Biphasic      Impression: 1.  Resting ABIs compatible with severe arterial insufficiency on the right, worsened from prior examination. 2.  Resting ABIs compatible with moderate to severe arterial insufficiency in the left, worsened from prior examination. KATE Diagnostic Criteria   >/= 1.3          Non compressible  0.95 - 1.29     Normal  0.90 - 0.94     Mild PAD  0.50 - 0.89     Moderate PAD  0.20 - 0.49     Severe PAD  < 0.20             Critical PAD PALLAVI MCLAUGHLIN MD      I personally reviewed the images and my interpretation is severely diminished bilateral KATE's of 0.41 on the right and 0.52 on the left.      LABS:      Sodium   Date Value Ref Range Status   2018 139 133 - 144 mmol/L Final   10/30/2017 138 133 - 144 mmol/L Final   10/14/2016 141 133 - 144 mmol/L Final            Urea Nitrogen   Date Value Ref Range Status   2018 57 (H) 7 - 30 mg/dL Final   10/30/2017 57 (H) 7 - 30 mg/dL Final   10/14/2016 61 (H) 7 - 30 mg/dL Final            Hemoglobin   Date Value Ref Range Status   2018 11.9 (L) 13.3 - 17.7 g/dL Final   2018 11.4 (L) 13.3 - 17.7 g/dL Final   10/30/2017 10.8 (L) 13.3 - 17.7 g/dL Final            Platelet Count   Date Value Ref Range Status   2018 285 150 - 450 10e9/L Final   2018 422 150 - 450 10e9/L Final   10/14/2016 375 150 - 450 10e9/L Final            INR   Date Value Ref Range Status   10/27/2010 0.99 0.86 - 1.14 Final   2004 1.07 0.86 - 1.14 Final   2003 1.02 0.86 - 1.14 Final         Sam Leach MD  VASCULAR SURGERY

## 2018-09-14 NOTE — PROGRESS NOTES
VASCULAR SURGERY CLINIC CONSULTATION    VASCULAR SURGEON: Sam Leach MD    LOCATION:  [facility]    Aston Linn   Medical Record #:  [id number]  YOB: 1926  Age:  89 year old     Date of Service: 9/14/2018    PRIMARY CARE PROVIDER: Corky Yang      Reason for visit:  Right heel wound for the last month.    IMPRESSION:  89 yo male with a history of CKD stage 3/4, diabetes type II, and long history of tobacco abuse who presents with non-healing right heel wound and critical limb ischemia.    RECOMMENDATION:  I've recommended that we proceed with a right lower leg arterial duplex to evaluate where his disease is given his CKD stage 3/4.  Given his history I suspect he has multilevel disease and his KATE on the right is 0.41.  I think he may ultimately need an angiogram of the right lower leg with CO2 and extremely limited contrast use to see if there is anything we can do to improve blood flow to the right foot.  I'd like to give him 2 more weeks to allow wound healing, since both the patient and his wife say the wound is getting better.  He will see his podiatrist in [place] and I will need to get an update on the quality of the wound.  If it's stalled or worsening I'd favor angiogram.  He also has a left ICA stenosis with a PSV of 528 cm/s and  cm/s.  I've reviewed the asymptomatic data with him of a stroke risk of 12 % in 5 years with asymptomatic disease and best medical management.  He does NOT want surgical intervention on his carotid artery.  He has known about this lesion for numerous years and has declined intervention before.  We've reviewed the reasons why surgery would be beneficial, but the patient has declined.  I'd recommend ASA 81 mg po daily, he is currently on Plavix 75 mg po daily and this may be adequate.  Also he is on Crestor for hyperlipidemia.     HPI:  Aston Linn is a 89 year old male who was seen today in  consultation by Dr. Yang regarding a 1 month old right heel wound that is still non-healing.  He says that there is pain in the heel when he stands, but this has slowly improved.  His podiatrist is in [place] and had made the referral to us for evaluation.  The patient has a long history of diabetes II, tobacco abuse, and CKD 3/4.  He denies fevers, chills and minimal drainage from the wound.  He is also unsure how the wound got on his foot.  With local wound care he says there has been slight improvement in the size of the wound.     [facility]:    Past Medical History:   Diagnosis Date     Aortic aneurysm (H)      Blood transfusion      Carotid artery stenosis     80% lesion on left     CKD (chronic kidney disease) stage 3, GFR 30-59 ml/min      COPD (chronic obstructive pulmonary disease) (H)      Diabetic eye exam (H) 03/29/11     Diabetic eye exam (H) 02/12/2015    Dr Attila Roper     Emphysema of lung (H)      Hypertension      Peptic ulcer disease with hemorrhage      TIA (transient ischaemic attack)      Type II or unspecified type diabetes mellitus without mention of complication, not stated as uncontrolled         Past Surgical History:   Procedure Laterality Date     AMPUTATION      PARTIAL LEFT INDEX FINGERTIP     APPENDECTOMY       COLONOSCOPY  11/15/2010    COMBINED COLONOSCOPY, SINGLE BIOPSY/POLYPECTOMY BY BIOPSY performed by JUDSON TOWNSEND at  GI     COLONOSCOPY  11/15/2010    COMBINED COLONOSCOPY, REMOVE TUMOR/POLYP/LESION BY SNARE performed by JUDSON TOWNSEND at  GI     ESOPHAGOSCOPY, GASTROSCOPY, DUODENOSCOPY (EGD), COMBINED  10/28/2010    COMBINED ESOPHAGOSCOPY, GASTROSCOPY, DUODENOSCOPY (EGD) performed by MORE ROBISON at  OR      REMV CATARACT EXTRACAP,INSERT LENS  07/20/06    right eye     HC REMV CATARACT EXTRACAP,INSERT LENS  8/17/2006    left eye     HEAD & NECK SURGERY      CERVICAL SPINE     ORTHOPEDIC SURGERY      CERVICAL FUSION       ALLERGIES:  No known  allergies    MEDS:    Current Outpatient Prescriptions:      ACCU-CHEK BAL PLUS test strip, USE TO TEST BLOOD SUGARS 1-3 TIMES DAILY AS DIRECTED., Disp: 100 each, Rfl: 3     albuterol (PROAIR HFA/PROVENTIL HFA/VENTOLIN HFA) 108 (90 Base) MCG/ACT inhaler, Inhale 2 puffs into the lungs every 6 hours as needed for shortness of breath / dyspnea or wheezing, Disp: 1 Inhaler, Rfl: 1     amLODIPine (NORVASC) 2.5 MG tablet, Take 1 tablet (2.5 mg) by mouth daily, Disp: 30 tablet, Rfl: 1     ASPIRIN NOT PRESCRIBED, INTENTIONAL,, 1 each continuous prn for other Antiplatelet medication not prescribed intentionally due to Uncontrolled hypertension (systolic > 180, diastolic > 100) and Current thienopryridine therapy, Disp: 0 each, Rfl: 0     clorazepate (TRANXENE) 3.75 MG tablet, Take 1 tablet (3.75 mg) by mouth nightly as needed, Disp: 30 tablet, Rfl: 5     gabapentin (NEURONTIN) 300 MG capsule, Take 1 capsule (300 mg) by mouth 2 times daily as needed, Disp: 90 capsule, Rfl: 1     glipiZIDE (GLUCOTROL) 10 MG tablet, Take 1 tablet by mouth 2 times daily (before meals)., Disp: 180 tablet, Rfl: 1     hydrochlorothiazide (HYDRODIURIL) 25 MG tablet, Take 1 tablet (25 mg) by mouth daily, Disp: 90 tablet, Rfl: 1     HYDROcodone-acetaminophen (NORCO) 5-325 MG per tablet, Take 1 tablet by mouth every 4 hours as needed for pain, Disp: 18 tablet, Rfl: 0     LORazepam (ATIVAN) 0.5 MG tablet, Take 1 tablet (0.5 mg) by mouth nightly as needed for anxiety, Disp: 20 tablet, Rfl: 0     losartan (COZAAR) 100 MG tablet, Take 0.5 tablets (50 mg) by mouth 2 times daily, Disp: 30 tablet, Rfl: 1     Olodaterol HCl 2.5 MCG/ACT AERS, Inhale 1 puff into the lungs, Disp: , Rfl:      pantoprazole (PROTONIX) 40 MG EC tablet, TAKE ONE TABLET BY MOUTH ONCE DAILY, Disp: 90 tablet, Rfl: 2     PLAVIX 75 MG OR TABS, 1 TABLET DAILY, Disp: , Rfl:      rosuvastatin (CRESTOR) 5 MG tablet, Take 0.5 tablets (2.5 mg) by mouth daily, Disp: 30 tablet, Rfl: 1     STATIN  NOT PRESCRIBED, INTENTIONAL,, Statin not prescribed intentionally due to Intolerance, Disp: 0 each, Rfl: 0    SOCIAL HABITS:    History   Smoking Status     Current Some Day Smoker     Packs/day: 0.25     Years: 60.00     Types: Cigarettes   Smokeless Tobacco     Never Used       Alcohol use No     History   Drug Use No       FAMILY HISTORY:    Family History   Problem Relation Age of Onset     Diabetes Mother      Cancer Sister      brain       REVIEW OF SYSTEMS:    A 12 point ROS was reviewed and except for what is listed in the HPI above, all others are negative    PE:  /81 (BP Location: Right arm, Patient Position: Chair, Cuff Size: Adult Regular)  Pulse 77  Wt Readings from Last 1 Encounters:   08/23/18 211 lb (95.7 kg)     There is no height or weight on file to calculate BMI.    EXAM:  GENERAL: This is a well-developed 89 year old male who appears his stated age  EYES: Grossly normal.  MOUTH: Buccal mucosa normal   CARDIAC:  NS1 S2, No Murmur  CHEST/LUNG:  Clear lung fields bilaterally   GASTROINTESINAL (ABDOMEN): Soft, non-tender, B/S present, no pulsatile mass  MUSCULOSKELETAL: Grossly normal and both lower extremities are intact.  HEME/LYMPH: No lymphedema  NEUROLOGIC: Focally intact, Alert and oriented x 3.   PSYCH: appropriate affect  INTEGUMENT: Right heel ulceration is 3-4 mm in size with subcutaneous tissue exposed.  Tender to the touch.  He has no drainage and no signs of infection.    Pulse Exam:     Radial: Left 2   Right  2    Femoral: Left 2   Right  2    DP: Left 0   Right  0     PT:   Left 0   Right  0    Monophasic signals at the ankle in the PT and DP.       DIAGNOSTIC STUDIES:     Images:  Us Kate Doppler No Exercise    Result Date: 8/16/2018  PROCEDURE: KATE with Doppler Waveforms of the bilateral lower extremities DATE OF PROCEDURE: 8/16/2018 1:31 PM CLINICAL HISTORY/INDICATION: 89-year-old male with a right heel ulcer COMPARISON: 5/8/2014 TECHNIQUE: Resting ankle branchial indices  and waveform analysis of the bilateral lower extremities FINDINGS: Right: Ankle PT Index: 0.33 mm/H Wave: Biphasic Ankle DP Index: 0.41 mm/H Wave: Biphasic Left: Ankle PT Index: 0.52 mm/H Wave: Biphasic Ankle DP Index: 0.44 mm/H Wave: Biphasic     Impression: 1.  Resting ABIs compatible with severe arterial insufficiency on the right, worsened from prior examination. 2.  Resting ABIs compatible with moderate to severe arterial insufficiency in the left, worsened from prior examination. KATE Diagnostic Criteria   >/= 1.3          Non compressible  0.95 - 1.29     Normal  0.90 - 0.94     Mild PAD  0.50 - 0.89     Moderate PAD  0.20 - 0.49     Severe PAD  < 0.20             Critical PAD PALLAVI MCLAUGHLIN MD      I personally reviewed the images and my interpretation is severely diminished bilateral KATE's of 0.41 on the right and 0.52 on the left.     LABS:      Sodium   Date Value Ref Range Status   2018 139 133 - 144 mmol/L Final   10/30/2017 138 133 - 144 mmol/L Final   10/14/2016 141 133 - 144 mmol/L Final     Urea Nitrogen   Date Value Ref Range Status   2018 57 (H) 7 - 30 mg/dL Final   10/30/2017 57 (H) 7 - 30 mg/dL Final   10/14/2016 61 (H) 7 - 30 mg/dL Final     Hemoglobin   Date Value Ref Range Status   2018 11.9 (L) 13.3 - 17.7 g/dL Final   2018 11.4 (L) 13.3 - 17.7 g/dL Final   10/30/2017 10.8 (L) 13.3 - 17.7 g/dL Final     Platelet Count   Date Value Ref Range Status   2018 285 150 - 450 10e9/L Final   2018 422 150 - 450 10e9/L Final   10/14/2016 375 150 - 450 10e9/L Final     INR   Date Value Ref Range Status   10/27/2010 0.99 0.86 - 1.14 Final   2004 1.07 0.86 - 1.14 Final   2003 1.02 0.86 - 1.14 Final       Sam Leach MD  VASCULAR SURGERY

## 2018-09-14 NOTE — NURSING NOTE
"Aston Linn is a 89 year old male who presents for:  Chief Complaint   Patient presents with     Consult     History of bilateral carotid artery, stroke, AAA, right heel ulcer; coordinate imaging and consult - patient's wife declined earlier offered appts due to transportation issues *kevin   (10:00 [facility]; 11:30 [facility])        Vitals:    Vitals:    09/14/18 1138 09/14/18 1139   BP: 196/77 191/81   BP Location: Right arm Right arm   Patient Position: Chair Chair   Cuff Size: Adult Regular Adult Regular   Pulse: 77 77       BMI:  Estimated body mass index is 29.43 kg/(m^2) as calculated from the following:    Height as of 8/23/18: 5' 11\" (1.803 m).    Weight as of 8/23/18: 211 lb (95.7 kg).    Pain Score:  Data Unavailable        Mary Queen MA      "

## 2018-09-14 NOTE — MR AVS SNAPSHOT
After Visit Summary   9/14/2018    Aston Linn    MRN: [id number]           Patient Information     Date Of Birth          9/21/1926        Visit Information        Provider Department      9/14/2018 11:30 AM Sam Leach MD [facility] [employer] [facility]      Today's Diagnoses     Open wound of heel, right, initial encounter    -  1    Type 2 diabetes mellitus with diabetic neuropathy, without long-term current use of insulin (H)        Hyperlipidemia LDL goal <100        Left carotid artery stenosis        CKD (chronic kidney disease) stage 4, GFR 15-29 ml/min (H)           Follow-ups after your visit        Future tests that were ordered for you today     Open Future Orders        Priority Expected Expires Ordered    US Lower Extremity Arterial Duplex Right Routine 9/14/2018 9/14/2019 9/14/2018            Who to contact     If you have questions or need follow up information about today's clinic visit or your schedule please contact [facility] directly at [phone].  Normal or non-critical lab and imaging results will be communicated to you by MyChart, letter or phone within 4 business days after the clinic has received the results. If you do not hear from us within 7 days, please contact the clinic through Skytaphart or phone. If you have a critical or abnormal lab result, we will notify you by phone as soon as possible.  Submit refill requests through Foodista or call your pharmacy and they will forward the refill request to us. Please allow 3 business days for your refill to be completed.          Additional Information About Your Visit        Care EveryWhere ID     This is your Care EveryWhere ID. This could be used by other organizations to access your [place] medical records  [id number]        Your Vitals Were     Pulse                   77            Blood Pressure from Last 3 Encounters:   09/14/18 191/81    09/06/18 156/70   08/23/18 180/86    Weight from Last 3 Encounters:   08/23/18 211 lb (95.7 kg)   08/13/18 211 lb (95.7 kg)   05/07/18 211 lb (95.7 kg)              Today, you had the following     No orders found for display       Primary Care Provider Office Phone # Fax #    Corky Yang [phone] [address] [place] [address]        Equal Access to Services     [facility]: Hadii aad ku hadasho Soomaali, waaxda luqadaha, qaybta kaalmada adeegyada, waxay idiin hayaan adeeg kharash lalogan . So [facility] [phone].    ATENCIÓN: Si habla español, tiene a martinez disposición servicios gratuitos de asistencia lingüística. Llame al [phone].    We comply with applicable federal civil rights laws and [place] laws. We do not discriminate on the basis of race, color, national origin, age, disability, sex, sexual orientation, or gender identity.            Thank you!     Thank you for choosing [facility] [place]  for your care. Our goal is always to provide you with excellent care. Hearing back from our patients is one way we can continue to improve our services. Please take a few minutes to complete the written survey that you may receive in the mail after your visit with us. Thank you!             Your Updated Medication List - Protect others around you: Learn how to safely use, store and throw away your medicines at www.disposemymeds.org.          This list is accurate as of 9/14/18  5:40 PM.  Always use your most recent med list.                   Brand Name Dispense Instructions for use Diagnosis    ACCU-CHEK BAL PLUS test strip   Generic drug:  blood glucose monitoring     100 each    USE TO TEST BLOOD SUGARS 1-3 TIMES DAILY AS DIRECTED.    Diabetic polyneuropathy associated with type 2 diabetes mellitus (H)       albuterol 108 (90 Base) MCG/ACT inhaler    PROAIR HFA/PROVENTIL HFA/VENTOLIN HFA    1 Inhaler    Inhale 2 puffs into the lungs every 6 hours as  needed for shortness of breath / dyspnea or wheezing    Chronic obstructive pulmonary disease, unspecified COPD type (H)       amLODIPine 2.5 MG tablet    NORVASC    30 tablet    Take 1 tablet (2.5 mg) by mouth daily    Hypertension goal BP (blood pressure) < 130/80, CKD (chronic kidney disease) stage 4, GFR 15-29 ml/min (H)       ASPIRIN NOT PRESCRIBED    INTENTIONAL    0 each    1 each continuous prn for other Antiplatelet medication not prescribed intentionally due to Uncontrolled hypertension (systolic > 180, diastolic > 100) and Current thienopryridine therapy    Diabetic polyneuropathy associated with type 2 diabetes mellitus (H)       clorazepate 3.75 MG tablet    TRANXENE-T    30 tablet    Take 1 tablet (3.75 mg) by mouth nightly as needed    Psychophysiological insomnia       gabapentin 300 MG capsule    NEURONTIN    90 capsule    Take 1 capsule (300 mg) by mouth 2 times daily as needed    Diabetic polyneuropathy associated with type 2 diabetes mellitus (H)       glipiZIDE 10 MG tablet    GLUCOTROL    180 tablet    Take 1 tablet by mouth 2 times daily (before meals).        hydrochlorothiazide 25 MG tablet    HYDRODIURIL    90 tablet    Take 1 tablet (25 mg) by mouth daily    CKD (chronic kidney disease) stage 3, GFR 30-59 ml/min, Hypertension goal BP (blood pressure) < 130/80       HYDROcodone-acetaminophen 5-325 MG per tablet    NORCO    18 tablet    Take 1 tablet by mouth every 4 hours as needed for pain        LORazepam 0.5 MG tablet    ATIVAN    20 tablet    Take 1 tablet (0.5 mg) by mouth nightly as needed for anxiety    Anxiety state, unspecified       losartan 100 MG tablet    COZAAR    30 tablet    Take 0.5 tablets (50 mg) by mouth 2 times daily    Type 2 diabetes, HbA1C goal < 8% (H), Hypertension, goal below 140/90       Olodaterol HCl 2.5 MCG/ACT Aers      Inhale 1 puff into the lungs        pantoprazole 40 MG EC tablet    PROTONIX    90 tablet    TAKE ONE TABLET BY MOUTH ONCE DAILY     Gastroesophageal reflux disease without esophagitis       PLAVIX 75 MG tablet   Generic drug:  clopidogrel      1 TABLET DAILY        rosuvastatin 5 MG tablet    CRESTOR    30 tablet    Take 0.5 tablets (2.5 mg) by mouth daily    PAD (peripheral artery disease) (H), Peripheral vascular disease of lower extremity with ulceration (H), Hyperlipidemia LDL goal <100, CKD (chronic kidney disease) stage 4, GFR 15-29 ml/min (H)       STATIN NOT PRESCRIBED (INTENTIONAL)     0 each    Statin not prescribed intentionally due to Intolerance    Type 2 diabetes, HbA1C goal < 8% (H)

## 2018-09-26 NOTE — TELEPHONE ENCOUNTER
Test strips    Prescription approved per [facility] [facility].    Reina Carlisle, RN, BSN

## 2018-10-03 NOTE — PROGRESS NOTES
SUBJECTIVE:   Aston Linn is a 89 year old male who presents to clinic today for the following health issues:      HPI  Diabetes Follow-up    Patient is checking blood sugars: twice daily.    Results are as follows:         good    Diabetic concerns: None     Symptoms of hypoglycemia (low blood sugar): none     Paresthesias (numbness or burning in feet) or sores: Yes ulcer on foot saw podiatrist this morning     Date of last diabetic eye exam: is due he has an eye appt on 10/18/18    Diabetes Management Resources    Hyperlipidemia Follow-Up      Rate your low fat/cholesterol diet?: not monitoring fat    Taking statin?  No    Other lipid medications/supplements?:  none    Hypertension Follow-up      Outpatient blood pressures are not being checked.    Low Salt Diet: not monitoring salt    BP Readings from Last 2 Encounters:   10/11/18 160/82   09/14/18 191/81     Hemoglobin A1C (%)   Date Value   09/06/2018 6.3 (H)   04/09/2018 6.7 (H)     LDL Cholesterol Calculated (mg/dL)   Date Value   10/30/2017 138 (H)   12/02/2015 129 (H)     COPD Follow-Up    Symptoms are currently: stable    Current fatigue or dyspnea with ambulation: doesn't really walk    Shortness of breath: stable    Increased or change in Cough/Sputum: No    Fever(s): No    Any ER/UC or hospital admissions since your last visit? No     History   Smoking Status     Current Some Day Smoker     Packs/day: 0.25     Years: 60.00     Types: Cigarettes   Smokeless Tobacco     Never Used     No results found for: FEV1, [id number]  Chronic Kidney Disease Follow-up      Current NSAID use?  No    Problem list and histories reviewed & adjusted, as indicated.  Additional history: as documented    Pt here to f/u on his peripheral arterial disease.  He is tolerating the Crestor so far, but the cost is bothersome to him.  Insurance doesn't cover it at all.      He is cutting down his       Current Outpatient Prescriptions   Medication Sig Dispense Refill      ACCU-CHEK BAL PLUS test strip USE TO TEST BLOOD SUGARS 1-3 TIMES DAILY AS DIRECTED. 100 each 3     albuterol (PROAIR HFA/PROVENTIL HFA/VENTOLIN HFA) 108 (90 Base) MCG/ACT inhaler Inhale 2 puffs into the lungs every 6 hours as needed for shortness of breath / dyspnea or wheezing 1 Inhaler 1     amLODIPine (NORVASC) 2.5 MG tablet Take 1 tablet (2.5 mg) by mouth daily 30 tablet 1     ASPIRIN NOT PRESCRIBED, INTENTIONAL, 1 each continuous prn for other Antiplatelet medication not prescribed intentionally due to Uncontrolled hypertension (systolic > 180, diastolic > 100) and Current thienopryridine therapy 0 each 0     clorazepate (TRANXENE) 3.75 MG tablet Take 1 tablet (3.75 mg) by mouth nightly as needed 30 tablet 5     gabapentin (NEURONTIN) 300 MG capsule Take 1 capsule (300 mg) by mouth 2 times daily as needed 90 capsule 1     glipiZIDE (GLUCOTROL) 10 MG tablet Take 1 tablet by mouth 2 times daily (before meals). 180 tablet 1     hydrochlorothiazide (HYDRODIURIL) 25 MG tablet Take 1 tablet (25 mg) by mouth daily 90 tablet 1     HYDROcodone-acetaminophen (NORCO) 5-325 MG per tablet Take 1 tablet by mouth every 4 hours as needed for pain 18 tablet 0     LORazepam (ATIVAN) 0.5 MG tablet Take 1 tablet (0.5 mg) by mouth nightly as needed for anxiety 20 tablet 0     losartan (COZAAR) 100 MG tablet Take 0.5 tablets (50 mg) by mouth 2 times daily 30 tablet 1     Olodaterol HCl 2.5 MCG/ACT AERS Inhale 1 puff into the lungs       pantoprazole (PROTONIX) 40 MG EC tablet TAKE ONE TABLET BY MOUTH ONCE DAILY 90 tablet 2     PLAVIX 75 MG OR TABS 1 TABLET DAILY       rosuvastatin (CRESTOR) 5 MG tablet Take 0.5 tablets (2.5 mg) by mouth daily 30 tablet 1     STATIN NOT PRESCRIBED, INTENTIONAL, Statin not prescribed intentionally due to Intolerance 0 each 0     Recent Labs   Lab Test  09/06/18   0851 04/09/18  10/30/17   0948   12/02/15   1124   09/10/15   1215  12/03/14   1131   A1C  6.3*  6.7*  6.3*   < >  6.8*   --   6.9*  7.4*  "  LDL   --    --   138*   --   129*   --    --   151*   HDL   --    --   41   --   36*   --    --   35*   TRIG   --    --   174*   --   172*   --    --   156*   ALT  13  21  16   < >  16   < >  15  12   CR  2.39*  2.3*  2.51*   < >  2.61*   < >  2.00*  2.25*   GFRESTIMATED  26*  27*  24*   < >  23*   < >  32*  28*   GFRESTBLACK  31*   --   29*   < >  28*   < >  38*  33*   POTASSIUM  5.2  5.2*  5.2   < >  5.3   < >  4.8  5.5*   TSH   --    --   2.99   --    --    --   2.51  3.08    < > = values in this interval not displayed.      BP Readings from Last 3 Encounters:   10/11/18 158/70   10/11/18 160/82   09/14/18 191/81    Wt Readings from Last 3 Encounters:   10/11/18 211 lb (95.7 kg)   08/23/18 211 lb (95.7 kg)   08/13/18 211 lb (95.7 kg)                 ROS:  Constitutional, HEENT, cardiovascular, pulmonary, gi and gu systems are negative, except as otherwise noted.    OBJECTIVE:     /70 (BP Location: Left arm, Patient Position: Chair, Cuff Size: Adult Regular)  Pulse 90  Temp 96.8  F (36  C) (Temporal)  Resp 20  SpO2 92%  There is no height or weight on file to calculate BMI.  GENERAL: healthy, alert and no distress  NECK: no adenopathy, no asymmetry, masses, or scars and thyroid normal to palpation  RESP: lungs clear to auscultation - no rales, rhonchi or wheezes  CV: regular rate and rhythm, normal S1 S2, no S3 or S4, no murmur, click or rub, no peripheral edema and peripheral pulses strong  ABDOMEN: soft, nontender, no hepatosplenomegaly, no masses and bowel sounds normal  MS: \"Punched out\" ulcer on right heel without evidence of surrounding infection, nonpalpable pulse in his right leg.    Diagnostic Test Results:  Results for orders placed or performed during the hospital encounter of 09/14/18   US Abdominal Aorta Imaging    Narrative    ULTRASOUND ABDOMINAL AORTA LIMITED  9/14/2018 10:42 AM    HISTORY:  89-year-old patient with history of abdominal aortic  aneurysm.    COMPARISON: CT exam performed " April 20, 2012.    TECHNIQUE: Color Doppler and spectral waveform analysis obtained  throughout the abdominal aorta.    COMPARISON: No previous ultrasound exams, though CT exam on April 20, 2012 was reviewed.    FINDINGS: The proximal abdominal aorta is 2.6 cm AP x 2.1 cm  transverse. The mid abdominal aorta is 2.3 x 2.3 cm. The distal  abdominal aorta is 4.1 cm AP x 4.8 cm transverse x 5.6 cm in length,  on previous CT exam measuring 3.4 cm AP x 3.5 cm transverse. The right  common iliac artery is 1.3 x 1.3 cm and the left is 1.3 x 1.2 cm. Both  internal iliac arteries are patent. The right external iliac artery is  1 x 0.9 cm and the left external iliac artery is 1 x 0.9 cm.      Impression    IMPRESSION:  1. Infrarenal abdominal aortic aneurysm measuring 4.1 cm AP x 4.8 cm  transverse x 5.6 cm in length. Previously, the aneurysm measured 3.4 x  3.5 cm on CT exam on April 20, 2012. Consider six-month followup.  2. Ectatic bilateral common iliac arteries.    CRISTOBAL HUGHES MD       ASSESSMENT/PLAN:     Tobacco Cessation:   reports that he has been smoking Cigarettes.  He has a 15.00 pack-year smoking history. He has never used smokeless tobacco.  Tobacco Cessation Action Plan: Information offered: Patient not interested at this time        ICD-10-CM    1. Peripheral vascular disease of lower extremity with ulceration (H) I73.9 Lipid panel reflex to direct LDL Fasting    L97.909 Comprehensive metabolic panel     Albumin Random Urine Quantitative with Creat Ratio   2. Hypertension goal BP (blood pressure) < 130/80 I10 amLODIPine (NORVASC) 5 MG tablet     DISCONTINUED: amLODIPine (NORVASC) 5 MG tablet   3. Open wound of heel, right, initial encounter S91.301A    4. Type 2 diabetes mellitus with diabetic neuropathy, without long-term current use of insulin (H) E11.40 Lipid panel reflex to direct LDL Fasting     Comprehensive metabolic panel     Albumin Random Urine Quantitative with Creat Ratio   5. CKD (chronic  kidney disease) stage 4, GFR 15-29 ml/min (H) N18.4 amLODIPine (NORVASC) 5 MG tablet     Lipid panel reflex to direct LDL Fasting     Comprehensive metabolic panel     Albumin Random Urine Quantitative with Creat Ratio     DISCONTINUED: amLODIPine (NORVASC) 5 MG tablet   6. Tobacco use disorder F17.200    7. Need for prophylactic vaccination and inoculation against influenza Z23 FLU VACCINE, INCREASED ANTIGEN, PRESV FREE, AGE 65+ [95764]     Vaccine Administration, Initial [15120]     1, 3.  Patient tolerated his new medications well and has had improvement of his blood pressure, but he indicates that he does not want to continue these.  I explored reasons why he did not want to do this for quite some time, but due to his mental status changes after his stroke, I was unable to clearly elucidate the reason why.  His wife is unable to give me any clarity on this either.  Specifically, he wants to stop taking his Crestor and amlodipine.  I had an extensive discussion with him about if he does not want to treat his conditions, that would be reasonable.  However if this is what he wants, then we should consider stopping more of his medications.  If it is cost, there are probably some things we can do to reduce the cost.  If it is pill burden, there are things we can do to reduce the pill burden.  I was unable to clarify which of these obstacles might have been contributing definitively to his hesitancy.  I also had a very long discussion about how I did not feel it was appropriate to only partially treat his blood pressure as this was probably his biggest risk for another stroke, worsening peripheral vascular disease, or other vascular complications.  I strongly recommended that he consider continuing the amlodipine and actually increasing the dose to get better blood pressure control.  Per their request, I did check labs today to see how effective the Crestor was.  I recommended smoking cessation, but patient is also  not interested in that.  Follow-up in a few months.  2.  Improved but not controlled.  Recommended continued amlodipine.  See note above.  If patient chooses to treat this, would recommend recheck in a few weeks and follow-up in a few months.  4.  Currently well controlled.  If patient is interested in reducing his pill burden, can take 1 less glipizide per day.  5.  Encouraged efforts at risk factor reduction, the patient is very hesitant to taking anything more than what he has been taking previously.  Will check monitoring labs.  6.  Encourage smoking cessation.  Patient has not yet interested.  7.  Immunized.    Portions of this note were completed using Dragon dictation software.  Although reviewed, there may be typographical and other inadvertent errors that remain.         Patient Instructions   Thank you for visiting [facility]    I strongly recommend increasing amlodipine to 5 mg daily to help with blood pressure.      Your blood pressure was high today.  Please come back in 1-4 weeks for a nurse visit blood pressure check.     To keep the number of pills down, can cut glipizide to once daily.  You could also stop the pantoprazole.    I'd still recommend quitting smoking and continuing the Crestor, but I won't fight these battles today.    Please Follow Up when indicated on the section below this one on your After-Visit Summary.      If you had imaging scheduled please refer to your radiology prep sheet.    Appointment    Date_______________     Time_____________    Day:   M TU W TH F    With____________________________    Location_________________________    If you need medication refills, please contact your pharmacy 3 days before your prescriptions runs out. If you are out of refills, your pharmacy will contact contact the clinic.    Contact us or return if questions or concerns.     -Your Care Team:  MD Danielle Padron PA-C Joel De Haan, PA-C Elizabeth  NICA Stauffer CNP    General information about your clinic      Clinic hours:     Lab hours:  Phone [phone]  Monday 7:30 am-7 pm    Monday 8:30 am-6:30 pm  Tuesday-Friday 7:30 am-5 pm   Tuesday-Friday 8:30 am-4:30 pm    Pharmacy hours:  Phone [phone]  Monday 8:30 am-7pm  Tuesday-Friday 8:30am-6 pm                                       Mychart assistance [phone]        We would like to hear from you, how was your visit today?    Arlette Hill  Patient Information Supervisor   Patient Care Supervisor  [facility], and \[facility] [place] [facility]\"", and [facility]  [phone]         Corky Yang MD, MD  [employer]

## 2018-10-11 NOTE — MR AVS SNAPSHOT
After Visit Summary   10/11/2018    Aston Linn    MRN: [id number]           Patient Information     Date Of Birth          9/21/1926        Visit Information        Provider Department      10/11/2018 11:00 AM Ernie Lopez DPM [facility]        Today's Diagnoses     Peripheral vascular disease (H)    -  1    Ulcer of right heel and midfoot, limited to breakdown of skin (H)        Type 2 diabetes mellitus with diabetic nephropathy, without long-term current use of insulin (H)          Care Instructions    Follow-up in 2 months or as needed.          Follow-ups after your visit        Additional Services     WOUND CARE REFERRAL       Arterial insufficiency and patient refuses revascularization and still pain.  He is wearing DM shoes and cushioning it. Not very active.  He puts Cortizone cream on, instructed to stop and maybe hydrogel would provide a little comfort.                  Your next 10 appointments already scheduled     Oct 11, 2018  2:00 PM CDT   Office Visit with Corky Yang MD   [facility]    [id number] [place] [address] [phone]           Bring a current list of meds and any records pertaining to this visit. For Physicals, please bring immunization records and any forms needing to be filled out. Please arrive 10 minutes early to complete paperwork.            Oct 15, 2018 10:00 AM CDT   New Visit with  WOUND EXAM ROOM   [facility]    [address] [place] [id number] [phone]              Who to contact     If you have questions or need follow up information about today's clinic visit or your schedule please contact [facility] directly at [phone].  Normal or non-critical lab and imaging results will be communicated to you by MyChart, letter or phone within 4 business days after the clinic has  "received the results. If you do not hear from us within 7 days, please contact the clinic through [employer] or phone. If you have a critical or abnormal lab result, we will notify you by phone as soon as possible.  Submit refill requests through [employer] or call your pharmacy and they will forward the refill request to us. Please allow 3 business days for your refill to be completed.          Additional Information About Your Visit        Care EveryWhere ID     This is your Care EveryWhere ID. This could be used by other organizations to access your [facility] medical records  [id number]        Your Vitals Were     Temperature Height BMI (Body Mass Index)             97.4  F (36.3  C) (Temporal) 5' 11\" (1.803 m) 29.43 kg/m2          Blood Pressure from Last 3 Encounters:   10/11/18 160/82   09/14/18 191/81   09/06/18 156/70    Weight from Last 3 Encounters:   10/11/18 211 lb (95.7 kg)   08/23/18 211 lb (95.7 kg)   08/13/18 211 lb (95.7 kg)              We Performed the Following     WOUND CARE REFERRAL        Primary Care Provider Office Phone # Fax #    Corky Yang [phone] [address] [place] [address]        Equal Access to Services     [facility] : Hadii aad ku hadasho Soomaali, waaxda luqadaha, qaybta kaalmada adeegyada, waxay connorin obie hill. So [facility] [phone].    ATENCIÓN: Si habla español, tiene a martinez disposición servicios gratuitos de asistencia lingüística. Llame al [phone].    We comply with applicable federal civil rights laws and [place] laws. We do not discriminate on the basis of race, color, national origin, age, disability, sex, sexual orientation, or gender identity.            Thank you!     Thank you for choosing [facility]  for your care. Our goal is always to provide you with excellent care. Hearing back from our patients is one way we can continue to improve our services. Please take a few minutes to " complete the written survey that you may receive in the mail after your visit with us. Thank you!             Your Updated Medication List - Protect others around you: Learn how to safely use, store and throw away your medicines at www.disposemymeds.org.          This list is accurate as of 10/11/18 12:44 PM.  Always use your most recent med list.                   Brand Name Dispense Instructions for use Diagnosis    ACCU-CHEK BAL PLUS test strip   Generic drug:  blood glucose monitoring     100 each    USE TO TEST BLOOD SUGARS 1-3 TIMES DAILY AS DIRECTED.    Diabetic polyneuropathy associated with type 2 diabetes mellitus (H)       albuterol 108 (90 Base) MCG/ACT inhaler    PROAIR HFA/PROVENTIL HFA/VENTOLIN HFA    1 Inhaler    Inhale 2 puffs into the lungs every 6 hours as needed for shortness of breath / dyspnea or wheezing    Chronic obstructive pulmonary disease, unspecified COPD type (H)       amLODIPine 2.5 MG tablet    NORVASC    30 tablet    Take 1 tablet (2.5 mg) by mouth daily    Hypertension goal BP (blood pressure) < 130/80, CKD (chronic kidney disease) stage 4, GFR 15-29 ml/min (H)       ASPIRIN NOT PRESCRIBED    INTENTIONAL    0 each    1 each continuous prn for other Antiplatelet medication not prescribed intentionally due to Uncontrolled hypertension (systolic > 180, diastolic > 100) and Current thienopryridine therapy    Diabetic polyneuropathy associated with type 2 diabetes mellitus (H)       clorazepate 3.75 MG tablet    TRANXENE-T    30 tablet    Take 1 tablet (3.75 mg) by mouth nightly as needed    Psychophysiological insomnia       gabapentin 300 MG capsule    NEURONTIN    90 capsule    Take 1 capsule (300 mg) by mouth 2 times daily as needed    Diabetic polyneuropathy associated with type 2 diabetes mellitus (H)       glipiZIDE 10 MG tablet    GLUCOTROL    180 tablet    Take 1 tablet by mouth 2 times daily (before meals).        hydrochlorothiazide 25 MG tablet    HYDRODIURIL    90 tablet     Take 1 tablet (25 mg) by mouth daily    CKD (chronic kidney disease) stage 3, GFR 30-59 ml/min (H), Hypertension goal BP (blood pressure) < 130/80       HYDROcodone-acetaminophen 5-325 MG per tablet    NORCO    18 tablet    Take 1 tablet by mouth every 4 hours as needed for pain        LORazepam 0.5 MG tablet    ATIVAN    20 tablet    Take 1 tablet (0.5 mg) by mouth nightly as needed for anxiety    Anxiety state, unspecified       losartan 100 MG tablet    COZAAR    30 tablet    Take 0.5 tablets (50 mg) by mouth 2 times daily    Type 2 diabetes, HbA1C goal < 8% (H), Hypertension, goal below 140/90       Olodaterol HCl 2.5 MCG/ACT Aers      Inhale 1 puff into the lungs        pantoprazole 40 MG EC tablet    PROTONIX    90 tablet    TAKE ONE TABLET BY MOUTH ONCE DAILY    Gastroesophageal reflux disease without esophagitis       PLAVIX 75 MG tablet   Generic drug:  clopidogrel      1 TABLET DAILY        rosuvastatin 5 MG tablet    CRESTOR    30 tablet    Take 0.5 tablets (2.5 mg) by mouth daily    PAD (peripheral artery disease) (H), Peripheral vascular disease of lower extremity with ulceration (H), Hyperlipidemia LDL goal <100, CKD (chronic kidney disease) stage 4, GFR 15-29 ml/min (H)       STATIN NOT PRESCRIBED (INTENTIONAL)     0 each    Statin not prescribed intentionally due to Intolerance    Type 2 diabetes, HbA1C goal < 8% (H)

## 2018-10-11 NOTE — PROGRESS NOTES
Prior to injection verified patient identity using patient's name and date of birth.  Due to injection administration, patient instructed to remain in clinic for 15 minutes  afterwards, and to report any adverse reaction to me immediately.    Injectable Influenza Immunization Documentation    1.  Is the person to be vaccinated sick today?   No    2. Does the person to be vaccinated have an allergy to a component   of the vaccine?   No  Egg Allergy Algorithm Link    3. Has the person to be vaccinated ever had a serious reaction   to influenza vaccine in the past?   No    4. Has the person to be vaccinated ever had Guillain-Barré syndrome?   No    Form completed by SMA Roslyn

## 2018-10-11 NOTE — MR AVS SNAPSHOT
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      Your blood pressure was high today.  Please come back in 1-4 weeks for a nurse visit blood pressure check.     To keep the number of pills down, can cut glipizide to once daily.  You could also stop the pantoprazole.    I'd still recommend quitting smoking and continuing the Crestor, but I won't fight these battles today.    Please Follow Up when indicated on the section below this one on your After-Visit Summary.      If you had imaging scheduled please refer to your radiology prep sheet.    Appointment    Date_______________     Time_____________    Day:   M TU W TH F    With____________________________    Location_________________________    If you need medication refills, please contact your pharmacy 3 days before your prescriptions runs out. If you are out of refills, your pharmacy will contact contact the clinic.    Contact us or return if questions or concerns.     -Your Care Team:  MD Danielle Padron PA-C Joel De Haan, PA-C Elizabeth McLean, NICA ACEVEDO    General information about your clinic      Clinic hours:     Lab hours:  Phone [phone]  Monday 7:30 am-7 pm    Monday 8:30 am-6:30  pm  Tuesday-Friday 7:30 am-5 pm   Tuesday-Friday 8:30 am-4:30 pm    Pharmacy hours:  Phone [phone]  Monday 8:30 am-7pm  Tuesday-Friday 8:30am-6 pm                                       Susie assistance [phone]        We would like to hear from you, how was your visit today?    Arlette Hill  Patient Information Supervisor   Patient Care Supervisor  [facility] Devin [place], and [facility] Devin [place], and [facility]  [phone]             Follow-ups after your visit        Follow-up notes from your care team     Return in about 6 months (around 4/11/2019) for Recheck.      Your next 10 appointments already scheduled     Oct 15, 2018 10:00 AM CDT   New Visit with  WOUND EXAM ROOM   [facility] ([address] [address] [phone]              Who to contact     If you have questions or need follow up information about today's clinic visit or your schedule please contact [facility] directly at [phone].  Normal or non-critical lab and imaging results will be communicated to you by MyChart, letter or phone within 4 business days after the clinic has received the results. If you do not hear from us within 7 days, please contact the clinic through [employer] or phone. If you have a critical or abnormal lab result, we will notify you by phone as soon as possible.  Submit refill requests through [employer] or call your pharmacy and they will forward the refill request to us. Please allow 3 business days for your refill to be completed.          Additional Information About Your Visit        Care EveryWhere ID     This is your Care EveryWhere ID. This could be used by other organizations to access your [place] medical records  [id number]        Your Vitals Were     Pulse Temperature Respirations Pulse Oximetry          90 96.8  F (36  C) (Temporal) 20 92%          Blood Pressure from Last 3 Encounters:   10/11/18 158/70   10/11/18 160/82   09/14/18 191/81    Weight from Last 3 Encounters:   10/11/18 211 lb (95.7 kg)   08/23/18 211 lb (95.7 kg)   08/13/18 211 lb (95.7 kg)              Today, you had the following     No orders found for display         Today's Medication Changes          These changes are accurate as of 10/11/18  3:09 PM.  If you have any questions, ask your nurse or doctor.               Start taking these medicines.        Dose/Directions    amLODIPine 5 MG tablet   Commonly known as:  NORVASC   Used for:  Hypertension goal BP (blood pressure) < 130/80, CKD (chronic kidney disease) stage 4, GFR 15-29 ml/min (H)   Started by:  Corky Yang MD        Dose:  5 mg   Take 1 tablet (5 mg) by mouth daily   Quantity:  90 tablet   Refills:  1         These medicines have changed or have updated prescriptions.        Dose/Directions    glipiZIDE 10 MG tablet   Commonly known as:  GLUCOTROL   This may have changed:  when to take this   Changed by:  Corky Yang MD        Dose:  10 mg   Take 1 tablet (10 mg) by mouth daily   Quantity:  1 tablet   Refills:  1            Where to get your medicines      These medications were sent to [facility] [place] - [address]  [address] [place] [address]     Phone:  [phone]     amLODIPine 5 MG tablet                Primary Care Provider Office Phone # Fax #    Corky Yang [phone] [address] [place] [address]        Equal Access to Services     [facility] AH: Hadii aad ku hadasho Soomaali, waaxda luqadaha, qaybta kaalmada adeegjemima, katt idiin hayaan adeeg kharash la'aan . So [facility] [phone].    ATENCIÓN: Si habla español, tiene a martinez disposición servicios gratuitos de asistencia lingüística. Llame al [phone].    We comply with applicable federal civil rights laws and [place] laws. We do not discriminate on the basis  of race, color, national origin, age, disability, sex, sexual orientation, or gender identity.            Thank you!     Thank you for choosing [facility]  for your care. Our goal is always to provide you with excellent care. Hearing back from our patients is one way we can continue to improve our services. Please take a few minutes to complete the written survey that you may receive in the mail after your visit with us. Thank you!             Your Updated Medication List - Protect others around you: Learn how to safely use, store and throw away your medicines at www.disposemymeds.org.          This list is accurate as of 10/11/18  3:09 PM.  Always use your most recent med list.                   Brand Name Dispense Instructions for use Diagnosis    ACCU-CHEK BAL PLUS test strip   Generic drug:  blood glucose monitoring     100 each    USE TO TEST BLOOD SUGARS 1-3 TIMES DAILY AS DIRECTED.    Diabetic polyneuropathy associated with type 2 diabetes mellitus (H)       albuterol 108 (90 Base) MCG/ACT inhaler    PROAIR HFA/PROVENTIL HFA/VENTOLIN HFA    1 Inhaler    Inhale 2 puffs into the lungs every 6 hours as needed for shortness of breath / dyspnea or wheezing    Chronic obstructive pulmonary disease, unspecified COPD type (H)       amLODIPine 5 MG tablet    NORVASC    90 tablet    Take 1 tablet (5 mg) by mouth daily    Hypertension goal BP (blood pressure) < 130/80, CKD (chronic kidney disease) stage 4, GFR 15-29 ml/min (H)       ASPIRIN NOT PRESCRIBED    INTENTIONAL    0 each    1 each continuous prn for other Antiplatelet medication not prescribed intentionally due to Uncontrolled hypertension (systolic > 180, diastolic > 100) and Current thienopryridine therapy    Diabetic polyneuropathy associated with type 2 diabetes mellitus (H)       glipiZIDE 10 MG tablet    GLUCOTROL    1 tablet    Take 1 tablet (10 mg) by mouth daily        hydrochlorothiazide 25 MG tablet    HYDRODIURIL    90 tablet     Take 1 tablet (25 mg) by mouth daily    CKD (chronic kidney disease) stage 3, GFR 30-59 ml/min (H), Hypertension goal BP (blood pressure) < 130/80       losartan 100 MG tablet    COZAAR    30 tablet    Take 0.5 tablets (50 mg) by mouth 2 times daily    Type 2 diabetes, HbA1C goal < 8% (H), Hypertension, goal below 140/90       Olodaterol HCl 2.5 MCG/ACT Aers      Inhale 1 puff into the lungs        pantoprazole 40 MG EC tablet    PROTONIX    90 tablet    TAKE ONE TABLET BY MOUTH ONCE DAILY    Gastroesophageal reflux disease without esophagitis       PLAVIX 75 MG tablet   Generic drug:  clopidogrel      1 TABLET DAILY        STATIN NOT PRESCRIBED (INTENTIONAL)     0 each    Statin not prescribed intentionally due to Intolerance    Type 2 diabetes, HbA1C goal < 8% (H)

## 2018-10-11 NOTE — PROGRESS NOTES
"Chief Complaint   Patient presents with     WOUND CARE     pain 5 - DM ulcer Right heel, onset early Aug 2018; LOV 8/23/2018     Diabetes     type 2     other     9/14/2018 - vascular consult       8/13/2018 - NEW ISSUE TODAY - 8/9/2018 - ED follow up  HPI:  1. DM Type 2     2. Onset early Aug 2018, DM ulcer posterior Right heel. No injury noted.  Constant, sharp, stabbing, burning, numbness, tingling, redness, pain 9.  He has had vascular consultation and he would not rather not pursue any intervention to improve blood flow.    BMI is normal.    Patient to follow up with Primary Care provider regarding elevated blood pressure.    EXAM:Vitals: /82 (BP Location: Left arm, Cuff Size: Adult Regular)  Temp 97.4  F (36.3  C) (Temporal)  Ht 5' 11\" (1.803 m)  Wt 211 lb (95.7 kg)  BMI 29.43 kg/m2  BMI= Body mass index is 29.43 kg/(m^2).    General appearance: Patient is alert and fully cooperative with history & exam.  No sign of distress is noted during the visit.     Psychiatric: Affect is pleasant & appropriate.  Patient appears motivated to improve health.     Respiratory: Breathing is regular & unlabored while sitting.     HEENT: Hearing is intact to spoken word.  Speech is clear.  No gross evidence of visual impairment that would impact ambulation.     Vascular: DP 0/4 & PT 0/4 left & right.  CFT delayed with dependent rubor noted about the digits.  Diminished hair growth distal to mid tibia and no hair about the foot and toes.  Temperature changes noted, warm to cool proximal to distal.  Hemosiderin pigmentation noted with multiple varicosities legs and feet bilateral. Generalized edema bilateral legs and feet.  Pt denies claudication history.    Dependent rubor noted on the right foot but not the left     Neurologic: Normal plantar response bilateral.  Loss of protective threshold plus 2/10 applications of a 5.07 monofilament.  Pt admits burning and paraesthesias about the feet and toes with palpation.   "   Dermatologic: Diminished texture turgor and tone about the integument.  Skin is thin & shiny.  There is a full-thickness ulceration about the plantar distal aspect of the right calcaneus measuring about 1 cm.  It is exquisitely painful.  No abscess or erythema.     Musculoskeletal: Patient is ambulatory without assistive device or brace.  There is semi reducible contracture of the lesser digits.    Labs:  2014 ABIs of 0.83-0.87, biphasic digital waveforms.      Hemoglobin A1C (%)   Date Value   09/06/2018 6.3 (H)   04/09/2018 6.7 (H)   10/30/2017 6.3 (H)   04/20/2017 6.7 (H)   10/14/2016 6.6 (H)   04/20/2016 6.8   12/02/2015 6.8 (H)   09/10/2015 6.9 (H)     Creatinine (mg/dL)   Date Value   09/06/2018 2.39 (H)   04/09/2018 2.3 (H)   10/30/2017 2.51 (H)   04/20/2017 2.2 (H)   10/14/2016 2.47 (H)   04/20/2016 2.3       ASSESSMENT:       ICD-10-CM    1. Peripheral vascular disease (H) I73.9 WOUND CARE REFERRAL   2. Ulcer of right heel and midfoot, limited to breakdown of skin (H) L97.411 WOUND CARE REFERRAL   3. Type 2 diabetes mellitus with diabetic nephropathy, without long-term current use of insulin (H) E11.21 WOUND CARE REFERRAL        PLAN:    5/7/2018  All 10 nails were debrided with a nail nipper.   We discussed risk factors and preventive measures.    We discussed appropriate hygiene, shoe gear, daily foot exam, and reinforced management of weight, diet, activity goals and HA1C goal for diabetic patients.    Dispensed written foot care instructions.    All questions were answered to their satisfaction.    RTC once yearly or as needed with questions or concerns.      He obtains diabetic shoes elsewhere and does not wear them.  We reviewed risks associated with shoe gear and recommended follow-up once yearly for diabetic foot exam and evaluation of shoe gear.  Recommend he utilize his diabetic shoes.  We discussed loss of protective threshold and discussed how to obtain interdigital bathing more easily.  All  questions were answered in regards to this.    Recommended noninvasive vascular exams for better baseline evaluation of arterial inflow however the patient refuses today.  In 2014 patient had biphasic waveforms ABIs of 0.83-0.87.  He understands there are risks associated with this and that it should be evaluated and monitored.  We will continue to monitor this upon future visits.    8/13/2018  Dependent rubor noted about the digits of the right foot but not on the left  He started Augmentin from ED 8/9/18 and pt notices no change  This appears consistent with arterial insufficiency.   Order ABIs.  Last done 2014.  Follow-up after obtaining the ABIs.  If these are significantly abnormal I would recommend a vascular consult.     They also requested nail debridment.  I debrided all 10 nails manually and mechanically  Written instructions dispensed.     8/23/2018  recommend a vascular consultation with dry stable ulcer about the right calcaneus and ABIs demonstrating worsening and severe arterial insufficiency.    We discussed alternative treatment options and at this time he would like to move forward with further discovering his arterial inflow.  Follow-up with me as needed or after vascular consult    10/11/2018  He has had vascular consultation and patient is not willing to move forward vascular reconstruction at this time  Etiology of wound is arterial insufficiency.   No signs of current infection are noted at this time.  Referral to WOC RN for possibly wound care gel for some comfort.  He was instructed to stop hydrocortisone on the wound as this diminishes wound healing and raise his risk of infection  Follow up in a couple months or as needed if the wound worsens of becomes thick with callous or necrotic tissue.   He understands weightbearing pressure reduces wound healing however physical activity and increased heart rate would improve oxygenation to tissues that are poorly oxygenated.  He understands as long  as there is an opening through his skin he is at risk for infection which could very easily lead to gangrene, necrosis, loss of limb, sepsis or loss of life.    All questions were answered to his satisfaction as well as his wife was present today.  Follow-up with me if something changes otherwise follow-up with wound care for appropriate topical wound care and comfort.      Ernie Lopez DPM

## 2018-10-11 NOTE — LETTER
"    10/11/2018         RE: Aston Linn  [address] [facility] [address]        Dear Colleague,    Thank you for referring your patient, Aston Linn, to the [facility]. Please see a copy of my visit note below.    Chief Complaint   Patient presents with     WOUND CARE     pain 5 - DM ulcer Right heel, onset early Aug 2018; LOV 8/23/2018     Diabetes     type 2     other     9/14/2018 - vascular consult       8/13/2018 - NEW ISSUE TODAY - 8/9/2018 - ED follow up  HPI:  1. DM Type 2     2. Onset early Aug 2018, DM ulcer posterior Right heel. No injury noted.  Constant, sharp, stabbing, burning, numbness, tingling, redness, pain 9.  He has had vascular consultation and he would not rather not pursue any intervention to improve blood flow.    BMI is normal.    Patient to follow up with Primary Care provider regarding elevated blood pressure.    EXAM:Vitals: /82 (BP Location: Left arm, Cuff Size: Adult Regular)  Temp 97.4  F (36.3  C) (Temporal)  Ht 5' 11\" (1.803 m)  Wt 211 lb (95.7 kg)  BMI 29.43 kg/m2  BMI= Body mass index is 29.43 kg/(m^2).    General appearance: Patient is alert and fully cooperative with history & exam.  No sign of distress is noted during the visit.     Psychiatric: Affect is pleasant & appropriate.  Patient appears motivated to improve health.     Respiratory: Breathing is regular & unlabored while sitting.     HEENT: Hearing is intact to spoken word.  Speech is clear.  No gross evidence of visual impairment that would impact ambulation.     Vascular: DP 0/4 & PT 0/4 left & right.  CFT delayed with dependent rubor noted about the digits.  Diminished hair growth distal to mid tibia and no hair about the foot and toes.  Temperature changes noted, warm to cool proximal to distal.  Hemosiderin pigmentation noted with multiple varicosities legs and feet bilateral. Generalized edema bilateral legs and feet.  Pt denies claudication history.    Dependent " rubor noted on the right foot but not the left     Neurologic: Normal plantar response bilateral.  Loss of protective threshold plus 2/10 applications of a 5.07 monofilament.  Pt admits burning and paraesthesias about the feet and toes with palpation.     Dermatologic: Diminished texture turgor and tone about the integument.  Skin is thin & shiny.  There is a full-thickness ulceration about the plantar distal aspect of the right calcaneus measuring about 1 cm.  It is exquisitely painful.  No abscess or erythema.     Musculoskeletal: Patient is ambulatory without assistive device or brace.  There is semi reducible contracture of the lesser digits.    Labs:  2014 ABIs of 0.83-0.87, biphasic digital waveforms.      Hemoglobin A1C (%)   Date Value   09/06/2018 6.3 (H)   04/09/2018 6.7 (H)   10/30/2017 6.3 (H)   04/20/2017 6.7 (H)   10/14/2016 6.6 (H)   04/20/2016 6.8   12/02/2015 6.8 (H)   09/10/2015 6.9 (H)     Creatinine (mg/dL)   Date Value   09/06/2018 2.39 (H)   04/09/2018 2.3 (H)   10/30/2017 2.51 (H)   04/20/2017 2.2 (H)   10/14/2016 2.47 (H)   04/20/2016 2.3       ASSESSMENT:       ICD-10-CM    1. Peripheral vascular disease (H) I73.9 WOUND CARE REFERRAL   2. Ulcer of right heel and midfoot, limited to breakdown of skin (H) L97.411 WOUND CARE REFERRAL   3. Type 2 diabetes mellitus with diabetic nephropathy, without long-term current use of insulin (H) E11.21 WOUND CARE REFERRAL        PLAN:    5/7/2018  All 10 nails were debrided with a nail nipper.   We discussed risk factors and preventive measures.    We discussed appropriate hygiene, shoe gear, daily foot exam, and reinforced management of weight, diet, activity goals and HA1C goal for diabetic patients.    Dispensed written foot care instructions.    All questions were answered to their satisfaction.    RTC once yearly or as needed with questions or concerns.      He obtains diabetic shoes elsewhere and does not wear them.  We reviewed risks associated with  shoe gear and recommended follow-up once yearly for diabetic foot exam and evaluation of shoe gear.  Recommend he utilize his diabetic shoes.  We discussed loss of protective threshold and discussed how to obtain interdigital bathing more easily.  All questions were answered in regards to this.    Recommended noninvasive vascular exams for better baseline evaluation of arterial inflow however the patient refuses today.  In 2014 patient had biphasic waveforms ABIs of 0.83-0.87.  He understands there are risks associated with this and that it should be evaluated and monitored.  We will continue to monitor this upon future visits.    8/13/2018  Dependent rubor noted about the digits of the right foot but not on the left  He started Augmentin from ED 8/9/18 and pt notices no change  This appears consistent with arterial insufficiency.   Order ABIs.  Last done 2014.  Follow-up after obtaining the ABIs.  If these are significantly abnormal I would recommend a vascular consult.     They also requested nail debridment.  I debrided all 10 nails manually and mechanically  Written instructions dispensed.     8/23/2018  recommend a vascular consultation with dry stable ulcer about the right calcaneus and ABIs demonstrating worsening and severe arterial insufficiency.    We discussed alternative treatment options and at this time he would like to move forward with further discovering his arterial inflow.  Follow-up with me as needed or after vascular consult    10/11/2018  He has had vascular consultation and patient is not willing to move forward vascular reconstruction at this time  Etiology of wound is arterial insufficiency.   No signs of current infection are noted at this time.  Referral to WOC RN for possibly wound care gel for some comfort.  He was instructed to stop hydrocortisone on the wound as this diminishes wound healing and raise his risk of infection  Follow up in a couple months or as needed if the wound worsens  of becomes thick with callous or necrotic tissue.   He understands weightbearing pressure reduces wound healing however physical activity and increased heart rate would improve oxygenation to tissues that are poorly oxygenated.  He understands as long as there is an opening through his skin he is at risk for infection which could very easily lead to gangrene, necrosis, loss of limb, sepsis or loss of life.    All questions were answered to his satisfaction as well as his wife was present today.  Follow-up with me if something changes otherwise follow-up with wound care for appropriate topical wound care and comfort.      Ernie Lopez DPM              Again, thank you for allowing me to participate in the care of your patient.        Sincerely,        Ernie Lopez DPM

## 2018-10-11 NOTE — PATIENT INSTRUCTIONS
Thank you for visiting [facility]    I strongly recommend increasing amlodipine to 5 mg daily to help with blood pressure.      Your blood pressure was high today.  Please come back in 1-4 weeks for a nurse visit blood pressure check.     To keep the number of pills down, can cut glipizide to once daily.  You could also stop the pantoprazole.    I'd still recommend quitting smoking and continuing the Crestor, but I won't fight these battles today.    Please Follow Up when indicated on the section below this one on your After-Visit Summary.      If you had imaging scheduled please refer to your radiology prep sheet.    Appointment    Date_______________     Time_____________    Day:   M TU W TH F    With____________________________    Location_________________________    If you need medication refills, please contact your pharmacy 3 days before your prescriptions runs out. If you are out of refills, your pharmacy will contact contact the clinic.    Contact us or return if questions or concerns.     -Your Care Team:  MD Danielle Padron PA-C Joel De Haan, PA-C Elizabeth McLean, APRN CNP    General information about your clinic      Clinic hours:     Lab hours:  Phone [phone]  Monday 7:30 am-7 pm    Monday 8:30 am-6:30 pm  Tuesday-Friday 7:30 am-5 pm   Tuesday-Friday 8:30 am-4:30 pm    Pharmacy hours:  Phone [phone]  Monday 8:30 am-7pm  Tuesday-Friday 8:30am-6 pm                                       Mychart assistance [phone]        We would like to hear from you, how was your visit today?    Arlette Hill  Patient Information Supervisor   Patient Care Supervisor  [facility], and [facility], and [facility]  [phone]

## 2018-10-12 NOTE — PROGRESS NOTES
Cholesterol is much better on medication.  I would recommend continued use if desired.  There is protein in the urine indicating slight worsening of his kidney function.  This is likely related to his poor blood pressure control.  I strongly recommend continued efforts at blood pressure control including medications.  Other labs are stable.  Increased fluid intake would also probably be helpful.    Have a nice day!    Dr. Yang

## 2018-10-12 NOTE — TELEPHONE ENCOUNTER
Spoke to patient message given. No questions at this time.  Kiara Do CMA ([facility])    Notes Recorded by Corky Yang MD on 10/12/2018 at 9:07 AM  Cholesterol is much better on medication.  I would recommend continued use if desired.  There is protein in the urine indicating slight worsening of his kidney function.  This is likely related to his poor blood pressure control.  I strongly recommend continued efforts at blood pressure control including medications.  Other labs are stable.  Increased fluid intake would also probably be helpful.    Have a nice day!

## 2018-10-15 NOTE — IP AVS SNAPSHOT
[address] [address]    Phone:  [phone]    Fax:  [phone]                                       After Visit Summary   10/15/2018    Aston Linn    MRN: [id number]           After Visit Summary Signature Page     I have received my discharge instructions, and my questions have been answered. I have discussed any challenges I see with this plan with the nurse or doctor.    ..........................................................................................................................................  Patient/Patient Representative Signature      ..........................................................................................................................................  Patient Representative Print Name and Relationship to Patient    ..................................................               ................................................  Date                                   Time    ..........................................................................................................................................  Reviewed by Signature/Title    ...................................................              ..............................................  Date                                               Time          22EPIC Rev 08/18

## 2018-10-15 NOTE — DISCHARGE INSTRUCTIONS
Today the wound on the right heel appears to be mostly clean but is a significant sore.  It will be slow to heal but we will try to keep the risk of bacteria and infection low.    New wound cares are as follows.  1 Clean with soap and water rinse with water and dry well.  2 Apply the Iodosorb paste to the wound, use the stick of a Q tip to remove the excess.  3 Cover with a large band aid.  4 And change the dressing daily.    I will see you again in 4 weeks to re evaluate the sore.  I am here on Mondays and Thursdays.  If you have any concerns before your re visit call [phone] and the scheduling department will address you needs.    Thanks for coming in today.    Micheal Hammond RN [email or web address]

## 2018-10-15 NOTE — PROGRESS NOTES
Reason For Visit: Aston Linn, 89 year old male, seen as outpatient to evaluate and treat a right posterior heel diabetic ulcer. Referred by Dr Lopez. Patient presents by himself  today.      History: Pt with medical history that includes, Diabetes type 2, PVD who noted a new ulcer on his right posterior heel in early August of this year 2018 that was not associated with any trauma or injury.  He had increasing pain and was seen in the ED on 8/9/18.  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 Pt was seen by Dr Lopez DPM on 10/11/18 and referred to the wound and ostomy clinic for wound recommendations.  At this time the pt is not interested in any interventions to improve arterial blood flow to the LE's.     Personal/social history: Pt lives alone independently    Objective:   Physical appearance: alert and oriented  Ambulation: independent  Current treatment plan: keeping the wound covered with band aid  Last changed: yesterday    Wound #1 Right posterior heel, mixed arterial and diabetic ulcer.  Stage/tissue depth: full thickness  0.7 cm L x 0.5 cm W x 0.2 cm D  Tunneling: no  Undermining: no  Wound bed type/amount: approximately 80 % red nongranular tissue and 20 % yellow slough; NA fluctuant  Wound Edges: open with soft callusing  Periwound: hemosiderin staining and nonpitting edema  Drainage: small amount  Odor: no  Pain: yes 8/10 with any direct touch or pressure, sharp constant stabbing and burning pain.    Dorsalis Pedal Pulse: is not palpable: NA doppler: NA phasic  Hair growth: none noted below the knees  Capillary Refill: delayed approximately 5 seconds  Feet/toes color: dependent rubor  Nails: thick and brittle  R Leg: Edema nonpitting edema. Ankle circumference Not assessed this visit cm. Calf circumference NA  cm.  L Leg: Edema NA. Ankle circumference NA cm. Calf circumference NA cm.    Mobility: slow but appears normal  Current offloading/footwear: regular shoes, has diabetic shoes but does not wear  Sensation: peripheral neuropathy bilateral feet  HgbA1C: 6.3 Date: 9/6/18  Checks Blood Glucose?:  Does not check blood sugars in the home Average Readings: NA  Other callousing/areas of concern: soft callusing and dry skin around the edges of the feet    Diet: Regular  Smoking: is a current cigarette smoker    Discussed: etiology of wound (diabetic ulcer/arterial ulcer), pathophysiology and patient specific goals for wound healing.   Education: On role of woc nurse in the wound care team, wound status, rational for use of new products in wound, role of new products in wound healing, plans for follow up and signs of infection to monitor and report to MD.     Assessment:  Today the wound on the right heel appears to be mostly clean but is a significant sore.  Due to the arterial insuffiencey the wound at best will be slow to heal and pt will be a risk for infection as long as it is open.     Barriers to wound healing:   Poor nutrition: inadequate supply of protein, carbohydrates, fatty acids, and trace elements essential for all phases of wound healing.  Did teaching on need for protein in diet for healing and need to limit salt and control blood sugars  Reduced Blood Supply: inadequate perfusion to heal wound, present due to severe arterial insuffiencey in right LE.  Medication: NA  Chemotherapy: suppresses the immune system and inflammatory response, NA  Radiotherapy: increases production of free radical which damage cells, NA  Psychological stress: NA  Obesity: decreases tissue perfusion  Infection: prolongs inflammatory phase, uses vital nutrients, impairs epithelialization and releases toxins, none currently noted, started antimicrobial primary dressing today.  Underlying Disease: diabetes mellitis and autoimmune  disorders, present but fairly well controlled.  Maceration: reduces wound tensile strength and inhibits epithelial migration, None noted but will continue to monitor.  Patient compliance, appears motivated to control the wounds status and improve as body allows.  Unrelieved pressure, NA  Immobility, NA  Substance abuse: NA    Plan:  New wound cares are as follows.  1 Clean with soap and water rinse with water and dry well.  2 Apply the Iodosorb paste to the wound, use the stick of a Q tip to remove the excess.  3 Cover with a large band aid.  4 And change the dressing daily.    Topical care: Iodosorb  Offloading: try to limit time on foot and pressure to heel  Additional recommendations: none at this time    Wound Care: Wound cleansed with Microklenz and gauze, patted dry. Periwound protected with NA. Wound base filled with Iodosorb paste. Covered with large band aid, followed by NA. Secured with NA. To be changed daily.    Discussed plan of care with patient. Teaching done with patient for dressing changes; he is able and willing to perform.    The following discharge instructions were reviewed with and sent home with the patient: See discharge instructions.    The following supplies were sent home with the patient:  Remains of the tube of Iodosorb opened but not used up today.  Will reassess care plan in 4 weeks and order patient supplies as needed    Return visit: 11/12/18 Monday    Verbal, written, & demonstrative education provided.  Face to face time (excluding procedure): approximately 25 minutes.  Procedure: NA  Care plan was changed.    [phone]

## 2018-10-15 NOTE — IP AVS SNAPSHOT
MRN:[id number]                      After Visit Summary   10/15/2018    Aston Linn    MRN: [id number]           Visit Information        Provider Department      10/15/2018 10:00 AM PH WOUND EXAM ROOM [facility]           Review of your medicines      UNREVIEWED medicines. 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)        Dose:  5 mg   Take 1 tablet (5 mg) by mouth daily   Quantity:  90 tablet   Refills:  1       ASPIRIN NOT PRESCRIBED   Commonly known as:  INTENTIONAL   Used for:  Diabetic polyneuropathy associated with type 2 diabetes mellitus (H)        Dose:  1 each   1 each continuous prn for other Antiplatelet medication not prescribed intentionally due to Uncontrolled hypertension (systolic > 180, diastolic > 100) and Current thienopryridine therapy   Quantity:  0 each   Refills:  0       glipiZIDE 10 MG tablet   Commonly known as:  GLUCOTROL        Dose:  10 mg   Take 1 tablet (10 mg) by mouth daily   Quantity:  1 tablet   Refills:  1       hydrochlorothiazide 25 MG tablet   Commonly known as:  HYDRODIURIL   Used for:  CKD (chronic kidney disease) stage 3, GFR 30-59 ml/min (H), Hypertension goal BP (blood pressure) < 130/80        Dose:  25 mg   Take 1 tablet (25 mg) by mouth daily   Quantity:  90 tablet   Refills:  1       losartan 100 MG tablet   Commonly known as:  COZAAR   Used for:  Type 2 diabetes, HbA1C goal < 8% (H),  Hypertension, goal below 140/90        Dose:  50 mg   Take 0.5 tablets (50 mg) by mouth 2 times daily   Quantity:  30 tablet   Refills:  1       Olodaterol HCl 2.5 MCG/ACT Aers        Dose:  1 puff   Inhale 1 puff into the lungs   Refills:  0       pantoprazole 40 MG EC tablet   Commonly known as:  PROTONIX   Used for:  Gastroesophageal reflux disease without esophagitis        TAKE ONE TABLET BY MOUTH ONCE DAILY   Quantity:  90 tablet   Refills:  2       PLAVIX 75 MG tablet   Generic drug:  clopidogrel        1 TABLET DAILY   Refills:  0       STATIN NOT PRESCRIBED (INTENTIONAL)   Used for:  Type 2 diabetes, HbA1C goal < 8% (H)        Statin not prescribed intentionally due to Intolerance   Quantity:  0 each   Refills:  0         CONTINUE these medicines which have NOT CHANGED        Dose / Directions    ACCU-CHEK BAL PLUS test strip   Used for:  Diabetic polyneuropathy associated with type 2 diabetes mellitus (H)   Generic drug:  blood glucose monitoring        USE TO TEST BLOOD SUGARS 1-3 TIMES DAILY AS DIRECTED.   Quantity:  100 each   Refills:  3                Protect others around you: Learn how to safely use, store and throw away your medicines at www.disposemymeds.org.         Follow-ups after your visit        Your next 10 appointments already scheduled     Nov 12, 2018 10:00 AM CST   Return Visit with PH WOUND EXAM ROOM   [facility] ([facility]    [address] [place] [id number] [phone]               Care Instructions        Further instructions from your care team       Today the wound on the right heel appears to be mostly clean but is a significant sore.  It will be slow to heal but we will try to keep the risk of bacteria and infection low.    New wound cares are as follows.  1 Clean with soap and water rinse with water and dry well.  2 Apply the Iodosorb paste to the wound, use the stick of a Q tip to remove the excess.  3 Cover with a large  band aid.  4 And change the dressing daily.    I will see you again in 4 weeks to re evaluate the sore.  I am here on Mondays and Thursdays.  If you have any concerns before your re visit call [phone] and the scheduling department will address you needs.    Thanks for coming in today.    Micheal Hammond RN [email or web address]     Additional Information About Your Visit        Care EveryWhere ID     This is your Care EveryWhere ID. This could be used by other organizations to access your [facility] medical records  [id number]         Primary Care Provider Office Phone # Fax #    Corky Jamel Yang [phone]      Equal Access to Services     [facility]: Hadii lino Ying, waaxda luqadaha, qaybta kaalmada jennifer, katt ramírez . So [facility] [phone].    ATENCIÓN: Si habla español, tiene a martinez disposición servicios gratuitos de asistencia lingüística. [facility] [phone].    We comply with applicable federal civil rights laws and [place] laws. We do not discriminate on the basis of race, color, national origin, age, disability, sex, sexual orientation, or gender identity.            Thank you!     Thank you for choosing [facility] for your care. Our goal is always to provide you with excellent care. Hearing back from our patients is one way we can continue to improve our services. Please take a few minutes to complete the written survey that you may receive in the mail after you visit with us. Thank you!             Medication List: This is a list of all your medications and when to take them. Check marks below indicate your daily home schedule. Keep this list as a reference.      Medications           Morning Afternoon Evening Bedtime As Needed    ACCU-CHEK BAL PLUS test strip   USE TO TEST BLOOD SUGARS 1-3 TIMES DAILY AS DIRECTED.   Generic drug:  blood glucose monitoring                                albuterol 108 (90 Base) MCG/ACT inhaler   Commonly known  as:  PROAIR HFA/PROVENTIL HFA/VENTOLIN HFA   Inhale 2 puffs into the lungs every 6 hours as needed for shortness of breath / dyspnea or wheezing                                amLODIPine 5 MG tablet   Commonly known as:  NORVASC   Take 1 tablet (5 mg) by mouth daily                                ASPIRIN NOT PRESCRIBED   Commonly known as:  INTENTIONAL   1 each continuous prn for other Antiplatelet medication not prescribed intentionally due to Uncontrolled hypertension (systolic > 180, diastolic > 100) and Current thienopryridine therapy                                glipiZIDE 10 MG tablet   Commonly known as:  GLUCOTROL   Take 1 tablet (10 mg) by mouth daily                                hydrochlorothiazide 25 MG tablet   Commonly known as:  HYDRODIURIL   Take 1 tablet (25 mg) by mouth daily                                losartan 100 MG tablet   Commonly known as:  COZAAR   Take 0.5 tablets (50 mg) by mouth 2 times daily                                Olodaterol HCl 2.5 MCG/ACT Aers   Inhale 1 puff into the lungs                                pantoprazole 40 MG EC tablet   Commonly known as:  PROTONIX   TAKE ONE TABLET BY MOUTH ONCE DAILY                                PLAVIX 75 MG tablet   1 TABLET DAILY   Generic drug:  clopidogrel                                STATIN NOT PRESCRIBED (INTENTIONAL)   Statin not prescribed intentionally due to Intolerance

## 2018-10-24 NOTE — TELEPHONE ENCOUNTER
Reason for Call:  [facility] just called, they received the prescription for amLODIPine (NORVASC) 5 MG tablet, they stated that Aston in not one of their patients.  Can you please confirm if the prescription should have been sent there.      Call taken on 10/24/2018 at 2:50 PM by Anabel Branham

## 2018-10-24 NOTE — TELEPHONE ENCOUNTER
[facility] [place] and [facility] for [facility].  They will call us back with the information.

## 2018-10-26 NOTE — TELEPHONE ENCOUNTER
Patients wife is calling regarding a [facility] that [facility]. They would like a call back from the nurse. Please call patient back at [phone]

## 2018-10-26 NOTE — TELEPHONE ENCOUNTER
Spoke with Kamille.  [facility] from [facility].  Will send [facility] to [facility] doc so they can prescribe them.      Will need Norvasc 5 mg daily #90 with refills sent and refill on the losartan 100 mg daily #90 with [facility].    Leno Phoenix, RN, BSN

## 2018-11-12 NOTE — PROGRESS NOTES
Reason For Visit: Aston Linn, 89 year old male, seen as outpatient to evaluate and treat a right posterior heel diabetic ulcer. Referred by Dr Lopez. Patient presents with his wife Kamille today.       History: Pt with medical history that includes, Diabetes type 2, PVD who noted a new ulcer on his right posterior heel in early August of this year 2018 that was not associated with any trauma or injury.  He had increasing pain and was seen in the ED on 8/9/18.  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 Pt was seen by Dr Lopez DPM on 10/11/18 and referred to the wound and ostomy clinic for wound recommendations.  At this time the pt is not interested in any interventions to improve arterial blood flow to the LE's. Initial visit to the Wound and Ostomy clinic was      Personal/social history: Pt lives alone independently     Objective:   Physical appearance: alert and oriented  Ambulation: independent  Current treatment plan: keeping the wound covered with band aid  Last changed: yesterday     Wound #1 Right posterior heel, mixed arterial and diabetic ulcer.  Stage/tissue depth: full thickness  0.5 cm L x 0.5 cm W x 0.2 cm D  Tunneling: no  Undermining: no  Wound bed type/amount: after cleansing and manually debriding with applicator loose slough wound was 100 % pale red nongranular tissue; NA fluctuant  Wound Edges: open with soft callusing  Periwound: hemosiderin staining and nonpitting edema  Drainage: small amount  Odor: no  Pain: yes 8/10 with any direct touch or pressure, sharp constant stabbing and burning pain.     Dorsalis Pedal Pulse: is not palpable: NA doppler: NA phasic  Hair growth: none noted below the knees  Capillary Refill: delayed approximately 5 seconds  Feet/toes color: dependent rubor  Nails: thick and  brittle  R Leg: Edema nonpitting edema. Ankle circumference Not assessed this visit cm. Calf circumference NA cm.  L Leg: Edema NA. Ankle circumference NA cm. Calf circumference NA cm.     Mobility: slow but appears normal  Current offloading/footwear: regular shoes, has diabetic shoes but does not wear  Sensation: peripheral neuropathy bilateral feet  HgbA1C: 6.3 Date: 9/6/18  Checks Blood Glucose?:  Pt checking 2 times daily states about the same, low 100s: NA  Other callousing/areas of concern: soft callusing and dry skin around the edges of the feet     Diet: Regular  Smoking: is a current cigarette smoker     Discussed: etiology of wound (diabetic ulcer/arterial ulcer), pathophysiology and patient specific goals for wound healing.   Education: On wound status, rational for use of new products in wound, role of products in wound healing, plans for follow up and signs of infection to monitor and report to MD. Reviewed effects of diabetes and arterial insuffiencey on wound healing and increased infection risk.        Assessment:  Small amount of improvement.  Best thing is the wound is tolerating the antimicrobial Iodosorb paste and it is not too dry or too moist.  Wound edges are allowing some new scar tissue to advance from the 12 o'clock edge.     Barriers to wound healing:   Poor nutrition: inadequate supply of protein, carbohydrates, fatty acids, and trace elements essential for all phases of wound healing.  Did teaching on need for protein in diet for healing and need to limit salt and control blood sugars  Reduced Blood Supply: inadequate perfusion to heal wound, present due to severe arterial insuffiencey in right LE.  Medication: NA  Chemotherapy: suppresses the immune system and inflammatory response, NA  Radiotherapy: increases production of free radical which damage cells, NA  Psychological stress: NA  Obesity: decreases tissue perfusion  Infection: prolongs inflammatory phase, uses vital nutrients,  impairs epithelialization and releases toxins, none currently noted, antimicrobial primary dressing is in use.  Underlying Disease: diabetes mellitis and autoimmune disorders, present but fairly well controlled.  Maceration: reduces wound tensile strength and inhibits epithelial migration, None noted but will continue to monitor.  Patient compliance, appears motivated to control the wounds status and improve as body allows.  Unrelieved pressure, NA  Immobility, NA  Substance abuse: NA     Plan:  Continue with wound cares as follows.  1 Clean with soap and water rinse with water and dry well.  2 Apply the Iodosorb paste to the wound, use the stick of a Q tip to remove the excess.  3 Cover with a large band aid.  4 And change the dressing daily.     Did give pt's wife tube of Sween 24 to be used on dry skin on feet including intact thin calluses daily.     Topical care: Iodosorb  Offloading: try to limit time on foot and pressure to heel  Additional recommendations: none at this time     Wound Care: Wound cleansed with Microklenz and gauze, patted dry. Periwound protected with NA. Wound base filled with Iodosorb paste. Covered with large band aid, followed by NA. Secured with NA. To be changed daily.     Discussed plan of care with patient and his wife. Teaching done with patient and his wife for dressing changes; pt's wife is able and willing to perform.     The following discharge instructions were reviewed with and sent home with the patient: See discharge instructions.     The following supplies were sent home with the patient:  Remains of the tubes of Iodosorb and Sween 24 opened but not used up today.  Will reassess care plan in 4 weeks and order patient supplies as needed     Return visit: 12/10/18 Monday     Verbal, written, & demonstrative education provided.  Face to face time (excluding procedure): approximately 25 minutes.  Procedure: NA  Care plan was not changed.     [phone]

## 2018-11-12 NOTE — IP AVS SNAPSHOT
[address] [address]    Phone:  [phone]    Fax:  [phone]                                       After Visit Summary   11/12/2018    Aston Linn    MRN: [id number]           After Visit Summary Signature Page     I have received my discharge instructions, and my questions have been answered. I have discussed any challenges I see with this plan with the nurse or doctor.    ..........................................................................................................................................  Patient/Patient Representative Signature      ..........................................................................................................................................  Patient Representative Print Name and Relationship to Patient    ..................................................               ................................................  Date                                   Time    ..........................................................................................................................................  Reviewed by Signature/Title    ...................................................              ..............................................  Date                                               Time          22EPIC Rev 08/18

## 2018-11-12 NOTE — IP AVS SNAPSHOT
MRN:[id number]                      After Visit Summary   11/12/2018    Aston Linn    MRN: [id number]           Visit Information        Provider Department      11/12/2018 10:00 AM PH WOUND EXAM ROOM [facility]           Review of your medicines      UNREVIEWED medicines. 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)        Dose:  5 mg   Take 1 tablet (5 mg) by mouth daily   Quantity:  90 tablet   Refills:  1       ASPIRIN NOT PRESCRIBED   Commonly known as:  INTENTIONAL   Used for:  Diabetic polyneuropathy associated with type 2 diabetes mellitus (H)        Dose:  1 each   1 each continuous prn for other Antiplatelet medication not prescribed intentionally due to Uncontrolled hypertension (systolic > 180, diastolic > 100) and Current thienopryridine therapy   Quantity:  0 each   Refills:  0       glipiZIDE 10 MG tablet   Commonly known as:  GLUCOTROL        Dose:  10 mg   Take 1 tablet (10 mg) by mouth daily   Quantity:  1 tablet   Refills:  1       hydrochlorothiazide 25 MG tablet   Commonly known as:  HYDRODIURIL   Used for:  CKD (chronic kidney disease) stage 3, GFR 30-59 ml/min (H), Hypertension goal BP (blood pressure) < 130/80        Dose:  25 mg   Take 1 tablet (25 mg) by mouth daily   Quantity:  90 tablet   Refills:  1       losartan 100 MG tablet   Commonly known as:  COZAAR   Used for:  Type 2 diabetes, HbA1C goal < 8% (H),  Hypertension, goal below 140/90        Dose:  100 mg   Take 1 tablet (100 mg) by mouth daily   Quantity:  90 tablet   Refills:  1       Olodaterol HCl 2.5 MCG/ACT Aers        Dose:  1 puff   Inhale 1 puff into the lungs   Refills:  0       pantoprazole 40 MG EC tablet   Commonly known as:  PROTONIX   Used for:  Gastroesophageal reflux disease without esophagitis        TAKE ONE TABLET BY MOUTH ONCE DAILY   Quantity:  90 tablet   Refills:  2       PLAVIX 75 MG tablet   Generic drug:  clopidogrel        1 TABLET DAILY   Refills:  0       STATIN NOT PRESCRIBED (INTENTIONAL)   Used for:  Type 2 diabetes, HbA1C goal < 8% (H)        Statin not prescribed intentionally due to Intolerance   Quantity:  0 each   Refills:  0         CONTINUE these medicines which have NOT CHANGED        Dose / Directions    ACCU-CHEK BAL PLUS test strip   Used for:  Diabetic polyneuropathy associated with type 2 diabetes mellitus (H)   Generic drug:  blood glucose monitoring        USE TO TEST BLOOD SUGARS 1-3 TIMES DAILY AS DIRECTED.   Quantity:  100 each   Refills:  3                Protect others around you: Learn how to safely use, store and throw away your medicines at www.disposemymeds.org.         Follow-ups after your visit        Your next 10 appointments already scheduled     Dec 10, 2018 10:00 AM CST   Return Visit with PH WOUND EXAM ROOM   [facility] ([facility])    [address] [place] [id number] [phone]               Care Instructions        Further instructions from your care team       Today the wound on the right heel appears to be mostly clean but is a significant sore.  It has made some small progress since I saw you last and the wound appears stable and infection free.    Continue wound cares are as follows.  1 Clean with soap and water rinse with water and dry well.  2 Apply the Iodosorb paste to the wound, use the stick of a Q tip to remove the excess.  3 Cover with  a large band aid.  4 And change the dressing daily.    I will also send home with you today a tube of Sween 24 moisturizer to use on the callus and dry skin on your feet.  Apply that daily.     I will see you again in 4 weeks to re evaluate the sore.  I am here on Mondays and Thursdays.  If you have any concerns before your re visit call [phone] and the scheduling department will address you needs.    Thanks for coming in today.    Micheal Hammond RN cwocn       Additional Information About Your Visit        Care EveryWhere ID     This is your Care EveryWhere ID. This could be used by other organizations to access your [facility] medical records  [id number]         Primary Care Provider Office Phone # Fax #    Corky Yang [phone]      Equal Access to Services     [facility] : Hadmariel contreras Sosrinath, waaxda luqadaha, qaybta kaalmada adeegyaashley, katt ramírez . So [facility] [phone].    ATENCIÓN: Si habla español, tiene a martinez disposición servicios gratuitos de asistencia lingüística. Llame al [phone].    We comply with applicable federal civil rights laws and [place] laws. We do not discriminate on the basis of race, color, national origin, age, disability, sex, sexual orientation, or gender identity.            Thank you!     Thank you for choosing [facility] for your care. Our goal is always to provide you with excellent care. Hearing back from our patients is one way we can continue to improve our services. Please take a few minutes to complete the written survey that you may receive in the mail after you visit with us. Thank you!             Medication List: This is a list of all your medications and when to take them. Check marks below indicate your daily home schedule. Keep this list as a reference.      Medications           Morning Afternoon Evening Bedtime As Needed    ACCU-CHEK BAL PLUS test strip   USE TO TEST BLOOD SUGARS 1-3 TIMES  DAILY AS DIRECTED.   Generic drug:  blood glucose monitoring                                albuterol 108 (90 Base) MCG/ACT inhaler   Commonly known as:  PROAIR HFA/PROVENTIL HFA/VENTOLIN HFA   Inhale 2 puffs into the lungs every 6 hours as needed for shortness of breath / dyspnea or wheezing                                amLODIPine 5 MG tablet   Commonly known as:  NORVASC   Take 1 tablet (5 mg) by mouth daily                                ASPIRIN NOT PRESCRIBED   Commonly known as:  INTENTIONAL   1 each continuous prn for other Antiplatelet medication not prescribed intentionally due to Uncontrolled hypertension (systolic > 180, diastolic > 100) and Current thienopryridine therapy                                glipiZIDE 10 MG tablet   Commonly known as:  GLUCOTROL   Take 1 tablet (10 mg) by mouth daily                                hydrochlorothiazide 25 MG tablet   Commonly known as:  HYDRODIURIL   Take 1 tablet (25 mg) by mouth daily                                losartan 100 MG tablet   Commonly known as:  COZAAR   Take 1 tablet (100 mg) by mouth daily                                Olodaterol HCl 2.5 MCG/ACT Aers   Inhale 1 puff into the lungs                                pantoprazole 40 MG EC tablet   Commonly known as:  PROTONIX   TAKE ONE TABLET BY MOUTH ONCE DAILY                                PLAVIX 75 MG tablet   1 TABLET DAILY   Generic drug:  clopidogrel                                STATIN NOT PRESCRIBED (INTENTIONAL)   Statin not prescribed intentionally due to Intolerance

## 2018-11-12 NOTE — DISCHARGE INSTRUCTIONS
Today the wound on the right heel appears to be mostly clean but is a significant sore.  It has made some small progress since I saw you last and the wound appears stable and infection free.    Continue wound cares are as follows.  1 Clean with soap and water rinse with water and dry well.  2 Apply the Iodosorb paste to the wound, use the stick of a Q tip to remove the excess.  3 Cover with a large band aid.  4 And change the dressing daily.    I will also send home with you today a tube of Sween 24 moisturizer to use on the callus and dry skin on your feet.  Apply that daily.     I will see you again in 4 weeks to re evaluate the sore.  I am here on Mondays and Thursdays.  If you have any concerns before your re visit call [phone] and the scheduling department will address you needs.    Thanks for coming in today.    Micheal Hammond RN cwocn

## 2018-12-10 NOTE — IP AVS SNAPSHOT
[address] [address]  Phone:  [phone]  Fax:  [phone]                                    After Visit Summary   12/10/2018    Aston Linn    MRN: [id number]           After Visit Summary Signature Page    I have received my discharge instructions, and my questions have been answered. I have discussed any challenges I see with this plan with the nurse or doctor.    ..........................................................................................................................................  Patient/Patient Representative Signature      ..........................................................................................................................................  Patient Representative Print Name and Relationship to Patient    ..................................................               ................................................  Date                                   Time    ..........................................................................................................................................  Reviewed by Signature/Title    ...................................................              ..............................................  Date                                               Time          22EPIC Rev 08/18

## 2018-12-10 NOTE — PROGRESS NOTES
Reason For Visit: Aston Linn, 89 year old male, seen as outpatient to evaluate and treat a right posterior heel diabetic ulcer. Referred by Dr Lopez. Patient presents with his wife Kamille today.       History: Pt with medical history that includes, Diabetes type 2, PVD who noted a new ulcer on his right posterior heel in early August of this year 2018 that was not associated with any trauma or injury.  He had increasing pain and was seen in the ED on 8/9/18.  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 Pt was seen by Dr Lopez DPM on 10/11/18 and referred to the wound and ostomy clinic for wound recommendations.  At this time the pt is not interested in any interventions to improve arterial blood flow to the LE's. Initial visit to the Wound and Ostomy clinic was      Personal/social history: Pt lives alone independently     Objective:   Physical appearance: alert and oriented  Ambulation: independent  Current treatment plan: Iodosorb paste is primary wound dressing  Last changed: yesterday     Wound #1 Right posterior heel, mixed arterial and diabetic ulcer.  Stage/tissue depth: full thickness  0.5 cm L x 0.5 cm W x 0.2 cm D  Tunneling: no  Undermining: no  Wound bed type/amount: pale red granular tissue; NA fluctuant  Wound Edges: open with soft callusing stained related to the iodine paste in use  Periwound: hemosiderin staining and nonpitting edema  Drainage: small amount  Odor: no  Pain: yes 5/10 with any direct touch or pressure, sharp constant stabbing and burning pain.     Dorsalis Pedal Pulse: is not palpable: NA doppler: NA phasic  Hair growth: none noted below the knees  Capillary Refill: delayed approximately 5 seconds  Feet/toes color: dependent rubor  Nails: thick and brittle  R Leg: Edema nonpitting edema. Ankle  circumference Not assessed this visit cm. Calf circumference NA cm.  L Leg: Edema NA. Ankle circumference NA cm. Calf circumference NA cm.     Mobility: slow but appears normal  Current offloading/footwear: regular shoes, has diabetic shoes but does not wear  Sensation: peripheral neuropathy bilateral feet  HgbA1C: 6.3 Date: 9/6/18  Checks Blood Glucose?:  Pt checking 2 times daily states about the same, low 100s:   Other callousing/areas of concern: soft callusing and dry skin around the edges of the feet     Diet: Regular  Smoking: is a current cigarette smoker     Discussed: etiology of wound (diabetic ulcer/arterial ulcer), pathophysiology and patient specific goals for wound healing.   Education: On wound status, rational for continued use of Iodosorb, role of products in wound healing, plans for follow up and signs of infection to monitor and report to MD. Reviewed effects of diabetes and arterial insuffiencey on wound healing and increased infection risk.        Assessment:  Small amount of improvement. No signs of infection noted, callusing around the wound is thin and not too dry nor to moist.       Barriers to wound healing:   Poor nutrition: inadequate supply of protein, carbohydrates, fatty acids, and trace elements essential for all phases of wound healing.  Did teaching on need for protein in diet for healing and need to limit salt and control blood sugars  Reduced Blood Supply: inadequate perfusion to heal wound, present due to severe arterial insuffiencey in right LE.  Medication: NA  Chemotherapy: suppresses the immune system and inflammatory response, NA  Radiotherapy: increases production of free radical which damage cells, NA  Psychological stress: NA  Obesity: decreases tissue perfusion  Infection: prolongs inflammatory phase, uses vital nutrients, impairs epithelialization and releases toxins, none currently noted, antimicrobial primary dressing is in use.  Underlying Disease: diabetes mellitis  and autoimmune disorders, present but fairly well controlled.  Maceration: reduces wound tensile strength and inhibits epithelial migration, None noted but will continue to monitor.  Patient compliance, appears motivated to control the wounds status and improve as body allows.  Unrelieved pressure, NA  Immobility, NA  Substance abuse: NA     Plan:  Continue with wound cares as follows.  1 Clean with soap and water rinse with water and dry well.  2 Apply the Iodosorb paste to the wound, use the stick of a Q tip to remove the excess.  3 Cover with a large band aid.  4 And change the dressing daily.      Topical care: Iodosorb  Offloading: try to limit time on foot and pressure to heel  Additional recommendations: none at this time     Wound Care: Wound cleansed with Microklenz and gauze, patted dry. Periwound protected with NA. Wound base filled with Iodosorb paste. Covered with large band aid, followed by NA. Secured with NA. To be changed daily.     Discussed plan of care with patient and his wife. Teaching done with patient and his wife for dressing changes; pt's wife is able and willing to perform.     The following discharge instructions were reviewed with and sent home with the patient: Unable to print AVS/discharge summary today.  Gave pt in writing on date and time of next scheduled clinic visit.       The following supplies were sent home with the patient:  Remains of the tubes of Iodosorb and Sween 24 opened but not used up today.  Will reassess care plan in 4 weeks and order patient supplies as needed     Return visit: 12/10/18 Monday     Verbal, written, & demonstrative education provided.  Face to face time (excluding procedure): approximately 25 minutes.  Procedure: NA  Care plan was not changed.     [phone]

## 2018-12-10 NOTE — IP AVS SNAPSHOT
MRN:[id number]                      After Visit Summary   12/10/2018    Aston Linn    MRN: [id number]           Visit Information        Provider Department      12/10/2018 10:00 AM PH WOUND EXAM ROOM [facility]           Review of your medicines      UNREVIEWED medicines. Ask your doctor about these medicines       Dose / Directions   albuterol 108 (90 Base) MCG/ACT inhaler  Commonly known as:  PROAIR HFA/PROVENTIL HFA/VENTOLIN HFA  Used for:  Chronic obstructive pulmonary disease, unspecified COPD type (H)      Dose:  2 puff  Inhale 2 puffs into the lungs every 6 hours as needed for shortness of breath / dyspnea or wheezing  Quantity:  1 Inhaler  Refills:  1     amLODIPine 5 MG tablet  Commonly known as:  NORVASC  Used for:  Hypertension goal BP (blood pressure) < 130/80, CKD (chronic kidney disease) stage 4, GFR 15-29 ml/min (H)      Dose:  5 mg  Take 1 tablet (5 mg) by mouth daily  Quantity:  90 tablet  Refills:  1     ASPIRIN NOT PRESCRIBED  Commonly known as:  INTENTIONAL  Used for:  Diabetic polyneuropathy associated with type 2 diabetes mellitus (H)      Dose:  1 each  1 each continuous prn for other Antiplatelet medication not prescribed intentionally due to Uncontrolled hypertension (systolic > 180, diastolic > 100) and Current thienopryridine therapy  Quantity:  0 each  Refills:  0     glipiZIDE 10 MG tablet  Commonly known as:  GLUCOTROL      Dose:  10 mg  Take 1 tablet (10 mg) by mouth daily  Quantity:  1 tablet  Refills:  1     hydrochlorothiazide 25 MG tablet  Commonly known as:  HYDRODIURIL  Used for:  CKD (chronic kidney disease) stage 3, GFR 30-59 ml/min (H), Hypertension goal BP (blood pressure) < 130/80      Dose:  25 mg  Take 1 tablet (25 mg) by mouth daily  Quantity:  90 tablet  Refills:  1     losartan 100 MG tablet  Commonly known as:  COZAAR  Used for:  Type 2 diabetes, HbA1C goal < 8% (H), Hypertension, goal below 140/90      Dose:  100 mg  Take 1  tablet (100 mg) by mouth daily  Quantity:  90 tablet  Refills:  1     Olodaterol HCl 2.5 MCG/ACT Aers      Dose:  1 puff  Inhale 1 puff into the lungs  Refills:  0     pantoprazole 40 MG EC tablet  Commonly known as:  PROTONIX  Used for:  Gastroesophageal reflux disease without esophagitis      TAKE ONE TABLET BY MOUTH ONCE DAILY  Quantity:  90 tablet  Refills:  2     PLAVIX 75 MG tablet  Generic drug:  clopidogrel      1 TABLET DAILY  Refills:  0     STATIN NOT PRESCRIBED  Commonly known as:  INTENTIONAL  Used for:  Type 2 diabetes, HbA1C goal < 8% (H)      Statin not prescribed intentionally due to Intolerance  Quantity:  0 each  Refills:  0        CONTINUE these medicines which have NOT CHANGED       Dose / Directions   ACCU-CHEK BAL PLUS test strip  Used for:  Diabetic polyneuropathy associated with type 2 diabetes mellitus (H)  Generic drug:  blood glucose monitoring      USE TO TEST BLOOD SUGARS 1-3 TIMES DAILY AS DIRECTED.  Quantity:  100 each  Refills:  3              Protect others around you: Learn how to safely use, store and throw away your medicines at www.disposemymeds.org.       Follow-ups after your visit       Your next 10 appointments already scheduled    Dec 10, 2018 10:00 AM CST  Return Visit with PH WOUND EXAM ROOM  [facility] [address] [place] [id number] [phone]      Care Instructions       Further instructions from your care team       test    Additional Information About Your Visit       Care EveryWhere ID    This is your Care EveryWhere ID. This could be used by other organizations to access your [facility] medical records  [id number]        Primary Care Provider Office Phone # Fax #    Corky Yang [phone]      Equal Access to Services    AMADA SU : Abelardo contreras Sosrinath, waaxda luqadaha, qaybta kaalmaashley rodriguez, katt idiin hayaan adeeg kharash la'aan . So [facility]  [phone].    ATENCIÓN: Si eugenio brown, tiene a martinez disposición servicios gratuitos de asistencia lingüística. Arlen orellana [phone].    We comply with applicable federal civil rights laws and [place] laws. We do not discriminate on the basis of race, color, national origin, age, disability, sex, sexual orientation, or gender identity.           Thank you!    Thank you for choosing [facility] for your care. Our goal is always to provide you with excellent care. Hearing back from our patients is one way we can continue to improve our services. Please take a few minutes to complete the written survey that you may receive in the mail after you visit with us. Thank you!            Medication List: This is a list of all your medications and when to take them. Check marks below indicate your daily home schedule. Keep this list as a reference.      Medications       Morning Afternoon Evening Bedtime As Needed   ACCU-CHEK BAL PLUS test strip  USE TO TEST BLOOD SUGARS 1-3 TIMES DAILY AS DIRECTED.  Generic drug:  blood glucose monitoring                       albuterol 108 (90 Base) MCG/ACT inhaler  Commonly known as:  PROAIR HFA/PROVENTIL HFA/VENTOLIN HFA  Inhale 2 puffs into the lungs every 6 hours as needed for shortness of breath / dyspnea or wheezing                       amLODIPine 5 MG tablet  Commonly known as:  NORVASC  Take 1 tablet (5 mg) by mouth daily                       ASPIRIN NOT PRESCRIBED  Commonly known as:  INTENTIONAL  1 each continuous prn for other Antiplatelet medication not prescribed intentionally due to Uncontrolled hypertension (systolic > 180, diastolic > 100) and Current thienopryridine therapy                       glipiZIDE 10 MG tablet  Commonly known as:  GLUCOTROL  Take 1 tablet (10 mg) by mouth daily                       hydrochlorothiazide 25 MG tablet  Commonly known as:  HYDRODIURIL  Take 1 tablet (25 mg) by mouth daily                       losartan 100 MG tablet  Commonly  known as:  COZAAR  Take 1 tablet (100 mg) by mouth daily                       Olodaterol HCl 2.5 MCG/ACT Aers  Inhale 1 puff into the lungs                       pantoprazole 40 MG EC tablet  Commonly known as:  PROTONIX  TAKE ONE TABLET BY MOUTH ONCE DAILY                       PLAVIX 75 MG tablet  1 TABLET DAILY  Generic drug:  clopidogrel                       STATIN NOT PRESCRIBED  Commonly known as:  INTENTIONAL  Statin not prescribed intentionally due to Intolerance

## 2019-01-01 ENCOUNTER — TRANSFERRED RECORDS (OUTPATIENT)
Dept: HEALTH INFORMATION MANAGEMENT | Facility: CLINIC | Age: 84
End: 2019-01-01

## 2019-01-01 ENCOUNTER — HOSPITAL ENCOUNTER (OUTPATIENT)
Facility: CLINIC | Age: 84
Setting detail: OBSERVATION
Discharge: HOME | End: 2019-08-18
Attending: NURSE PRACTITIONER | Admitting: FAMILY MEDICINE
Payer: COMMERCIAL

## 2019-01-01 ENCOUNTER — TELEPHONE (OUTPATIENT)
Dept: FAMILY MEDICINE | Facility: OTHER | Age: 84
End: 2019-01-01

## 2019-01-01 ENCOUNTER — PATIENT OUTREACH (OUTPATIENT)
Dept: CARE COORDINATION | Facility: CLINIC | Age: 84
End: 2019-01-01

## 2019-01-01 ENCOUNTER — APPOINTMENT (OUTPATIENT)
Dept: GENERAL RADIOLOGY | Facility: CLINIC | Age: 84
End: 2019-01-01
Attending: NURSE PRACTITIONER
Payer: COMMERCIAL

## 2019-01-01 ENCOUNTER — HOSPITAL ENCOUNTER (EMERGENCY)
Facility: CLINIC | Age: 84
Discharge: ANOTHER HOSPITAL | End: 2019-08-11
Attending: FAMILY MEDICINE | Admitting: FAMILY MEDICINE
Payer: COMMERCIAL

## 2019-01-01 ENCOUNTER — APPOINTMENT (OUTPATIENT)
Dept: GENERAL RADIOLOGY | Facility: CLINIC | Age: 84
End: 2019-01-01
Attending: FAMILY MEDICINE
Payer: COMMERCIAL

## 2019-01-01 ENCOUNTER — OFFICE VISIT (OUTPATIENT)
Dept: FAMILY MEDICINE | Facility: OTHER | Age: 84
End: 2019-01-01
Payer: COMMERCIAL

## 2019-01-01 ENCOUNTER — HOSPITAL ENCOUNTER (OUTPATIENT)
Dept: WOUND CARE | Facility: CLINIC | Age: 84
End: 2019-01-21
Attending: FAMILY MEDICINE
Payer: COMMERCIAL

## 2019-01-01 ENCOUNTER — HOSPITAL ENCOUNTER (OUTPATIENT)
Dept: WOUND CARE | Facility: CLINIC | Age: 84
Discharge: HOME | End: 2019-01-07
Attending: FAMILY MEDICINE | Admitting: FAMILY MEDICINE
Payer: COMMERCIAL

## 2019-01-01 ENCOUNTER — DOCUMENTATION ONLY (OUTPATIENT)
Dept: OTHER | Facility: CLINIC | Age: 84
End: 2019-01-01

## 2019-01-01 ENCOUNTER — HOSPITAL ENCOUNTER (OUTPATIENT)
Facility: CLINIC | Age: 84
Setting detail: OBSERVATION
Discharge: HOME | End: 2019-02-16
Attending: FAMILY MEDICINE | Admitting: FAMILY MEDICINE
Payer: COMMERCIAL

## 2019-01-01 ENCOUNTER — DOCUMENTATION ONLY (OUTPATIENT)
Dept: CARE COORDINATION | Facility: CLINIC | Age: 84
End: 2019-01-01

## 2019-01-01 VITALS
SYSTOLIC BLOOD PRESSURE: 134 MMHG | TEMPERATURE: 97.8 F | HEART RATE: 77 BPM | HEIGHT: 72 IN | RESPIRATION RATE: 20 BRPM | WEIGHT: 194.67 LBS | OXYGEN SATURATION: 91 % | BODY MASS INDEX: 26.37 KG/M2 | DIASTOLIC BLOOD PRESSURE: 56 MMHG

## 2019-01-01 VITALS
RESPIRATION RATE: 18 BRPM | WEIGHT: 206.35 LBS | OXYGEN SATURATION: 93 % | BODY MASS INDEX: 27.95 KG/M2 | DIASTOLIC BLOOD PRESSURE: 69 MMHG | HEIGHT: 72 IN | TEMPERATURE: 96.7 F | SYSTOLIC BLOOD PRESSURE: 160 MMHG | HEART RATE: 92 BPM

## 2019-01-01 VITALS
TEMPERATURE: 96.9 F | DIASTOLIC BLOOD PRESSURE: 62 MMHG | HEART RATE: 82 BPM | SYSTOLIC BLOOD PRESSURE: 154 MMHG | OXYGEN SATURATION: 97 % | RESPIRATION RATE: 18 BRPM

## 2019-01-01 VITALS — SYSTOLIC BLOOD PRESSURE: 138 MMHG | DIASTOLIC BLOOD PRESSURE: 60 MMHG | HEART RATE: 90 BPM

## 2019-01-01 VITALS
SYSTOLIC BLOOD PRESSURE: 162 MMHG | RESPIRATION RATE: 22 BRPM | HEART RATE: 92 BPM | OXYGEN SATURATION: 95 % | WEIGHT: 202.6 LBS | DIASTOLIC BLOOD PRESSURE: 83 MMHG | TEMPERATURE: 97.6 F | BODY MASS INDEX: 27.48 KG/M2

## 2019-01-01 DIAGNOSIS — N18.4 CKD (CHRONIC KIDNEY DISEASE) STAGE 4, GFR 15-29 ML/MIN (H): ICD-10-CM

## 2019-01-01 DIAGNOSIS — E11.40 TYPE 2 DIABETES MELLITUS WITH DIABETIC NEUROPATHY, WITHOUT LONG-TERM CURRENT USE OF INSULIN (H): ICD-10-CM

## 2019-01-01 DIAGNOSIS — R53.1 GENERALIZED WEAKNESS: ICD-10-CM

## 2019-01-01 DIAGNOSIS — J44.1 COPD EXACERBATION (H): Primary | ICD-10-CM

## 2019-01-01 DIAGNOSIS — S91.301A OPEN WOUND OF HEEL, RIGHT, INITIAL ENCOUNTER: ICD-10-CM

## 2019-01-01 DIAGNOSIS — N18.30 CKD (CHRONIC KIDNEY DISEASE) STAGE 3, GFR 30-59 ML/MIN (H): ICD-10-CM

## 2019-01-01 DIAGNOSIS — I71.43 ANEURYSM OF INFRARENAL ABDOMINAL AORTA (H): ICD-10-CM

## 2019-01-01 DIAGNOSIS — R09.02 HYPOXIA: Primary | ICD-10-CM

## 2019-01-01 DIAGNOSIS — R06.02 SHORTNESS OF BREATH: ICD-10-CM

## 2019-01-01 DIAGNOSIS — I45.10 RIGHT BUNDLE BRANCH BLOCK: ICD-10-CM

## 2019-01-01 DIAGNOSIS — I65.22 LEFT CAROTID ARTERY STENOSIS: ICD-10-CM

## 2019-01-01 DIAGNOSIS — G45.8 OTHER SPECIFIED TRANSIENT CEREBRAL ISCHEMIAS: ICD-10-CM

## 2019-01-01 DIAGNOSIS — E11.21 TYPE 2 DIABETES MELLITUS WITH DIABETIC NEPHROPATHY, WITHOUT LONG-TERM CURRENT USE OF INSULIN (H): ICD-10-CM

## 2019-01-01 DIAGNOSIS — I69.328 SPEECH AND LANGUAGE DEFICIT DUE TO OLD STROKE: ICD-10-CM

## 2019-01-01 DIAGNOSIS — D62 ANEMIA DUE TO BLOOD LOSS, ACUTE: ICD-10-CM

## 2019-01-01 DIAGNOSIS — I10 HYPERTENSION GOAL BP (BLOOD PRESSURE) < 130/80: ICD-10-CM

## 2019-01-01 DIAGNOSIS — J44.9 COPD (CHRONIC OBSTRUCTIVE PULMONARY DISEASE) (H): ICD-10-CM

## 2019-01-01 DIAGNOSIS — K92.2 GASTROINTESTINAL HEMORRHAGE, UNSPECIFIED GASTROINTESTINAL HEMORRHAGE TYPE: ICD-10-CM

## 2019-01-01 DIAGNOSIS — E87.5 HYPERKALEMIA: ICD-10-CM

## 2019-01-01 DIAGNOSIS — I10 HYPERTENSION GOAL BP (BLOOD PRESSURE) < 130/80: Primary | ICD-10-CM

## 2019-01-01 DIAGNOSIS — D62 ACUTE BLOOD LOSS ANEMIA: Primary | ICD-10-CM

## 2019-01-01 DIAGNOSIS — J90 EXUDATIVE PLEURISY: ICD-10-CM

## 2019-01-01 DIAGNOSIS — Z53.9 DIAGNOSIS NOT YET DEFINED: Primary | ICD-10-CM

## 2019-01-01 DIAGNOSIS — J44.9 CHRONIC OBSTRUCTIVE PULMONARY DISEASE, UNSPECIFIED COPD TYPE (H): ICD-10-CM

## 2019-01-01 DIAGNOSIS — I73.9 PERIPHERAL VASCULAR DISEASE OF LOWER EXTREMITY WITH ULCERATION (H): ICD-10-CM

## 2019-01-01 DIAGNOSIS — F17.210 CIGARETTE SMOKER: ICD-10-CM

## 2019-01-01 DIAGNOSIS — I73.9 PERIPHERAL VASCULAR DISEASE (H): ICD-10-CM

## 2019-01-01 DIAGNOSIS — E11.40 TYPE 2 DIABETES MELLITUS WITH DIABETIC NEUROPATHY, WITHOUT LONG-TERM CURRENT USE OF INSULIN (H): Primary | ICD-10-CM

## 2019-01-01 DIAGNOSIS — I45.10 RBBB: ICD-10-CM

## 2019-01-01 DIAGNOSIS — L60.3 ONYCHODYSTROPHY: ICD-10-CM

## 2019-01-01 DIAGNOSIS — F17.200 TOBACCO USE DISORDER: ICD-10-CM

## 2019-01-01 DIAGNOSIS — L97.909 PERIPHERAL VASCULAR DISEASE OF LOWER EXTREMITY WITH ULCERATION (H): ICD-10-CM

## 2019-01-01 DIAGNOSIS — F41.1 ANXIETY STATE: ICD-10-CM

## 2019-01-01 DIAGNOSIS — D62 ACUTE BLOOD LOSS ANEMIA: ICD-10-CM

## 2019-01-01 DIAGNOSIS — R79.89 ELEVATED BRAIN NATRIURETIC PEPTIDE (BNP) LEVEL: ICD-10-CM

## 2019-01-01 DIAGNOSIS — Z71.89 ACP (ADVANCE CARE PLANNING): Chronic | ICD-10-CM

## 2019-01-01 DIAGNOSIS — E78.5 HYPERLIPIDEMIA LDL GOAL <100: ICD-10-CM

## 2019-01-01 DIAGNOSIS — J44.1 COPD EXACERBATION (H): ICD-10-CM

## 2019-01-01 LAB
ABO + RH BLD: NORMAL
ALBUMIN SERPL-MCNC: 3.1 G/DL (ref 3.4–5)
ALBUMIN SERPL-MCNC: 3.4 G/DL (ref 3.4–5)
ALBUMIN SERPL-MCNC: 3.4 G/DL (ref 3.4–5)
ALP SERPL-CCNC: 100 U/L (ref 40–150)
ALP SERPL-CCNC: 107 U/L (ref 40–150)
ALP SERPL-CCNC: 92 U/L (ref 40–150)
ALT SERPL W P-5'-P-CCNC: 11 U/L (ref 0–70)
ALT SERPL W P-5'-P-CCNC: 12 U/L (ref 0–70)
ALT SERPL W P-5'-P-CCNC: 14 U/L (ref 0–70)
ANION GAP SERPL CALCULATED.3IONS-SCNC: 10 MMOL/L (ref 3–14)
ANION GAP SERPL CALCULATED.3IONS-SCNC: 10 MMOL/L (ref 3–14)
ANION GAP SERPL CALCULATED.3IONS-SCNC: 11 MMOL/L (ref 3–14)
ANION GAP SERPL CALCULATED.3IONS-SCNC: 7 MMOL/L (ref 3–14)
ANION GAP SERPL CALCULATED.3IONS-SCNC: 8 MMOL/L (ref 3–14)
APTT PPP: 20 SEC (ref 22–37)
APTT PPP: 34 SEC (ref 22–37)
AST SERPL W P-5'-P-CCNC: 11 U/L (ref 0–45)
AST SERPL W P-5'-P-CCNC: 7 U/L (ref 0–45)
AST SERPL W P-5'-P-CCNC: 9 U/L (ref 0–45)
BASE DEFICIT BLDV-SCNC: 4.2 MMOL/L
BASOPHILS # BLD AUTO: 0 10E9/L (ref 0–0.2)
BASOPHILS # BLD AUTO: 0 10E9/L (ref 0–0.2)
BASOPHILS # BLD AUTO: 0.1 10E9/L (ref 0–0.2)
BASOPHILS # BLD AUTO: 0.1 10E9/L (ref 0–0.2)
BASOPHILS NFR BLD AUTO: 0.3 %
BASOPHILS NFR BLD AUTO: 0.4 %
BASOPHILS NFR BLD AUTO: 0.8 %
BASOPHILS NFR BLD AUTO: 1.1 %
BILIRUB SERPL-MCNC: 0.2 MG/DL (ref 0.2–1.3)
BILIRUB SERPL-MCNC: 0.3 MG/DL (ref 0.2–1.3)
BILIRUB SERPL-MCNC: 0.3 MG/DL (ref 0.2–1.3)
BLD GP AB SCN SERPL QL: NORMAL
BLD GP AB SCN SERPL QL: NORMAL
BLD PROD TYP BPU: NORMAL
BLD PROD TYP BPU: NORMAL
BLD UNIT ID BPU: 0
BLOOD BANK CMNT PATIENT-IMP: NORMAL
BLOOD BANK CMNT PATIENT-IMP: NORMAL
BLOOD PRODUCT CODE: NORMAL
BPU ID: NORMAL
BUN SERPL-MCNC: 48 MG/DL (ref 7–30)
BUN SERPL-MCNC: 48 MG/DL (ref 7–30)
BUN SERPL-MCNC: 58 MG/DL (ref 7–30)
BUN SERPL-MCNC: 62 MG/DL (ref 7–30)
BUN SERPL-MCNC: 64 MG/DL (ref 7–30)
BUN SERPL-MCNC: 75 MG/DL (ref 7–30)
BUN SERPL-MCNC: 86 MG/DL (ref 7–30)
CALCIUM SERPL-MCNC: 8 MG/DL (ref 8.5–10.1)
CALCIUM SERPL-MCNC: 8.1 MG/DL (ref 8.5–10.1)
CALCIUM SERPL-MCNC: 8.1 MG/DL (ref 8.5–10.1)
CALCIUM SERPL-MCNC: 8.3 MG/DL (ref 8.5–10.1)
CALCIUM SERPL-MCNC: 8.5 MG/DL (ref 8.5–10.1)
CALCIUM SERPL-MCNC: 8.5 MG/DL (ref 8.5–10.1)
CALCIUM SERPL-MCNC: 8.7 MG/DL (ref 8.5–10.1)
CHLORIDE SERPL-SCNC: 107 MMOL/L (ref 94–109)
CHLORIDE SERPL-SCNC: 109 MMOL/L (ref 94–109)
CHLORIDE SERPL-SCNC: 110 MMOL/L (ref 94–109)
CHLORIDE SERPL-SCNC: 111 MMOL/L (ref 94–109)
CHLORIDE SERPL-SCNC: 111 MMOL/L (ref 94–109)
CHLORIDE SERPL-SCNC: 112 MMOL/L (ref 94–109)
CHLORIDE SERPL-SCNC: 113 MMOL/L (ref 94–109)
CO2 SERPL-SCNC: 20 MMOL/L (ref 20–32)
CO2 SERPL-SCNC: 20 MMOL/L (ref 20–32)
CO2 SERPL-SCNC: 21 MMOL/L (ref 20–32)
CO2 SERPL-SCNC: 21 MMOL/L (ref 20–32)
CO2 SERPL-SCNC: 22 MMOL/L (ref 20–32)
CO2 SERPL-SCNC: 23 MMOL/L (ref 20–32)
CO2 SERPL-SCNC: 24 MMOL/L (ref 20–32)
CREAT SERPL-MCNC: 1.98 MG/DL (ref 0.66–1.25)
CREAT SERPL-MCNC: 2.17 MG/DL (ref 0.66–1.25)
CREAT SERPL-MCNC: 2.31 MG/DL (ref 0.66–1.25)
CREAT SERPL-MCNC: 2.36 MG/DL (ref 0.66–1.25)
CREAT SERPL-MCNC: 2.47 MG/DL (ref 0.66–1.25)
CREAT SERPL-MCNC: 2.51 MG/DL (ref 0.66–1.25)
CREAT SERPL-MCNC: 2.61 MG/DL (ref 0.66–1.25)
CRP SERPL-MCNC: 39.5 MG/L (ref 0–8)
DIFFERENTIAL METHOD BLD: ABNORMAL
EOSINOPHIL NFR BLD AUTO: 0.1 %
EOSINOPHIL NFR BLD AUTO: 3.8 %
EOSINOPHIL NFR BLD AUTO: 3.9 %
EOSINOPHIL NFR BLD AUTO: 3.9 %
ERYTHROCYTE [DISTWIDTH] IN BLOOD BY AUTOMATED COUNT: 13.7 % (ref 10–15)
ERYTHROCYTE [DISTWIDTH] IN BLOOD BY AUTOMATED COUNT: 14 % (ref 10–15)
ERYTHROCYTE [DISTWIDTH] IN BLOOD BY AUTOMATED COUNT: 14.6 % (ref 10–15)
ERYTHROCYTE [DISTWIDTH] IN BLOOD BY AUTOMATED COUNT: 14.7 % (ref 10–15)
ERYTHROCYTE [DISTWIDTH] IN BLOOD BY AUTOMATED COUNT: 14.8 % (ref 10–15)
GFR SERPL CREATININE-BSD FRML MDRD: 20 ML/MIN/{1.73_M2}
GFR SERPL CREATININE-BSD FRML MDRD: 21 ML/MIN/{1.73_M2}
GFR SERPL CREATININE-BSD FRML MDRD: 22 ML/MIN/{1.73_M2}
GFR SERPL CREATININE-BSD FRML MDRD: 23 ML/MIN/{1.73_M2}
GFR SERPL CREATININE-BSD FRML MDRD: 23 ML/MIN/{1.73_M2}
GFR SERPL CREATININE-BSD FRML MDRD: 25 ML/MIN/{1.73_M2}
GFR SERPL CREATININE-BSD FRML MDRD: 28 ML/MIN/{1.73_M2}
GLUCOSE BLDC GLUCOMTR-MCNC: 102 MG/DL (ref 70–99)
GLUCOSE BLDC GLUCOMTR-MCNC: 186 MG/DL (ref 70–99)
GLUCOSE SERPL-MCNC: 107 MG/DL (ref 70–99)
GLUCOSE SERPL-MCNC: 113 MG/DL (ref 70–99)
GLUCOSE SERPL-MCNC: 138 MG/DL (ref 70–99)
GLUCOSE SERPL-MCNC: 168 MG/DL (ref 70–99)
GLUCOSE SERPL-MCNC: 200 MG/DL (ref 70–99)
GLUCOSE SERPL-MCNC: 79 MG/DL (ref 70–99)
GLUCOSE SERPL-MCNC: 90 MG/DL (ref 70–99)
HBA1C MFR BLD: 6.8 % (ref 0–5.6)
HCO3 BLDV-SCNC: 22 MMOL/L (ref 21–28)
HCT VFR BLD AUTO: 25 % (ref 40–53)
HCT VFR BLD AUTO: 25.9 % (ref 40–53)
HCT VFR BLD AUTO: 28.4 % (ref 40–53)
HCT VFR BLD AUTO: 36.1 % (ref 40–53)
HCT VFR BLD AUTO: 36.5 % (ref 40–53)
HEMOCCULT STL QL: POSITIVE
HEMOCCULT STL QL: POSITIVE
HGB BLD-MCNC: 11.1 G/DL (ref 13.3–17.7)
HGB BLD-MCNC: 11.2 G/DL (ref 13.3–17.7)
HGB BLD-MCNC: 7.7 G/DL (ref 13.3–17.7)
HGB BLD-MCNC: 8 G/DL (ref 13.3–17.7)
HGB BLD-MCNC: 8.3 G/DL (ref 13.3–17.7)
HGB BLD-MCNC: 8.9 G/DL (ref 13.3–17.7)
IMM GRANULOCYTES # BLD: 0 10E9/L (ref 0–0.4)
IMM GRANULOCYTES # BLD: 0.1 10E9/L (ref 0–0.4)
IMM GRANULOCYTES NFR BLD: 0.3 %
IMM GRANULOCYTES NFR BLD: 0.4 %
IMM GRANULOCYTES NFR BLD: 0.5 %
IMM GRANULOCYTES NFR BLD: 0.5 %
INR PPP: 0.96 (ref 0.86–1.14)
INR PPP: 0.99 (ref 0.86–1.14)
INR PPP: 1.05 (ref 0.86–1.14)
LACTATE BLD-SCNC: 1 MMOL/L (ref 0.7–2)
LYMPHOCYTES # BLD AUTO: 0.5 10E9/L (ref 0.8–5.3)
LYMPHOCYTES # BLD AUTO: 0.8 10E9/L (ref 0.8–5.3)
LYMPHOCYTES # BLD AUTO: 1 10E9/L (ref 0.8–5.3)
LYMPHOCYTES # BLD AUTO: 1 10E9/L (ref 0.8–5.3)
LYMPHOCYTES NFR BLD AUTO: 10.5 %
LYMPHOCYTES NFR BLD AUTO: 11.7 %
LYMPHOCYTES NFR BLD AUTO: 6.6 %
LYMPHOCYTES NFR BLD AUTO: 9.1 %
MAGNESIUM SERPL-MCNC: 1.9 MG/DL (ref 1.6–2.3)
MCH RBC QN AUTO: 28.1 PG (ref 26.5–33)
MCH RBC QN AUTO: 28.2 PG (ref 26.5–33)
MCH RBC QN AUTO: 28.2 PG (ref 26.5–33)
MCH RBC QN AUTO: 30.2 PG (ref 26.5–33)
MCH RBC QN AUTO: 30.2 PG (ref 26.5–33)
MCHC RBC AUTO-ENTMCNC: 30.7 G/DL (ref 31.5–36.5)
MCHC RBC AUTO-ENTMCNC: 30.7 G/DL (ref 31.5–36.5)
MCHC RBC AUTO-ENTMCNC: 30.8 G/DL (ref 31.5–36.5)
MCHC RBC AUTO-ENTMCNC: 30.9 G/DL (ref 31.5–36.5)
MCHC RBC AUTO-ENTMCNC: 31.3 G/DL (ref 31.5–36.5)
MCV RBC AUTO: 90 FL (ref 78–100)
MCV RBC AUTO: 91 FL (ref 78–100)
MCV RBC AUTO: 92 FL (ref 78–100)
MCV RBC AUTO: 98 FL (ref 78–100)
MCV RBC AUTO: 98 FL (ref 78–100)
MONOCYTES # BLD AUTO: 0.1 10E9/L (ref 0–1.3)
MONOCYTES # BLD AUTO: 0.5 10E9/L (ref 0–1.3)
MONOCYTES # BLD AUTO: 0.7 10E9/L (ref 0–1.3)
MONOCYTES # BLD AUTO: 0.9 10E9/L (ref 0–1.3)
MONOCYTES NFR BLD AUTO: 0.9 %
MONOCYTES NFR BLD AUTO: 5.1 %
MONOCYTES NFR BLD AUTO: 7.4 %
MONOCYTES NFR BLD AUTO: 8.9 %
NEUTROPHILS # BLD AUTO: 6.7 10E9/L (ref 1.6–8.3)
NEUTROPHILS # BLD AUTO: 7.2 10E9/L (ref 1.6–8.3)
NEUTROPHILS # BLD AUTO: 7.3 10E9/L (ref 1.6–8.3)
NEUTROPHILS # BLD AUTO: 7.4 10E9/L (ref 1.6–8.3)
NEUTROPHILS NFR BLD AUTO: 75.6 %
NEUTROPHILS NFR BLD AUTO: 75.8 %
NEUTROPHILS NFR BLD AUTO: 80.8 %
NEUTROPHILS NFR BLD AUTO: 91.6 %
NRBC # BLD AUTO: 0 10*3/UL
NRBC BLD AUTO-RTO: 0 /100
NT-PROBNP SERPL-MCNC: 6115 PG/ML (ref 0–1800)
NUM BPU REQUESTED: 1
O2/TOTAL GAS SETTING VFR VENT: 21 %
PCO2 BLDV: 41 MM HG (ref 40–50)
PH BLDV: 7.33 PH (ref 7.32–7.43)
PLATELET # BLD AUTO: 352 10E9/L (ref 150–450)
PLATELET # BLD AUTO: 356 10E9/L (ref 150–450)
PLATELET # BLD AUTO: 369 10E9/L (ref 150–450)
PLATELET # BLD AUTO: 369 10E9/L (ref 150–450)
PLATELET # BLD AUTO: 374 10E9/L (ref 150–450)
PO2 BLDV: 31 MM HG (ref 25–47)
POTASSIUM SERPL-SCNC: 4.4 MMOL/L (ref 3.4–5.3)
POTASSIUM SERPL-SCNC: 4.7 MMOL/L (ref 3.4–5.3)
POTASSIUM SERPL-SCNC: 4.8 MMOL/L (ref 3.4–5.3)
POTASSIUM SERPL-SCNC: 5 MMOL/L (ref 3.4–5.3)
POTASSIUM SERPL-SCNC: 5.3 MMOL/L (ref 3.4–5.3)
POTASSIUM SERPL-SCNC: 5.6 MMOL/L (ref 3.4–5.3)
POTASSIUM SERPL-SCNC: 6 MMOL/L (ref 3.4–5.3)
PROT SERPL-MCNC: 6.8 G/DL (ref 6.8–8.8)
PROT SERPL-MCNC: 6.8 G/DL (ref 6.8–8.8)
PROT SERPL-MCNC: 7.1 G/DL (ref 6.8–8.8)
RBC # BLD AUTO: 2.73 10E12/L (ref 4.4–5.9)
RBC # BLD AUTO: 2.85 10E12/L (ref 4.4–5.9)
RBC # BLD AUTO: 3.16 10E12/L (ref 4.4–5.9)
RBC # BLD AUTO: 3.67 10E12/L (ref 4.4–5.9)
RBC # BLD AUTO: 3.71 10E12/L (ref 4.4–5.9)
SODIUM SERPL-SCNC: 138 MMOL/L (ref 133–144)
SODIUM SERPL-SCNC: 140 MMOL/L (ref 133–144)
SODIUM SERPL-SCNC: 141 MMOL/L (ref 133–144)
SODIUM SERPL-SCNC: 142 MMOL/L (ref 133–144)
SODIUM SERPL-SCNC: 143 MMOL/L (ref 133–144)
SPECIMEN EXP DATE BLD: NORMAL
SPECIMEN EXP DATE BLD: NORMAL
TRANSFUSION STATUS PATIENT QL: NORMAL
TRANSFUSION STATUS PATIENT QL: NORMAL
TROPONIN I SERPL-MCNC: 0.03 UG/L (ref 0–0.04)
TROPONIN I SERPL-MCNC: 0.04 UG/L (ref 0–0.04)
TROPONIN I SERPL-MCNC: 0.04 UG/L (ref 0–0.04)
WBC # BLD AUTO: 7.9 10E9/L (ref 4–11)
WBC # BLD AUTO: 8.9 10E9/L (ref 4–11)
WBC # BLD AUTO: 9.2 10E9/L (ref 4–11)
WBC # BLD AUTO: 9.6 10E9/L (ref 4–11)
WBC # BLD AUTO: 9.8 10E9/L (ref 4–11)

## 2019-01-01 PROCEDURE — 86901 BLOOD TYPING SEROLOGIC RH(D): CPT | Performed by: FAMILY MEDICINE

## 2019-01-01 PROCEDURE — 80048 BASIC METABOLIC PNL TOTAL CA: CPT | Performed by: FAMILY MEDICINE

## 2019-01-01 PROCEDURE — 83605 ASSAY OF LACTIC ACID: CPT | Performed by: NURSE PRACTITIONER

## 2019-01-01 PROCEDURE — 82272 OCCULT BLD FECES 1-3 TESTS: CPT | Performed by: FAMILY MEDICINE

## 2019-01-01 PROCEDURE — 36415 COLL VENOUS BLD VENIPUNCTURE: CPT | Performed by: FAMILY MEDICINE

## 2019-01-01 PROCEDURE — G0463 HOSPITAL OUTPT CLINIC VISIT: HCPCS

## 2019-01-01 PROCEDURE — 86923 COMPATIBILITY TEST ELECTRIC: CPT | Performed by: NURSE PRACTITIONER

## 2019-01-01 PROCEDURE — G0378 HOSPITAL OBSERVATION PER HR: HCPCS

## 2019-01-01 PROCEDURE — 99285 EMERGENCY DEPT VISIT HI MDM: CPT | Mod: 25 | Performed by: FAMILY MEDICINE

## 2019-01-01 PROCEDURE — 85610 PROTHROMBIN TIME: CPT | Performed by: NURSE PRACTITIONER

## 2019-01-01 PROCEDURE — 00000146 ZZHCL STATISTIC GLUCOSE BY METER IP

## 2019-01-01 PROCEDURE — 99285 EMERGENCY DEPT VISIT HI MDM: CPT | Mod: 25

## 2019-01-01 PROCEDURE — 99207 ZZC MOONLIGHTING INDICATOR: CPT | Mod: 59 | Performed by: FAMILY MEDICINE

## 2019-01-01 PROCEDURE — 85730 THROMBOPLASTIN TIME PARTIAL: CPT | Performed by: FAMILY MEDICINE

## 2019-01-01 PROCEDURE — 99220 ZZC INITIAL OBSERVATION CARE,LEVL III: CPT | Performed by: FAMILY MEDICINE

## 2019-01-01 PROCEDURE — P9016 RBC LEUKOCYTES REDUCED: HCPCS | Performed by: NURSE PRACTITIONER

## 2019-01-01 PROCEDURE — 84484 ASSAY OF TROPONIN QUANT: CPT | Performed by: NURSE PRACTITIONER

## 2019-01-01 PROCEDURE — 99207 C FOOT EXAM  NO CHARGE: CPT | Performed by: FAMILY MEDICINE

## 2019-01-01 PROCEDURE — G0180 MD CERTIFICATION HHA PATIENT: HCPCS | Performed by: FAMILY MEDICINE

## 2019-01-01 PROCEDURE — 85025 COMPLETE CBC W/AUTO DIFF WBC: CPT | Performed by: FAMILY MEDICINE

## 2019-01-01 PROCEDURE — 83036 HEMOGLOBIN GLYCOSYLATED A1C: CPT | Performed by: FAMILY MEDICINE

## 2019-01-01 PROCEDURE — 36415 COLL VENOUS BLD VENIPUNCTURE: CPT | Performed by: NURSE PRACTITIONER

## 2019-01-01 PROCEDURE — 85027 COMPLETE CBC AUTOMATED: CPT | Performed by: FAMILY MEDICINE

## 2019-01-01 PROCEDURE — 93005 ELECTROCARDIOGRAM TRACING: CPT

## 2019-01-01 PROCEDURE — 25000132 ZZH RX MED GY IP 250 OP 250 PS 637: Performed by: FAMILY MEDICINE

## 2019-01-01 PROCEDURE — 86900 BLOOD TYPING SEROLOGIC ABO: CPT | Performed by: FAMILY MEDICINE

## 2019-01-01 PROCEDURE — 71045 X-RAY EXAM CHEST 1 VIEW: CPT | Mod: TC

## 2019-01-01 PROCEDURE — 36415 COLL VENOUS BLD VENIPUNCTURE: CPT | Performed by: PEDIATRICS

## 2019-01-01 PROCEDURE — 25000128 H RX IP 250 OP 636: Performed by: NURSE PRACTITIONER

## 2019-01-01 PROCEDURE — 86901 BLOOD TYPING SEROLOGIC RH(D): CPT | Performed by: NURSE PRACTITIONER

## 2019-01-01 PROCEDURE — 80053 COMPREHEN METABOLIC PANEL: CPT | Performed by: FAMILY MEDICINE

## 2019-01-01 PROCEDURE — 25000125 ZZHC RX 250: Performed by: NURSE PRACTITIONER

## 2019-01-01 PROCEDURE — 99207 ZZC NO CHARGE NURSE ONLY: CPT

## 2019-01-01 PROCEDURE — 99495 TRANSJ CARE MGMT MOD F2F 14D: CPT | Performed by: FAMILY MEDICINE

## 2019-01-01 PROCEDURE — 84484 ASSAY OF TROPONIN QUANT: CPT | Mod: 91 | Performed by: FAMILY MEDICINE

## 2019-01-01 PROCEDURE — 86900 BLOOD TYPING SEROLOGIC ABO: CPT | Performed by: NURSE PRACTITIONER

## 2019-01-01 PROCEDURE — 99285 EMERGENCY DEPT VISIT HI MDM: CPT | Mod: Z6 | Performed by: FAMILY MEDICINE

## 2019-01-01 PROCEDURE — 25000128 H RX IP 250 OP 636: Performed by: FAMILY MEDICINE

## 2019-01-01 PROCEDURE — 86140 C-REACTIVE PROTEIN: CPT | Performed by: NURSE PRACTITIONER

## 2019-01-01 PROCEDURE — 96374 THER/PROPH/DIAG INJ IV PUSH: CPT

## 2019-01-01 PROCEDURE — 80053 COMPREHEN METABOLIC PANEL: CPT | Performed by: NURSE PRACTITIONER

## 2019-01-01 PROCEDURE — C9113 INJ PANTOPRAZOLE SODIUM, VIA: HCPCS | Performed by: NURSE PRACTITIONER

## 2019-01-01 PROCEDURE — 85025 COMPLETE CBC W/AUTO DIFF WBC: CPT | Performed by: NURSE PRACTITIONER

## 2019-01-01 PROCEDURE — 25800030 ZZH RX IP 258 OP 636: Performed by: FAMILY MEDICINE

## 2019-01-01 PROCEDURE — 99219 ZZC INITIAL OBSERVATION CARE,LEVL II: CPT | Mod: AI | Performed by: FAMILY MEDICINE

## 2019-01-01 PROCEDURE — 36430 TRANSFUSION BLD/BLD COMPNT: CPT

## 2019-01-01 PROCEDURE — 85018 HEMOGLOBIN: CPT | Mod: 91 | Performed by: PEDIATRICS

## 2019-01-01 PROCEDURE — 25000125 ZZHC RX 250: Performed by: FAMILY MEDICINE

## 2019-01-01 PROCEDURE — 71046 X-RAY EXAM CHEST 2 VIEWS: CPT | Mod: TC

## 2019-01-01 PROCEDURE — 25000132 ZZH RX MED GY IP 250 OP 250 PS 637: Performed by: NURSE PRACTITIONER

## 2019-01-01 PROCEDURE — 96361 HYDRATE IV INFUSION ADD-ON: CPT

## 2019-01-01 PROCEDURE — 99207 ZZC CDG-MDM COMPONENT: MEETS MODERATE - UP CODED: CPT | Performed by: FAMILY MEDICINE

## 2019-01-01 PROCEDURE — 85730 THROMBOPLASTIN TIME PARTIAL: CPT | Performed by: NURSE PRACTITIONER

## 2019-01-01 PROCEDURE — 82272 OCCULT BLD FECES 1-3 TESTS: CPT | Performed by: NURSE PRACTITIONER

## 2019-01-01 PROCEDURE — 86850 RBC ANTIBODY SCREEN: CPT | Performed by: NURSE PRACTITIONER

## 2019-01-01 PROCEDURE — C9113 INJ PANTOPRAZOLE SODIUM, VIA: HCPCS | Performed by: FAMILY MEDICINE

## 2019-01-01 PROCEDURE — 83735 ASSAY OF MAGNESIUM: CPT | Performed by: NURSE PRACTITIONER

## 2019-01-01 PROCEDURE — 99217 ZZC OBSERVATION CARE DISCHARGE: CPT | Performed by: PEDIATRICS

## 2019-01-01 PROCEDURE — 96365 THER/PROPH/DIAG IV INF INIT: CPT | Performed by: FAMILY MEDICINE

## 2019-01-01 PROCEDURE — 83880 ASSAY OF NATRIURETIC PEPTIDE: CPT | Performed by: NURSE PRACTITIONER

## 2019-01-01 PROCEDURE — 85610 PROTHROMBIN TIME: CPT | Performed by: FAMILY MEDICINE

## 2019-01-01 PROCEDURE — 99217 ZZC OBSERVATION CARE DISCHARGE: CPT | Performed by: FAMILY MEDICINE

## 2019-01-01 PROCEDURE — 93010 ELECTROCARDIOGRAM REPORT: CPT | Mod: Z6 | Performed by: FAMILY MEDICINE

## 2019-01-01 PROCEDURE — 99207 ZZC CDG-CODE CATEGORY CHANGED: CPT | Performed by: FAMILY MEDICINE

## 2019-01-01 PROCEDURE — 86850 RBC ANTIBODY SCREEN: CPT | Performed by: FAMILY MEDICINE

## 2019-01-01 PROCEDURE — 82803 BLOOD GASES ANY COMBINATION: CPT | Performed by: NURSE PRACTITIONER

## 2019-01-01 PROCEDURE — 94640 AIRWAY INHALATION TREATMENT: CPT

## 2019-01-01 RX ORDER — ALBUTEROL SULFATE 90 UG/1
2 AEROSOL, METERED RESPIRATORY (INHALATION) EVERY 6 HOURS PRN
Qty: 1 INHALER | Refills: 1 | Status: SHIPPED | OUTPATIENT
Start: 2019-01-01 | End: 2019-01-01

## 2019-01-01 RX ORDER — ACETAMINOPHEN 650 MG/1
650 SUPPOSITORY RECTAL EVERY 4 HOURS PRN
Status: DISCONTINUED | OUTPATIENT
Start: 2019-01-01 | End: 2019-01-01 | Stop reason: HOSPADM

## 2019-01-01 RX ORDER — NALOXONE HYDROCHLORIDE 0.4 MG/ML
.1-.4 INJECTION, SOLUTION INTRAMUSCULAR; INTRAVENOUS; SUBCUTANEOUS
Status: DISCONTINUED | OUTPATIENT
Start: 2019-01-01 | End: 2019-01-01 | Stop reason: HOSPADM

## 2019-01-01 RX ORDER — LANCETS
EACH MISCELLANEOUS
Qty: 100 EACH | Refills: 1 | Status: SHIPPED | OUTPATIENT
Start: 2019-01-01

## 2019-01-01 RX ORDER — FUROSEMIDE 20 MG
20 TABLET ORAL DAILY
Qty: 30 TABLET | Refills: 0 | Status: ON HOLD | OUTPATIENT
Start: 2019-01-01 | End: 2019-01-01

## 2019-01-01 RX ORDER — FUROSEMIDE 10 MG/ML
20 INJECTION INTRAMUSCULAR; INTRAVENOUS ONCE
Status: COMPLETED | OUTPATIENT
Start: 2019-01-01 | End: 2019-01-01

## 2019-01-01 RX ORDER — NICOTINE POLACRILEX 4 MG
15-30 LOZENGE BUCCAL
Status: DISCONTINUED | OUTPATIENT
Start: 2019-01-01 | End: 2019-01-01 | Stop reason: HOSPADM

## 2019-01-01 RX ORDER — LOSARTAN POTASSIUM 50 MG/1
100 TABLET ORAL DAILY
Status: DISCONTINUED | OUTPATIENT
Start: 2019-01-01 | End: 2019-01-01

## 2019-01-01 RX ORDER — LIDOCAINE 40 MG/G
CREAM TOPICAL
Status: DISCONTINUED | OUTPATIENT
Start: 2019-01-01 | End: 2019-01-01 | Stop reason: HOSPADM

## 2019-01-01 RX ORDER — GLIPIZIDE 5 MG/1
10 TABLET ORAL
Status: DISCONTINUED | OUTPATIENT
Start: 2019-01-01 | End: 2019-01-01 | Stop reason: HOSPADM

## 2019-01-01 RX ORDER — ONDANSETRON 2 MG/ML
4 INJECTION INTRAMUSCULAR; INTRAVENOUS EVERY 6 HOURS PRN
Status: DISCONTINUED | OUTPATIENT
Start: 2019-01-01 | End: 2019-01-01 | Stop reason: HOSPADM

## 2019-01-01 RX ORDER — HYDROCHLOROTHIAZIDE 25 MG/1
25 TABLET ORAL DAILY
Status: ON HOLD | COMMUNITY
End: 2019-01-01

## 2019-01-01 RX ORDER — FUROSEMIDE 20 MG
20 TABLET ORAL DAILY
Status: DISCONTINUED | OUTPATIENT
Start: 2019-01-01 | End: 2019-01-01 | Stop reason: HOSPADM

## 2019-01-01 RX ORDER — DEXTROSE MONOHYDRATE 25 G/50ML
25-50 INJECTION, SOLUTION INTRAVENOUS
Status: DISCONTINUED | OUTPATIENT
Start: 2019-01-01 | End: 2019-01-01 | Stop reason: HOSPADM

## 2019-01-01 RX ORDER — AMLODIPINE BESYLATE 5 MG/1
10 TABLET ORAL DAILY
Status: DISCONTINUED | OUTPATIENT
Start: 2019-01-01 | End: 2019-01-01

## 2019-01-01 RX ORDER — ALBUTEROL SULFATE 0.83 MG/ML
2.5 SOLUTION RESPIRATORY (INHALATION)
Status: DISCONTINUED | OUTPATIENT
Start: 2019-01-01 | End: 2019-01-01 | Stop reason: HOSPADM

## 2019-01-01 RX ORDER — IPRATROPIUM BROMIDE AND ALBUTEROL SULFATE 2.5; .5 MG/3ML; MG/3ML
3 SOLUTION RESPIRATORY (INHALATION) ONCE
Status: COMPLETED | OUTPATIENT
Start: 2019-01-01 | End: 2019-01-01

## 2019-01-01 RX ORDER — PANTOPRAZOLE SODIUM 40 MG/1
40 TABLET, DELAYED RELEASE ORAL
Status: DISCONTINUED | OUTPATIENT
Start: 2019-01-01 | End: 2019-01-01 | Stop reason: HOSPADM

## 2019-01-01 RX ORDER — PREDNISONE 20 MG/1
40 TABLET ORAL DAILY
Qty: 8 TABLET | Refills: 0 | Status: ON HOLD | OUTPATIENT
Start: 2019-01-01 | End: 2019-01-01

## 2019-01-01 RX ORDER — IPRATROPIUM BROMIDE AND ALBUTEROL SULFATE 2.5; .5 MG/3ML; MG/3ML
3 SOLUTION RESPIRATORY (INHALATION)
Status: DISCONTINUED | OUTPATIENT
Start: 2019-01-01 | End: 2019-01-01 | Stop reason: ALTCHOICE

## 2019-01-01 RX ORDER — PANTOPRAZOLE SODIUM 40 MG/1
40 TABLET, DELAYED RELEASE ORAL DAILY
Status: DISCONTINUED | OUTPATIENT
Start: 2019-01-01 | End: 2019-01-01 | Stop reason: HOSPADM

## 2019-01-01 RX ORDER — AMLODIPINE BESYLATE 10 MG/1
10 TABLET ORAL DAILY
Qty: 30 TABLET | Refills: 0 | Status: ON HOLD | OUTPATIENT
Start: 2019-01-01 | End: 2019-01-01

## 2019-01-01 RX ORDER — CARVEDILOL 6.25 MG/1
3.12 TABLET ORAL 2 TIMES DAILY WITH MEALS
Qty: 90 TABLET | Refills: 0 | Status: ON HOLD | OUTPATIENT
Start: 2019-01-01 | End: 2019-01-01

## 2019-01-01 RX ORDER — ACETAMINOPHEN 325 MG/1
650 TABLET ORAL EVERY 4 HOURS PRN
Status: DISCONTINUED | OUTPATIENT
Start: 2019-01-01 | End: 2019-01-01 | Stop reason: HOSPADM

## 2019-01-01 RX ORDER — ONDANSETRON 4 MG/1
4 TABLET, ORALLY DISINTEGRATING ORAL EVERY 6 HOURS PRN
Status: DISCONTINUED | OUTPATIENT
Start: 2019-01-01 | End: 2019-01-01 | Stop reason: HOSPADM

## 2019-01-01 RX ORDER — ALBUTEROL SULFATE 90 UG/1
2 AEROSOL, METERED RESPIRATORY (INHALATION) EVERY 4 HOURS PRN
Status: DISCONTINUED | OUTPATIENT
Start: 2019-01-01 | End: 2019-01-01 | Stop reason: HOSPADM

## 2019-01-01 RX ORDER — LOSARTAN POTASSIUM 50 MG/1
100 TABLET ORAL DAILY
Status: DISCONTINUED | OUTPATIENT
Start: 2019-01-01 | End: 2019-01-01 | Stop reason: HOSPADM

## 2019-01-01 RX ORDER — AMLODIPINE BESYLATE 5 MG/1
5 TABLET ORAL ONCE
Status: COMPLETED | OUTPATIENT
Start: 2019-01-01 | End: 2019-01-01

## 2019-01-01 RX ORDER — PREDNISONE 20 MG/1
40 TABLET ORAL DAILY
Status: DISCONTINUED | OUTPATIENT
Start: 2019-01-01 | End: 2019-01-01 | Stop reason: HOSPADM

## 2019-01-01 RX ORDER — AMLODIPINE BESYLATE 5 MG/1
5 TABLET ORAL DAILY
Status: DISCONTINUED | OUTPATIENT
Start: 2019-01-01 | End: 2019-01-01 | Stop reason: HOSPADM

## 2019-01-01 RX ORDER — CLOPIDOGREL BISULFATE 75 MG/1
75 TABLET ORAL DAILY
Status: DISCONTINUED | OUTPATIENT
Start: 2019-01-01 | End: 2019-01-01 | Stop reason: HOSPADM

## 2019-01-01 RX ADMIN — AMLODIPINE BESYLATE 5 MG: 5 TABLET ORAL at 10:03

## 2019-01-01 RX ADMIN — PANTOPRAZOLE SODIUM 40 MG: 40 TABLET, DELAYED RELEASE ORAL at 08:17

## 2019-01-01 RX ADMIN — LOSARTAN POTASSIUM 100 MG: 50 TABLET, FILM COATED ORAL at 10:03

## 2019-01-01 RX ADMIN — CLOPIDOGREL BISULFATE 75 MG: 75 TABLET ORAL at 10:03

## 2019-01-01 RX ADMIN — LOSARTAN POTASSIUM 100 MG: 50 TABLET, FILM COATED ORAL at 21:41

## 2019-01-01 RX ADMIN — PREDNISONE 40 MG: 20 TABLET ORAL at 21:41

## 2019-01-01 RX ADMIN — GLIPIZIDE 10 MG: 5 TABLET ORAL at 10:02

## 2019-01-01 RX ADMIN — SODIUM CHLORIDE 1000 ML: 9 INJECTION, SOLUTION INTRAVENOUS at 22:46

## 2019-01-01 RX ADMIN — AMLODIPINE BESYLATE 5 MG: 5 TABLET ORAL at 16:57

## 2019-01-01 RX ADMIN — FUROSEMIDE 20 MG: 20 TABLET ORAL at 10:04

## 2019-01-01 RX ADMIN — GLIPIZIDE 10 MG: 5 TABLET ORAL at 08:17

## 2019-01-01 RX ADMIN — IPRATROPIUM BROMIDE AND ALBUTEROL SULFATE 3 ML: .5; 3 SOLUTION RESPIRATORY (INHALATION) at 16:20

## 2019-01-01 RX ADMIN — PANTOPRAZOLE SODIUM 40 MG: 40 TABLET, DELAYED RELEASE ORAL at 10:02

## 2019-01-01 RX ADMIN — PANTOPRAZOLE SODIUM 40 MG: 40 INJECTION, POWDER, LYOPHILIZED, FOR SOLUTION INTRAVENOUS at 22:51

## 2019-01-01 RX ADMIN — PREDNISONE 40 MG: 20 TABLET ORAL at 10:02

## 2019-01-01 RX ADMIN — FUROSEMIDE 20 MG: 10 INJECTION, SOLUTION INTRAVENOUS at 17:23

## 2019-01-01 RX ADMIN — PANTOPRAZOLE SODIUM 80 MG: 40 INJECTION, POWDER, FOR SOLUTION INTRAVENOUS at 15:18

## 2019-01-01 RX ADMIN — ACETAMINOPHEN 650 MG: 325 TABLET ORAL at 10:17

## 2019-01-01 ASSESSMENT — ENCOUNTER SYMPTOMS
COUGH: 1
VOMITING: 0
LIGHT-HEADEDNESS: 0
BRUISES/BLEEDS EASILY: 1
SHORTNESS OF BREATH: 1
ARTHRALGIAS: 1
CONFUSION: 0
DIAPHORESIS: 0
HEADACHES: 0
ACTIVITY CHANGE: 1
NAUSEA: 0
ABDOMINAL PAIN: 0
FATIGUE: 1
CHILLS: 0
DYSURIA: 0
TROUBLE SWALLOWING: 0
WHEEZING: 1
ABDOMINAL DISTENTION: 0
PALPITATIONS: 0
ALLERGIC/IMMUNOLOGIC COMMENTS: AGE

## 2019-01-01 ASSESSMENT — PAIN DESCRIPTION - DESCRIPTORS
DESCRIPTORS: ACHING
DESCRIPTORS: ACHING

## 2019-01-01 ASSESSMENT — MIFFLIN-ST. JEOR
SCORE: 1599.73
SCORE: 1571
SCORE: 1624

## 2019-01-01 ASSESSMENT — PAIN SCALES - GENERAL: PAINLEVEL: MODERATE PAIN (5)

## 2019-01-07 NOTE — TELEPHONE ENCOUNTER
Reason for Call:  Form, our goal is to have forms completed with 72 hours, however, some forms may require a visit or additional information.    Type of letter, form or note:  medical    Who is the form from?: [employer] (if other please explain)    Where did the form come from: form was faxed in    What clinic location was the form placed at?: [facility] - [phone]    Where the form was placed: 's Box    What number is listed as a contact on the form?: fax [phone]       Additional comments: please complete and fax back    Call taken on 1/7/2019 at 12:31 PM by Swapna Thao

## 2019-01-07 NOTE — PROGRESS NOTES
Reason For Visit: Aston Linn, 89 year old male, seen as outpatient to evaluate and treat a right posterior heel diabetic ulcer. Referred by Dr Lopez. Patient presents with his wife Kamille today.       History: Pt with medical history that includes, Diabetes type 2, PVD who noted a new ulcer on his right posterior heel in early August of this year 2018 that was not associated with any trauma or injury.  He had increasing pain and was seen in the ED on 8/9/18.  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 Pt was seen by Dr Lopez DPKRISTEL on 10/11/18 and referred to the wound and ostomy clinic for wound recommendations.  At this time the pt is not interested in any interventions to improve arterial blood flow to the LE's. Initial visit to the Wound and Ostomy clinic was 10/15/18     Personal/social history: Pt lives with family independently.     Objective:   Physical appearance: alert and oriented  Ambulation: independent  Current treatment plan: Iodosorb paste is primary wound dressing  Last changed: yesterday     Wound #1 Right posterior heel, mixed arterial and diabetic ulcer.  Stage/tissue depth: full thickness  0.2 cm L x 0.1 cm W x 0.1 cm D  Tunneling: no  Undermining: no  Wound bed type/amount: pale red granular tissue; NA fluctuant  Wound Edges: scar tissue, no callusing today after cleaning and able to remove small amount present  Periwound: wnl  Drainage: none to scant in amount  Odor: no  Pain: yes 5/10 with any direct touch or pressure, sharp constant stabbing and burning pain.     Dorsalis Pedal Pulse: is not palpable: NA doppler: NA phasic  Hair growth: none noted below the knees  Capillary Refill: delayed approximately 5 seconds  Feet/toes color: dependent rubor  Nails: thick and brittle  R Leg: Edema nonpitting edema.  Ankle circumference Not assessed this visit cm. Calf circumference NA cm.  L Leg: Edema NA. Ankle circumference NA cm. Calf circumference NA cm.     Mobility: slow but appears normal  Current offloading/footwear: regular shoes, has diabetic shoes but does not wear  Sensation: peripheral neuropathy bilateral feet  HgbA1C: 6.3 Date: 9/6/18  Checks Blood Glucose?:  Pt checking 2 times daily states about the same, low 100s:   Other callousing/areas of concern: soft callusing and dry skin around the edges of the feet     Diet: Regular  Smoking: is a current cigarette smoker     Discussed: etiology of wound (diabetic ulcer/arterial ulcer), pathophysiology and patient specific goals for wound healing.   Education: On wound status, rational for continued use of Iodosorb, role of products in wound healing, plans for follow up and signs of infection to monitor and report to MD. Reviewed effects of diabetes and arterial insuffiencey on wound healing and increased infection risk.        Assessment:  Great amount of improvement noted today.  Wound is nearly closed with new scar tissue.  No signs of infection noted.     Barriers to wound healing:   Poor nutrition: inadequate supply of protein, carbohydrates, fatty acids, and trace elements essential for all phases of wound healing.  Did teaching on need for protein in diet for healing and need to limit salt and control blood sugars  Reduced Blood Supply: inadequate perfusion to heal wound, present due to severe arterial insuffiencey in right LE.  Medication: NA  Chemotherapy: suppresses the immune system and inflammatory response, NA  Radiotherapy: increases production of free radical which damage cells, NA  Psychological stress: NA  Obesity: decreases tissue perfusion  Infection: prolongs inflammatory phase, uses vital nutrients, impairs epithelialization and releases toxins, none currently noted, antimicrobial primary dressing is in use.  Underlying Disease: diabetes mellitis  and autoimmune disorders, present but fairly well controlled.  Maceration: reduces wound tensile strength and inhibits epithelial migration, None noted but will continue to monitor.  Patient compliance, appears motivated to control the wounds status and improve as body allows.  Unrelieved pressure, NA  Immobility, NA  Substance abuse: NA     Plan:  Continue with wound cares as follows.  1 Clean with soap and water rinse with water and dry well.  2 Apply the Iodosorb paste to the wound, use the stick of a Q tip to remove the excess.  3 Cover with a large band aid.  4 And change the dressing daily.      Topical care: Iodosorb  Offloading: try to limit time on foot and pressure to heel  Additional recommendations: none at this time     Wound Care: Wound cleansed with Microklenz and gauze, patted dry. Periwound protected with NA. Wound base filled with Iodosorb paste. Covered with large band aid, followed by NA. Secured with NA. To be changed daily.     Discussed plan of care with patient and his wife. Teaching done with patient and his wife for dressing changes; pt's wife is able and willing to perform.     The following discharge instructions were reviewed with and sent home with the patient: Unable to print AVS/discharge summary today.  Gave pt in writing on date and time of next scheduled clinic visit.       The following supplies were sent home with the patient:  None today.  Will reassess care plan in 2 weeks and order patient supplies as needed     Return visit: 1/21/19 Monday     Verbal, written, & demonstrative education provided.  Face to face time (excluding procedure): approximately 20 minutes.  Procedure: NA  Care plan was not changed.     [phone]

## 2019-01-07 NOTE — IP AVS SNAPSHOT
[address] [address]  Phone:  [phone]  Fax:  [phone]                                    After Visit Summary   1/7/2019    Aston Linn    MRN: [id number]           After Visit Summary Signature Page    I have received my discharge instructions, and my questions have been answered. I have discussed any challenges I see with this plan with the nurse or doctor.    ..........................................................................................................................................  Patient/Patient Representative Signature      ..........................................................................................................................................  Patient Representative Print Name and Relationship to Patient    ..................................................               ................................................  Date                                   Time    ..........................................................................................................................................  Reviewed by Signature/Title    ...................................................              ..............................................  Date                                               Time          22EPIC Rev 08/18

## 2019-01-07 NOTE — DISCHARGE INSTRUCTIONS
Today the wound on your right heel is greatly improved.  Only 2 mm x less than 1 mm today and it is all clean.  Continue with the use of the Iodosorb paste once daily and covering with a large band aid.  I would like for you to come back in two weeks as I believe the wound should be fully closed by then and stopping the paste once it is closed with be appropriate.    Thanks for coming in today and I will see you in two weeks.  Micheal Hammond RN [email or web address]

## 2019-01-07 NOTE — IP AVS SNAPSHOT
MRN:[id number]                      After Visit Summary   1/7/2019    Aston Linn    MRN: [id number]           Visit Information        Provider Department      1/7/2019 10:00 AM PH WOUND EXAM ROOM [facility]           Review of your medicines      UNREVIEWED medicines. Ask your doctor about these medicines       Dose / Directions   albuterol 108 (90 Base) MCG/ACT inhaler  Commonly known as:  PROAIR HFA/PROVENTIL HFA/VENTOLIN HFA  Used for:  Chronic obstructive pulmonary disease, unspecified COPD type (H)      Dose:  2 puff  Inhale 2 puffs into the lungs every 6 hours as needed for shortness of breath / dyspnea or wheezing  Quantity:  1 Inhaler  Refills:  1     amLODIPine 5 MG tablet  Commonly known as:  NORVASC  Used for:  Hypertension goal BP (blood pressure) < 130/80, CKD (chronic kidney disease) stage 4, GFR 15-29 ml/min (H)      Dose:  5 mg  Take 1 tablet (5 mg) by mouth daily  Quantity:  90 tablet  Refills:  1     ASPIRIN NOT PRESCRIBED  Commonly known as:  INTENTIONAL  Used for:  Diabetic polyneuropathy associated with type 2 diabetes mellitus (H)      Dose:  1 each  1 each continuous prn for other Antiplatelet medication not prescribed intentionally due to Uncontrolled hypertension (systolic > 180, diastolic > 100) and Current thienopryridine therapy  Quantity:  0 each  Refills:  0     glipiZIDE 10 MG tablet  Commonly known as:  GLUCOTROL      Dose:  10 mg  Take 1 tablet (10 mg) by mouth daily  Quantity:  1 tablet  Refills:  1     hydrochlorothiazide 25 MG tablet  Commonly known as:  HYDRODIURIL  Used for:  CKD (chronic kidney disease) stage 3, GFR 30-59 ml/min (H), Hypertension goal BP (blood pressure) < 130/80      Dose:  25 mg  Take 1 tablet (25 mg) by mouth daily  Quantity:  90 tablet  Refills:  1     losartan 100 MG tablet  Commonly known as:  COZAAR  Used for:  Type 2 diabetes, HbA1C goal < 8% (H), Hypertension, goal below 140/90      Dose:  100 mg  Take 1  tablet (100 mg) by mouth daily  Quantity:  90 tablet  Refills:  1     Olodaterol HCl 2.5 MCG/ACT Aers      Dose:  1 puff  Inhale 1 puff into the lungs  Refills:  0     pantoprazole 40 MG EC tablet  Commonly known as:  PROTONIX  Used for:  Gastroesophageal reflux disease without esophagitis      TAKE ONE TABLET BY MOUTH ONCE DAILY  Quantity:  90 tablet  Refills:  2     PLAVIX 75 MG tablet  Generic drug:  clopidogrel      1 TABLET DAILY  Refills:  0     STATIN NOT PRESCRIBED  Commonly known as:  INTENTIONAL  Used for:  Type 2 diabetes, HbA1C goal < 8% (H)      Statin not prescribed intentionally due to Intolerance  Quantity:  0 each  Refills:  0        CONTINUE these medicines which have NOT CHANGED       Dose / Directions   ACCU-CHEK BAL PLUS test strip  Used for:  Diabetic polyneuropathy associated with type 2 diabetes mellitus (H)  Generic drug:  blood glucose      USE TO TEST BLOOD SUGARS 1-3 TIMES DAILY AS DIRECTED.  Quantity:  100 each  Refills:  3              Protect others around you: Learn how to safely use, store and throw away your medicines at www.disposemymeds.org.       Follow-ups after your visit       Your next 10 appointments already scheduled    Jan 21, 2019 10:00 AM CST  Return Visit with PH WOUND EXAM ROOM  [facility] [address] [place] [id number] [phone]      Care Instructions       Further instructions from your care team       Today the wound on your right heel is greatly improved.  Only 2 mm x less than 1 mm today and it is all clean.  Continue with the use of the Iodosorb paste once daily and covering with a large band aid.  I would like for you to come back in two weeks as I believe the wound should be fully closed by then and stopping the paste once it is closed with be appropriate.    Thanks for coming in today and I will see you in two weeks.  Micheal Hammond RN cwocn      Additional Information About Your Visit        Care EveryWhere ID    This is your Care EveryWhere ID. This could be used by other organizations to access your [facility] medical records  [id number]        Primary Care Provider Office Phone # Fax #    Corky Yang [phone]      Equal Access to Services    [facility]: Hadii aad ku hadrosalvao Soomaali, waaxda luqadaha, qaybta kaalmada adeegyada, waxsarabjit megan sonaliliberty benítezfrank bobby [place]. So [facility] [phone].    ATENCIÓN: Si habla español, tiene a martinez disposición servicios gratuitos de asistencia lingüística. Arlen al [phone].    We comply with applicable federal civil rights laws and [place] laws. We do not discriminate on the basis of race, color, national origin, age, disability, sex, sexual orientation, or gender identity.           Thank you!    Thank you for choosing [facility] for your care. Our goal is always to provide you with excellent care. Hearing back from our patients is one way we can continue to improve our services. Please take a few minutes to complete the written survey that you may receive in the mail after you visit with us. Thank you!            Medication List      Medications          Morning Afternoon Evening Bedtime As Needed    ACCU-CHEK BAL PLUS test strip  INSTRUCTIONS:  USE TO TEST BLOOD SUGARS 1-3 TIMES DAILY AS DIRECTED.  Generic drug:  blood glucose                       ASK your doctor about these medications          Morning Afternoon Evening Bedtime As Needed    albuterol 108 (90 Base) MCG/ACT inhaler  Also known as:  PROAIR HFA/PROVENTIL HFA/VENTOLIN HFA  INSTRUCTIONS:  Inhale 2 puffs into the lungs every 6 hours as needed for shortness of breath / dyspnea or wheezing                     amLODIPine 5 MG tablet  Also known as:  NORVASC  INSTRUCTIONS:  Take 1 tablet (5 mg) by mouth daily                     ASPIRIN NOT PRESCRIBED  Also known as:  INTENTIONAL  INSTRUCTIONS:  1 each continuous prn for other Antiplatelet medication not prescribed  intentionally due to Uncontrolled hypertension (systolic > 180, diastolic > 100) and Current thienopryridine therapy                     glipiZIDE 10 MG tablet  Also known as:  GLUCOTROL  INSTRUCTIONS:  Take 1 tablet (10 mg) by mouth daily                     hydrochlorothiazide 25 MG tablet  Also known as:  HYDRODIURIL  INSTRUCTIONS:  Take 1 tablet (25 mg) by mouth daily  Doctor's comments:  ### DO NOT FILL NOW.  Please update patient's profile to reflect additional refills.  ####                     losartan 100 MG tablet  Also known as:  COZAAR  INSTRUCTIONS:  Take 1 tablet (100 mg) by mouth daily                     Olodaterol HCl 2.5 MCG/ACT Aers  INSTRUCTIONS:  Inhale 1 puff into the lungs                     pantoprazole 40 MG EC tablet  Also known as:  PROTONIX  INSTRUCTIONS:  TAKE ONE TABLET BY MOUTH ONCE DAILY                     PLAVIX 75 MG tablet  INSTRUCTIONS:  1 TABLET DAILY  Generic drug:  clopidogrel                     STATIN NOT PRESCRIBED  Also known as:  INTENTIONAL  INSTRUCTIONS:  Statin not prescribed intentionally due to Intolerance

## 2019-01-21 NOTE — DISCHARGE INSTRUCTIONS
Today it appears the wound on the right heel is closed.  I dont see any open raw tissue any longer.  I removed some of the build up of callusing and stained callus (stained from the Iodosorb).  This should allow for continued scar layers to form.  For now just wash the wound with soap and water.  Ok to cover with a band aid or leave open to air.  If you notice any new drainage keep the wound covered with a band aid daily but do not go back to using the Iodosorb paste.    I will see you again in clinic in three weeks.  Monday Feb 11 th at 10:00 am.    If you have any concerns or questions call our scheduling department at [phone] and they will direct your call.    Thanks for coming in today.  Micheal Hammond RN cwocn

## 2019-01-21 NOTE — PROGRESS NOTES
Reason For Visit: Aston Linn, 89 year old male, seen as outpatient to re evaluate and treat a right posterior heel diabetic ulcer. Referred by Dr Lopez. Patient presents with his wife Kamille today.       History: Pt with medical history that includes, Diabetes type 2, PVD who noted a new ulcer on his right posterior heel in early August of this year 2018 that was not associated with any trauma or injury.  He had increasing pain and was seen in the ED on 8/9/18.  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 Pt was seen by Dr Lopez DPKRISTEL on 10/11/18 and referred to the wound and ostomy clinic for wound recommendations.  At this time the pt is not interested in any interventions to improve arterial blood flow to the LE's. Initial visit to the Wound and Ostomy clinic was 10/15/18     Personal/social history: Pt lives with family independently.     Objective:   Physical appearance: alert and oriented  Ambulation: independent  Current treatment plan: Iodosorb paste is primary wound dressing  Last changed: yesterday     Wound #1 Right posterior heel, mixed arterial and diabetic ulcer.  Stage/tissue depth: full thickness  0 cm L x 0 cm W x 0 cm D  Tunneling: no  Undermining: no  Wound bed type/amount: appears 100 % scar covered but noted divot of the site and Iodine staining; NA fluctuant  Wound Edges: stained dry callus removed. See Assessment below for more details  Periwound: wnl  Drainage: none   Odor: no  Pain: With direct touch or pressure. Pt states I can feel it but it does not really hurt.  He has made c/o pain to his wife since our last visit stating to her 'it still hurts'.  Denies pain with walking.     Dorsalis Pedal Pulse: is not palpable: NA doppler: NA phasic  Hair growth: none noted below the knees  Capillary Refill:  delayed approximately 5 seconds  Feet/toes color: dependent rubor  Nails: thick and brittle  R Leg: Edema nonpitting edema. Ankle circumference Not assessed this visit cm. Calf circumference NA cm.  L Leg: Edema NA. Ankle circumference NA cm. Calf circumference NA cm.     Mobility: slow but appears normal  Current offloading/footwear: regular shoes, has diabetic shoes but does not wear  Sensation: peripheral neuropathy bilateral feet  HgbA1C: 6.3 Date: 9/6/18  Checks Blood Glucose?:  Pt checking 2 times daily states about the same, low 100s:   Other callousing/areas of concern: soft callusing and dry skin around the edges of the feet     Diet: Regular  Smoking: is a current cigarette smoker     Discussed: etiology of wound (diabetic ulcer/arterial ulcer), pathophysiology and patient specific goals for wound healing.   Education: On wound status, rational for stopping use of Iodosorb, new cares to assess daily and cover if more comfortable or if drainage is noted otherwise can leave open to air.        Assessment:  I no longer can see any raw open tissue in the center of the wound bed.  The is a stained center still present 0.2 cm L x 0.2 cm W that has a small divot in the center, this divot was fully cleansed out with no staining left and no open tissue noted.  No edema and no drainage noted.  No signs of infection noted.       Barriers to wound healing:   Poor nutrition: inadequate supply of protein, carbohydrates, fatty acids, and trace elements essential for all phases of wound healing.  Did teaching on need for protein in diet for healing and need to limit salt and control blood sugars  Reduced Blood Supply: inadequate perfusion to heal wound, present due to severe arterial insuffiencey in right LE.  Medication: NA  Chemotherapy: suppresses the immune system and inflammatory response, NA  Radiotherapy: increases production of free radical which damage cells, NA  Psychological stress: NA  Obesity: decreases  tissue perfusion  Infection: prolongs inflammatory phase, uses vital nutrients, impairs epithelialization and releases toxins, none currently noted, antimicrobial primary dressing is in use.  Underlying Disease: diabetes mellitis and autoimmune disorders, present but fairly well controlled.  Maceration: reduces wound tensile strength and inhibits epithelial migration, None noted but will continue to monitor.  Patient compliance, appears motivated to control the wounds status and improve as body allows.  Unrelieved pressure, NA  Immobility, NA  Substance abuse: NA     Plan:  Cleanse daily and assess for open tissue or signs of infection. Stop the use of Iodosorb paste. Ok to leave wound site open to air unless more comfortable with a band aid over the site.  If any drainage is noted cover with a band aid and still assess it daily.       Topical care: NA  Offloading: try to limit time on foot and pressure to heel  Additional recommendations: none at this time     Wound Care: Wound cleansed with Microklenz and gauze, patted dry. Periwound protected with NA. Wound base filled with NA. Covered with NA left open to air, followed by NA. Secured with NA. To be cleansed and area assessed daily.     Discussed plan of care with patient and his wife. Teaching done with patient and his wife for dressing changes; pt's wife is able and willing to perform.     The following discharge instructions were reviewed with and sent home with the patient: See discharge summary.     The following supplies were sent home with the patient:  None today.  Will reassess care plan in 3 weeks and order patient supplies as needed     Return visit: 2/11/19      Verbal, written, & demonstrative education provided.  Face to face time (excluding procedure): approximately 20 minutes.  Procedure: NA  Care plan was changed.     [phone]

## 2019-02-15 PROBLEM — R06.02 SHORTNESS OF BREATH: Status: ACTIVE | Noted: 2019-01-01

## 2019-02-15 PROBLEM — R09.02 HYPOXIA: Status: ACTIVE | Noted: 2019-01-01

## 2019-02-15 PROBLEM — N18.4 CKD (CHRONIC KIDNEY DISEASE) STAGE 4, GFR 15-29 ML/MIN (H): Status: ACTIVE | Noted: 2018-01-01

## 2019-02-15 PROBLEM — E11.40 TYPE 2 DIABETES MELLITUS WITH DIABETIC NEUROPATHY, WITHOUT LONG-TERM CURRENT USE OF INSULIN (H): Status: ACTIVE | Noted: 2018-05-07

## 2019-02-15 PROBLEM — I73.9 PERIPHERAL VASCULAR DISEASE (H): Status: ACTIVE | Noted: 2018-05-07

## 2019-02-15 NOTE — ED PROVIDER NOTES
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  Denies fever, chills, nausea and vomiting, orthopnea.  Has noted LE edema.  He is on hydrochlorothiazide.  No sick contacts.  Patient cannot recall if he took his BP medications this morning and reports he has been out of his inhaler medication.     PCP: Corky Yang     Allergies:  Allergies   Allergen Reactions     No Known Allergies        Problem List:    Patient Active Problem List    Diagnosis Date Noted     Iron deficiency anemia 10/28/2010     Priority: High     Acute blood loss anemia 10/27/2010     Priority: High     Hemorrhage of gastrointestinal tract 04/08/2004     Priority: High     Problem list name updated by automated process. 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  Document not previously scanned.  Validation form completed and sent with document to be  scanned.  Code Status needs to be updated to reflect choices in most recent ACP document. Notification sent to Dr. KAMILAH Yang  for followup.  Confirmed/documented designated decision maker(s).  Added by Karli Bueno             Tobacco use disorder 11/21/2015     Priority: Medium     Type 2 diabetes mellitus with diabetic nephropathy (H) 09/10/2012     Priority: Medium     GERD (gastroesophageal reflux disease) 05/04/2012     Priority: Medium     CKD (chronic kidney disease) stage 3, GFR 30-59 ml/min (H) 10/27/2010     Priority: Medium     COPD (chronic obstructive pulmonary disease) (H) 10/27/2010     Priority: Medium     Left carotid artery stenosis 10/27/2010     Priority: Medium     Hyperlipidemia LDL goal <100 10/27/2010     Priority: Medium     Hypertension goal BP (blood pressure) < 130/80 01/02/2003     Priority: Medium     Anxiety state 12/27/2006     Priority: Low     Problem list name updated by automated process. Provider to review       Other specified transient cerebral ischemias 02/26/2003     Priority: Low        Past Medical History:    Past Medical History:   Diagnosis Date     Aortic aneurysm (H)      Blood transfusion      Carotid artery stenosis      CKD (chronic kidney disease) stage 3, GFR 30-59 ml/min (H)      COPD (chronic obstructive pulmonary disease) (H)      Diabetic eye exam (H) 03/29/11     Diabetic eye exam (H) 02/12/2015     Emphysema of lung (H)      Hypertension      Peptic ulcer disease with hemorrhage      TIA (transient ischaemic attack)      Type II or unspecified type diabetes mellitus without mention of complication, not stated as uncontrolled        Past Surgical History:    Past Surgical History:   Procedure Laterality Date     AMPUTATION      PARTIAL LEFT INDEX FINGERTIP     APPENDECTOMY       COLONOSCOPY  11/15/2010    COMBINED COLONOSCOPY, SINGLE BIOPSY/POLYPECTOMY BY BIOPSY performed by JUDSON TOWNSEND at  GI     COLONOSCOPY  11/15/2010    COMBINED COLONOSCOPY, REMOVE  TUMOR/POLYP/LESION BY SNARE performed by JUDSON TOWNSEND at  GI     ESOPHAGOSCOPY, GASTROSCOPY, DUODENOSCOPY (EGD), COMBINED  10/28/2010    COMBINED ESOPHAGOSCOPY, GASTROSCOPY, DUODENOSCOPY (EGD) performed by MORE ROBISON at  OR     HC REMV CATARACT EXTRACAP,INSERT LENS  07/20/06    right eye     HC REMV CATARACT EXTRACAP,INSERT LENS  8/17/2006    left eye     HEAD & NECK SURGERY      CERVICAL SPINE     ORTHOPEDIC SURGERY      CERVICAL FUSION       Family History:    Family History   Problem Relation Age of Onset     Diabetes Mother      Cancer Sister         brain       Social History:  Marital Status:   [2]  Social History     Tobacco Use     Smoking status: Current Some Day Smoker     Packs/day: 0.25     Years: 60.00     Pack years: 15.00     Types: Cigarettes     Smokeless tobacco: Never Used   Substance Use Topics     Alcohol use: No     Drug use: No        Medications:      ACCU-CHEK BAL PLUS test strip   albuterol (PROAIR HFA/PROVENTIL HFA/VENTOLIN HFA) 108 (90 Base) MCG/ACT inhaler   amLODIPine (NORVASC) 5 MG tablet   ASPIRIN NOT PRESCRIBED, INTENTIONAL,   glipiZIDE (GLUCOTROL) 10 MG tablet   hydrochlorothiazide (HYDRODIURIL) 25 MG tablet   losartan (COZAAR) 100 MG tablet   Olodaterol HCl 2.5 MCG/ACT AERS   pantoprazole (PROTONIX) 40 MG EC tablet   PLAVIX 75 MG OR TABS   STATIN NOT PRESCRIBED, INTENTIONAL,         Review of Systems   Constitutional: Positive for activity change and fatigue. Negative for chills and diaphoresis.   HENT: Positive for hearing loss. Negative for congestion and trouble swallowing.    Respiratory: Positive for cough, shortness of breath and wheezing.    Cardiovascular: Positive for leg swelling. Negative for chest pain and palpitations.   Gastrointestinal: Negative for abdominal distention, abdominal pain, nausea and vomiting.   Genitourinary: Negative for dysuria.   Musculoskeletal: Positive for arthralgias.   Skin: Negative for pallor.    Allergic/Immunologic: Positive for immunocompromised state.        Age   Neurological: Negative for light-headedness and headaches.   Hematological: Bruises/bleeds easily.   Psychiatric/Behavioral: Negative for confusion.   All other systems reviewed and are negative.      Physical Exam   BP: (!) 203/103  Pulse: 90  Temp: 97.5  F (36.4  C)  Resp: 22  Weight: 99.3 kg (219 lb)  SpO2: 92 %    Physical Exam   Constitutional: He is oriented to person, place, and time. He appears well-developed and well-nourished. He is cooperative. He appears ill.   BP elevated. Will obtain EKG and give IV Labetalol for BP issues. Noted wet cough and tachypnea.    HENT:   Head: Normocephalic and atraumatic.   Mouth/Throat: Uvula is midline, oropharynx is clear and moist and mucous membranes are normal.   Eyes: Conjunctivae are normal.   Neck: Neck supple. No JVD present.   Cardiovascular: Normal rate, regular rhythm, normal heart sounds and intact distal pulses.   No murmur heard.  1-2+ pitting edema to left leg compared to 1+ on right leg   Pulmonary/Chest: Tachypnea noted. He has wheezes in the right middle field and the left middle field. He has rhonchi in the right lower field and the left lower field. He exhibits no crepitus, no edema and no retraction.   Abdominal: Soft. Normal appearance and bowel sounds are normal. He exhibits no distension, no fluid wave, no abdominal bruit and no mass. There is no hepatosplenomegaly. There is no tenderness.   Musculoskeletal: He exhibits edema.   Neurological: He is alert and oriented to person, place, and time.   Skin: Skin is warm and dry. Capillary refill takes less than 2 seconds. He is not diaphoretic.   Psychiatric: He has a normal mood and affect.   Nursing note and vitals reviewed.              EKG 12-lead, tracing only    Date/Time: 2/15/2019 5:21 PM  Performed by: Martha Freeman APRN CNP  Authorized by: Martha Freeman APRN CNP   Comparison: compared with previous  ECG from 4/20/2012  Similar to previous ECG  Comparison to previous ECG: RBBB  Rhythm: sinus rhythm and bundle branch block  Rhythm comments: RBBB  Rate: normal  BPM: 82  Conduction: right bundle branch block  ST Segments: ST segments normal  T Waves: T waves normal  Other: no other findings                         Critical Care time:  none               Results for orders placed or performed during the hospital encounter of 02/15/19 (from the past 24 hour(s))   Comprehensive metabolic panel   Result Value Ref Range    Sodium 142 133 - 144 mmol/L    Potassium 5.3 3.4 - 5.3 mmol/L    Chloride 113 (H) 94 - 109 mmol/L    Carbon Dioxide 22 20 - 32 mmol/L    Anion Gap 7 3 - 14 mmol/L    Glucose 168 (H) 70 - 99 mg/dL    Urea Nitrogen 48 (H) 7 - 30 mg/dL    Creatinine 2.17 (H) 0.66 - 1.25 mg/dL    GFR Estimate 25 (L) >60 mL/min/[1.73_m2]    GFR Estimate If Black 29 (L) >60 mL/min/[1.73_m2]    Calcium 8.0 (L) 8.5 - 10.1 mg/dL    Bilirubin Total 0.3 0.2 - 1.3 mg/dL    Albumin 3.4 3.4 - 5.0 g/dL    Protein Total 7.1 6.8 - 8.8 g/dL    Alkaline Phosphatase 107 40 - 150 U/L    ALT 14 0 - 70 U/L    AST 7 0 - 45 U/L   Troponin I   Result Value Ref Range    Troponin I ES 0.035 0.000 - 0.045 ug/L   Lactic acid whole blood   Result Value Ref Range    Lactic Acid 1.0 0.7 - 2.0 mmol/L   NT pro BNP   Result Value Ref Range    N-Terminal Pro BNP Inpatient 6,115 (H) 0 - 1,800 pg/mL   Magnesium   Result Value Ref Range    Magnesium 1.9 1.6 - 2.3 mg/dL   CBC with platelets differential   Result Value Ref Range    WBC 8.9 4.0 - 11.0 10e9/L    RBC Count 3.67 (L) 4.4 - 5.9 10e12/L    Hemoglobin 11.1 (L) 13.3 - 17.7 g/dL    Hematocrit 36.1 (L) 40.0 - 53.0 %    MCV 98 78 - 100 fl    MCH 30.2 26.5 - 33.0 pg    MCHC 30.7 (L) 31.5 - 36.5 g/dL    RDW 14.0 10.0 - 15.0 %    Platelet Count 374 150 - 450 10e9/L    Diff Method Automated Method     % Neutrophils 75.6 %    % Lymphocytes 11.7 %    % Monocytes 7.4 %    % Eosinophils 3.9 %    % Basophils 1.1  %    % Immature Granulocytes 0.3 %    Nucleated RBCs 0 0 /100    Absolute Neutrophil 6.7 1.6 - 8.3 10e9/L    Absolute Lymphocytes 1.0 0.8 - 5.3 10e9/L    Absolute Monocytes 0.7 0.0 - 1.3 10e9/L    Absolute Basophils 0.1 0.0 - 0.2 10e9/L    Abs Immature Granulocytes 0.0 0 - 0.4 10e9/L    Absolute Nucleated RBC 0.0    INR   Result Value Ref Range    INR 1.05 0.86 - 1.14   Blood gas venous   Result Value Ref Range    Ph Venous 7.33 7.32 - 7.43 pH    PCO2 Venous 41 40 - 50 mm Hg    PO2 Venous 31 25 - 47 mm Hg    Bicarbonate Venous 22 21 - 28 mmol/L    Base Deficit Venous 4.2 mmol/L    FIO2 21    Chest XR,  PA & LAT    Narrative    CHEST TWO VIEW 2/15/2019 4:35 PM     HISTORY: Wet cough, crackles, fatigue.     COMPARISON: 11/14/2015    FINDINGS: Aortic calcification. Small stable left perihilar nodule,  consistent with a benign lesion in light of the long-term stability.  Suboptimal inspiration with hypoventilatory changes. Mild right  basilar atelectasis.      Impression    IMPRESSION:  Mild left pleural effusion.       Medications   furosemide (LASIX) injection 20 mg (20 mg Intravenous Given 2/15/19 1723)   ipratropium - albuterol 0.5 mg/2.5 mg/3 mL (DUONEB) neb solution 3 mL (3 mLs Nebulization Given 2/15/19 1620)   amLODIPine (NORVASC) tablet 5 mg (5 mg Oral Given 2/15/19 1657)       Assessments & Plan (with Medical Decision Making)  89 yo male with increased fatigue, shortness of breath, and wet cough x1 week. Discussed with patient and wife concern for heart failure vs pneumonia or exacerbation of emphysema/COPD. Patient's oxygen saturation is 94% on room air.  Will proceed with duoneb, EKG, labs and chest xray. Plan to give antihypertensive medication after EKG obtained.   1715: Improved aeration post duoneb. In discussion with patient and his wife suspect mild CHF.  He reports he does not think the hydrochlorothiazide is working well for him.  He is also out of his inhaler.  Main complaint now is bilateral foot  pain which is chronic. Patient with normal lactic, WBC, and troponin WNL. EKG consistent with past EKGs showing RBBB.  Will ambulate patient with pulse ox.  Plan if maintaining oxygen saturation with ambulation to discharge.  Based on renal function will not change diuretic at this time. Will defer to his PCP.    1800: patient ambulated to bathroom and back and oxygen saturation dropped to 84%.  Patient was markedly dyspneic as well.  I discussed at length with patient admitting for hypoxia and continued observation.  Patient and his wife are agreeable to admit into the hospital.   1803: Discussed with Dr. Yang of hospitalist service who will admit for hypoxia.      I have reviewed the nursing notes.    I have reviewed the findings, diagnosis, plan and need for follow up with the patient.          Medication List      ASK your doctor about these medications    amoxicillin-clavulanate 875-125 MG tablet  Commonly known as:  AUGMENTIN  1 tablet, Oral, 2 TIMES DAILY  Ask about: Should I take this medication?            Final diagnoses:   None       2/15/2019   [facility] EMERGENCY DEPARTMENT     Martha Freeman APRN CNP  02/15/19 1807       Martha Freeman APRN CNP  02/15/19 1832

## 2019-02-15 NOTE — LETTER
Transition Communication Hand-off for Care Transitions to Next Level of Care Provider    Name: Aston Linn  : 1926  MRN #: [id number]  Primary Care Provider: Corky Yang MD  Primary Care MD Name: Dr. Corky Yang   Primary Clinic: [address] [place] [address]  Primary Care Clinic Name: [facility]   Reason for Hospitalization:  COPD (chronic obstructive pulmonary disease) (H) [J44.9]  Hypoxia [R09.02]  Elevated brain natriuretic peptide (BNP) level [R79.89]  RBBB [I45.10]  Admit Date/Time: 2/15/2019  3:48 PM  Discharge Date: 19  Payor Source: Payor: [facility] / Plan: [employer] MEDICARE / Product Type: HMO /     Readmission Assessment Measure (JENNFIER) Risk Score/category: none listed- observation status             Reason for Communication Hand-off Referral: Admission diagnoses: COPD  Fragility  Other Having home care     Discharge Plan: Return home with wife        Concern for non-adherence with plan of care:   Y/N -no   Discharge Needs Assessment:  Needs      Most Recent Value   Interventions provided  Discussed Home Care    # of Referrals Placed by [facility]  Homecare   Home Care  [facility] [phone], Fax: [phone]          Already enrolled in Tele-monitoring program and name of program:  no   Follow-up specialty is recommended: No    Follow-up plan:  No future appointments.    Any outstanding tests or procedures:        Referrals     Future Labs/Procedures    Home care nursing referral     Comments:    **Order classes of: [facility], [facility] and [facility] will route to the Home Care and Hospice Referral Pool.  Home Care or Hospice will then contact the patient to schedule their appointment.**    If you do not hear from Home Care and Hospice, or you would like to call to schedule, please call the referring place of service that your provider has listed below.  ______________________________________________________________________    Your provider has  referred you to: [facility]: [facility] [phone]   [email or web address]    Extended Emergency Contact Information  Primary Emergency Contact: Kamille iLnn  Address: [address] [address] [facility]  Home Phone: [phone]  Relation: Spouse  Secondary Emergency Contact: Mathison, Duane   [facility]  Home Phone: [phone]  Relation: Son    Patient Anticipated Discharge Date: 2/16/2019    RN, PT, HHA to begin 24 - 48 hours after discharge.  PLEASE EVALUATE AND TREAT (Evaluation timeline is 24 - 48 hrs. Please call if there is need for a variance to this timeline).    REASON FOR REFERRAL: Assessment & Treatment: PT and RN    ADDITIONAL SERVICES NEEDED: SW    OTHER PERTINENT INFORMATION: Patient was last seen by provider on 2/16/2019 for COPD exacerbation.    No current outpatient medications on file.    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     Type 2 diabetes mellitus with diabetic neuropathy, without long-term current use of insulin (H)     Peripheral vascular disease (H)     Onychodystrophy     CKD (chronic kidney disease) stage 4, GFR 15-29 ml/min (H)     Peripheral vascular disease of lower extremity with ulceration (H)     Open wound of heel, right, initial encounter     Hypoxia     Shortness of breath      Documentation of  Face to Face and Certification for Home Health Services    I certify that patient, Aston Linn is under my care and that I, or a Nurse Practitioner or Physician's Assistant working with me, had a face-to-face encounter that meets the physician face-to-face encounter requirements with this patient on: February 16, 2019.    This encounter with the patient was in whole, or in part, for the following medical condition, which is the primary reason for Home Health Care: COPD exacerbation.    I certify that, based on my findings, the following services are medically necessary Home Health Services: Nursing and Physical Therapy    My clinical findings support the need for the above services because: Nurse is needed: To assess swelling, breathing, and general pt well-being after changes in medications or other medical regimen.. and Physical Therapy Services are needed to assess and treat the following functional impairments: decreased ambulation related to peripheral arterial disease, COPD.    Further, I certify that my clinical findings support that this patient is homebound (i.e. absences from home require considerable and taxing effort and are for medical reasons or Alevism services or infrequently or of short duration when for other reasons) because: Requires assistance of another person or specialized equipment to access medical services because patient: Has prohibitive pain during ambulation..    Based on the above findings, I certify that this patient is confined to the home and needs intermittent skilled nursing care, physical therapy and/or speech therapy.  The patient is under my care, and I have initiated the establishment of the plan of care.  This patient will be followed by a physician who will periodically review the plan of care.    Physician/Provider to provide follow up care: Corky Yang    [facility] certified Physician at time of discharge: Corky Yang MD     Please be  aware that coverage of these services is subject to the terms and limitations of your health insurance plan.  Call member services at your health plan with any benefit or coverage questions.            Key Recommendations:  [facility] - RN, PT, Aide, and social work services    SWAPNIL DAO    AVS/Discharge Summary is the source of truth; this is a helpful guide for improved communication of patient story

## 2019-02-15 NOTE — ED NOTES
Upon arrival back to bed pt noted to be much more SOB, pt reported he felt more fatigued. O2 sats noted to be 84-85%.  NP updated.

## 2019-02-16 NOTE — PROGRESS NOTES
DATE: 2/16/2019    TIME OF RECEIPT FROM LAB:  0115  LAB TEST:  Troponin  LAB VALUE:  0.040  RESULTS GIVEN WITH READ-BACK TO (PROVIDER):  Dr Verma  TIME LAB VALUE REPORTED TO PROVIDER:   0120  NEW ORDERS: no new orders.

## 2019-02-16 NOTE — CONSULTS
CARE TRANSITION SOCIAL WORK INITIAL ASSESSMENT:      Met with: Patient and Family.    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 Corky Yang   Contact information and PCP information verified: Yes      ASSESSMENT  Cognitive Status: awake, alert and oriented.       Resources List: Home Care              Description of Support System: Supportive, Involved   Who is your support system?: Wife, Children   Support Assessment: Adequate family and caregiver support, Adequate social supports   Insurance Concerns: No Insurance issues identified        This writer met with patient, wife, and daughter and introduced self and role. Discussed discharge planning and medicare guidelines in regards to home care. Discussed recommendation for home RN, PT, aide and .  Pt was provided with Medicare certified home care list. Pt chooses to use [facility].  Referral being sent.       PLAN    Patient is discharging back home with his wife today.  Referral to [facility].         DENIA Miguel

## 2019-02-16 NOTE — ASSESSMENT & PLAN NOTE
Noted to be severe in the past with spirometry  Using as needed albuterol, although evidently has been out of meds for a while  Resents with a cough, hypoxemia consider a mild exacerbation

## 2019-02-16 NOTE — PROGRESS NOTES
Name: Aston Linn    MRN#: [id number]    Reason for Hospitalization: COPD (chronic obstructive pulmonary disease) (H) [J44.9]  Hypoxia [R09.02]  Elevated brain natriuretic peptide (BNP) level [R79.89]  RBBB [I45.10]    Discharge Date: 2/16/2019    Patient / Family response to discharge plan: Patient, wife, and daughter in agreement with discharge back to home today.  Referral has been sent to [facility] for RN, PT, Aide, and social work services.     Other Providers (Care Coordinator, County Services, PCA services etc): No    CTS Hand Off Completed: Yes: COPD, Fragility, and having home care     JENNIFER Score: None listed- observation status     Future Appointments: No future appointments.    Discharge Disposition: home    Discharge Planner   Discharge Plans in progress: yes. Completed.  Patient returning home with [facility] RN, PT, and Social Work services ordered by physician.   Barriers to discharge plan: no   Follow up plan: per home care and PCP        Entered by: SWAPNIL DAO 02/16/2019 2:23 PM           DENIA Miguel

## 2019-02-16 NOTE — ASSESSMENT & PLAN NOTE
Presenting with wife with concerns of increased shortness of breath associate with dry cough.  Patient has known history of severe COPD, not requiring oxygen at home.  In the ED with saturations as low as 84% with exertion.  Improved after nebs and a dose of Lasix  Registered observation, follow pulse oximetry with as needed use of oxygen  Consider that this could be CHF, although no signs on chest x-ray and just has mild peripheral edema.  BNP is elevated, but this could be secondary to his underlying CKD 4  Tonight we will manage with frequent nebs with oximetry, oxygen as needed and reevaluate tomorrow.  Due to the possibility of COPD exacerbation, will add oral steroid for the next 5 days

## 2019-02-16 NOTE — ASSESSMENT & PLAN NOTE
Rather severe with poorly healing right heel ulcer in the past  Has been evaluated by vascular surgery with no plan for surgical intervention  Continue on Plavix as doing

## 2019-02-16 NOTE — PLAN OF CARE
VSS. Afebrile. Denies any pain/discomfort.  Pt occasionally uncooperative with staff. Pt sleeping much of shift.  No complaints at this time.

## 2019-02-16 NOTE — DISCHARGE SUMMARY
[facility]  Hospitalist Discharge Summary       Date of Admission:  2/15/2019  Date of Discharge:  2/16/2019  Discharging Provider: Corky Yang MD, MD      Discharge Diagnoses   COPD exacerbation with acute respiratory failure and hypoxia    Follow-ups Needed After Discharge   Follow-up Appointments     Follow-up and recommended labs and tests       Follow up with primary care provider, Corky Yang MD, within 7 days for hospital follow- up.  The following labs/tests are recommended: BMP.           Possibl echocardiogram.    Unresulted Labs Ordered in the Past 30 Days of this Admission     No orders found for last 61 day(s).      These results will be followed up by PCP    Hospital Course   Principal Problem:    Shortness of breath (2/15/2019)  Active Problems:    Hypertension goal BP (blood pressure) < 130/80 (1/2/2003)    COPD (chronic obstructive pulmonary disease) (H) (10/27/2010)    Left carotid artery stenosis (10/27/2010)    Tobacco use disorder (11/21/2015)    ACP (advance care planning) (6/3/2016)    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 (10/14/2016)    Type 2 diabetes mellitus with diabetic neuropathy, without long-term current use of insulin (H) (5/7/2018)    Peripheral vascular disease (H) (5/7/2018)    CKD (chronic kidney disease) stage 4, GFR 15-29 ml/min (H) (9/6/2018)    Hypoxia (2/15/2019)    Hyperkalemia     Patient presented to the emergency room with increased shortness of breath over the previous several days.  Upon the time of admission for hypoxia with ambulation, it was determined the patient had been without his inhaler for several days.  He has known COPD and continues to smoke.  He was also given some Lasix in the ER given an elevated BNP.  He did have some lower extremity edema, but no jugular venous distention or crackles appreciated on exam.  Due to the edema, Lasix was continued during his hospital stay, but this was not felt  "to be secondary to heart failure.  Instead it was felt to be secondary to his COPD exacerbation given his lack of recent COPD medications.  He improved overnight with resumption of bronchodilators and prednisone.  Will discharge home on continued steroids and bronchodilators.    He does have advanced CKD and peripheral arterial disease.  Blood pressure was not well controlled during his hospital stay.  Because of this, will increase his amlodipine dose.  Follow-up in clinic in a week to see if his swelling worsens.  An attempt to minimize this, will continue Lasix.  This is been effective for him during his brief hospital stay.  Recommend follow-up BMP at his clinic visit.  He was noted to have mild hyperkalemia on the day of discharge.  This was felt to not be likely due to his CKD, as his creatinine actually decreased despite Lasix and patient was given another dose of Lasix after the mild elevation of his potassium.  In addition, patient indicated he really wanted to go home, he would not want aggressive interventions under any circumstances, and repeatedly states that he would rather \"die at home than in the hospital\".  We will follow-up his potassium in clinic next week.    Regarding his peripheral arterial disease, I have had extensive discussions with him in the past and again today regarding possible aggressive medical treatment for this.  Also discussed possible imaging studies for this.  At this time, they are not inclined to pursue additional intervention.  I did encourage smoking cessation.  We will continue to follow and assist as able.    Portions of this note were completed using Dragon dictation software.  Although reviewed, there may be typographical and other inadvertent errors that remain.       Consultations This Hospital Stay   RESPIRATORY CARE IP CONSULT    Code Status   DNR/DNI    Time Spent on this Encounter   I, Corky Yang MD, personally saw the patient today and spent greater " than 30 minutes discharging this patient.       Corky Yang MD, MD  [facility]  ______________________________________________________________________    Physical Exam   Vital Signs: Temp: 96.7  F (35.9  C) Temp src: Oral BP: 160/69 Pulse: 92   Resp: 18 SpO2: 93 % O2 Device: None (Room air)    Weight: 206 lbs 5.61 oz  Constitutional: awake, alert, cooperative, no apparent distress, and appears stated age and some difficulty with communication due to past stroke.  Hematologic / Lymphatic: no cervical lymphadenopathy  Respiratory: No increased work of breathing, globally decreased breath sounds, clear to auscultation bilaterally, no crackles or wheezing  Cardiovascular: Normal apical impulse, regular rate and rhythm, normal S1 and S2, no S3 or S4, and no murmur noted  GI: No scars, normal bowel sounds, soft, non-distended, non-tender, no masses palpated, no hepatosplenomegally  Skin: Mostly healed heel wound on the right foot.  Small scab remaining.  Musculoskeletal: 1+ pitting edema bilaterally  full range of motion noted       Primary Care Physician   Corky Yang MD    Discharge Disposition   Admited to home care:   Agency: [facility]  Discharged to home  Condition at discharge: Stable    Significant Results and Procedures   Results for orders placed or performed during the hospital encounter of 02/15/19   Chest XR,  PA & LAT    Narrative    CHEST TWO VIEW 2/15/2019 4:35 PM     HISTORY: Wet cough, crackles, fatigue.     COMPARISON: 11/14/2015    FINDINGS: Aortic calcification. Small stable left perihilar nodule,  consistent with a benign lesion in light of the long-term stability.  Suboptimal inspiration with hypoventilatory changes. Mild right  basilar atelectasis.      Impression    IMPRESSION:  Mild left pleural effusion.    NEERU ROGERS MD       Discharge Orders      Home care nursing referral      Reason for your hospital stay    COPD exacerbation     Follow-up  and recommended labs and tests     Follow up with primary care provider, Corky Yang MD, within 7 days for hospital follow- up.  The following labs/tests are recommended: BMP.     Activity    Your activity upon discharge: activity as tolerated     DNR/DNI     Diet    Follow this diet upon discharge:      Consistent Carbohydrate Diet 0916-8308 Calories: Moderate Consistent CHO (4-6 CHO units/meal)     Discharge Medications   Current Discharge Medication List      START taking these medications    Details   furosemide (LASIX) 20 MG tablet Take 1 tablet (20 mg) by mouth daily  Qty: 30 tablet, Refills: 0    Associated Diagnoses: Hypertension goal BP (blood pressure) < 130/80      predniSONE (DELTASONE) 20 MG tablet Take 40 mg by mouth daily for 4 days.  Qty: 8 tablet, Refills: 0    Comments: Please include the quantity of tablets and (strength) per dose in sig.    Associated Diagnoses: COPD exacerbation (H)         CONTINUE these medications which have CHANGED    Details   albuterol (PROAIR HFA/PROVENTIL HFA/VENTOLIN HFA) 108 (90 Base) MCG/ACT inhaler Inhale 2 puffs into the lungs every 6 hours as needed for shortness of breath / dyspnea or wheezing  Qty: 1 Inhaler, Refills: 1    Associated Diagnoses: Chronic obstructive pulmonary disease, unspecified COPD type (H); COPD exacerbation (H)      amLODIPine (NORVASC) 10 MG tablet Take 1 tablet (10 mg) by mouth daily  Qty: 30 tablet, Refills: 0    Associated Diagnoses: Hypertension goal BP (blood pressure) < 130/80; CKD (chronic kidney disease) stage 4, GFR 15-29 ml/min (H)      Olodaterol HCl 2.5 MCG/ACT AERS Inhale 1 puff into the lungs daily  Qty: 4 g, Refills: 0    Associated Diagnoses: COPD exacerbation (H)         CONTINUE these medications which have NOT CHANGED    Details   ACCU-CHEK BAL PLUS test strip USE TO TEST BLOOD SUGARS 1-3 TIMES DAILY AS DIRECTED.  Qty: 100 each, Refills: 3    Associated Diagnoses: Diabetic polyneuropathy associated with type 2  diabetes mellitus (H)      ASPIRIN NOT PRESCRIBED, INTENTIONAL, 1 each continuous prn for other Antiplatelet medication not prescribed intentionally due to Uncontrolled hypertension (systolic > 180, diastolic > 100) and Current thienopryridine therapy  Qty: 0 each, Refills: 0    Associated Diagnoses: Diabetic polyneuropathy associated with type 2 diabetes mellitus (H)      glipiZIDE (GLUCOTROL) 10 MG tablet Take 1 tablet (10 mg) by mouth daily  Qty: 1 tablet, Refills: 1      losartan (COZAAR) 100 MG tablet Take 1 tablet (100 mg) by mouth daily  Qty: 90 tablet, Refills: 1    Associated Diagnoses: Type 2 diabetes, HbA1C goal < 8% (H); Hypertension, goal below 140/90      pantoprazole (PROTONIX) 40 MG EC tablet TAKE ONE TABLET BY MOUTH ONCE DAILY  Qty: 90 tablet, Refills: 2    Associated Diagnoses: Gastroesophageal reflux disease without esophagitis      PLAVIX 75 MG OR TABS 1 TABLET DAILY      STATIN NOT PRESCRIBED, INTENTIONAL, Statin not prescribed intentionally due to Intolerance  Qty: 0 each, Refills: 0    Associated Diagnoses: Type 2 diabetes, HbA1C goal < 8% (H)         STOP taking these medications       amoxicillin-clavulanate (AUGMENTIN) 875-125 MG per tablet Comments:   Reason for Stopping:         hydrochlorothiazide (HYDRODIURIL) 25 MG tablet Comments:   Reason for Stopping:             Allergies   Allergies   Allergen Reactions     No Known Allergies

## 2019-02-16 NOTE — PROGRESS NOTES
S-(situation): Patient registered to Observation. Patient arrived to room 265 via wheelchair from ED    B-(background): Pt with increased weakness and shortness of breath.    A-(assessment): Pt's vitals /74   Pulse 79   Temp 97.3  F (36.3  C) (Oral)   Resp 20   Ht 1.829 m (6')   Wt 93.6 kg (206 lb 5.6 oz)   SpO2 92%   BMI 27.99 kg/m  upon admission to room. Daughter in law and spouse at bedside. Denies pain. Reports being hungry. Bg was 102 before meal. Pt denies shortness of breath at the time. Has psoriasis and scabbed area at back of neck and scattered on body. Right heel with scabbed wound. Right arm with scabbed abrasion. Pt hard of hearing.      R-(recommendations): Orders and observation goals reviewed with patient and pt's spouse    Nursing Observation criteria listed below was met:    Skin issues/needs documented:Yes  Isolation needs addressed, if appropriate: NA  Fall Prevention: Education given and documented: Yes  Education Assessment documented:Yes  Education Documented (Pre-existing chronic infection such as, MRSA/VRE need education on admission): Yes  OBS video/handout Reviewed & DocumentedYes in ED  Medication Reconciliation Complete: Yes  New medication patient education completed and documented (Possible Side Effects of Common Medications handout): Yes  Home medications if not able to send immediately home with family stored here: NA  Reminder note placed in discharge instructions: NA  Patient has discharge needs (If yes, please explain): Yes, pt nor pt's spouse able to state medications taken at home as well as last doses taken.

## 2019-02-16 NOTE — ED NOTES
ED Nursing criteria listed below was addressed during verbal handoff:     Abnormal vitals: Yes  Abnormal results: Yes  Med Reconciliation completed: No  Meds given in ED: Yes  Any Overdue Meds: No  Core Measures: N/A  Isolation: Yes, droplet  Special needs: Yes, assist of two  Skin assessment: Yes, Patient has sore on right heel and right arm by wrist.    Observation Patient  Education provided: Yes

## 2019-02-16 NOTE — PROGRESS NOTES
Patient alert, oriented to self, place and situation; intermittently oriented to time.  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      SR on telemetry, sats on ambulation 90-92%.  LS's course throughout that clear with coughing/deep breathing. Nonproductive cough.  BS+ x 4, multiple small formed stools with urination this morning.  Right heel wound scabbed over, skin intact, no discoloration.  Bilateral foot neuropathy, difficulty walking long distances due to pain, declines any interventions for pain.  2+ edema in ankles.      SL removed and discharged at 1441, via w/c escorted by staff and wife.  Driven home by daughter.     discharge teaching with wife and daughter.    See flowsheets for additional information.

## 2019-02-16 NOTE — H&P
[facility]    History and Physical - Hospitalist Service       Date of Admission:  2/15/2019    Assessment & Plan   Aston Linn is a 89 year old male admitted on 2/15/2019. He presents with his wife to the emergency department with concerns of weakness and increasing shortness of breath.  This is been going on for about the past week with wife noting increased leg edema.  History of severe COPD and has been out of his MDI for the past week as well.  On arrival to the emergency department he was noted to have audible wheezing with a quite elevated blood pressure.  Lab work is showing a chronic elevated creatinine.  His BNP is elevated, which could be secondary to underlying CKD.  Chest x-ray is unremarkable with a mild left pleural effusion which is chronic.  EKG shows no acute changes.  In the ER he improved after a dose of Lasix and a neb.  In the ER he was noted to have oxygen sats dropped to as low as 84% with exertion.  Wife notes because of his peripheral vascular disease and poor leg perfusion that he normally does not walk very much at home.  Patient will be registered observation, will monitor oxygen and supplement as necessary.  We will continue home meds, will change over to oral Lasix instead of hydrochlorothiazide.  We will treat for COPD exacerbation with frequent nebs as well as steroid for the next day.  Evaluate tomorrow for potential discharge    Shortness of breath  Presenting with wife with concerns of increased shortness of breath associate with dry cough.  Patient has known history of severe COPD, not requiring oxygen at home.  In the ED with saturations as low as 84% with exertion.  Improved after nebs and a dose of Lasix  Registered observation, follow pulse oximetry with as needed use of oxygen  Consider that this could be CHF, although no signs on chest x-ray and just has mild peripheral edema.  BNP is elevated, but this could be secondary to his  underlying CKD 4  Tonight we will manage with frequent nebs with oximetry, oxygen as needed and reevaluate tomorrow.  Due to the possibility of COPD exacerbation, will add oral steroid for the next 5 days    Hypoxia  Hypoxemic in the ED, dropping to 84% on room air with exertion  As above, likely due to COPD, consider mild CHF  Monitor oxygen, supplement as necessary, nebs and steroids as above    CKD (chronic kidney disease) stage 4, GFR 15-29 ml/min (H)  Chronic issue, currently stable  Monitor    COPD (chronic obstructive pulmonary disease) (H)  Noted to be severe in the past with spirometry  Using as needed albuterol, although evidently has been out of meds for a while  Resents with a cough, hypoxemia consider a mild exacerbation    Hypertension goal BP (blood pressure) < 130/80  Quite elevated in the ED, not clear if he has been taking his medications regularly, taking Norvasc and losartan at home along with daily hydrochlorothiazide  To new Norvasc and losartan.  Will change from hydrochlorthiazide to Lasix    Left carotid artery stenosis  Noted in the past, history of previous stroke  Has declined intervention    Peripheral vascular disease (H)  Rather severe with poorly healing right heel ulcer in the past  Has been evaluated by vascular surgery with no plan for surgical intervention  Continue on Plavix as doing    Type 2 diabetes mellitus with diabetic neuropathy, without long-term current use of insulin (H)  Controlled, taking daily glipizide  Continue current dosing, follow sugars    ACP (advance care planning)  DNR/DNI per her request  [place]    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  Entered: Chano Verma MD 02/15/2019, 9:10 PM     The patient's care was discussed with the Bedside Nurse, Patient and Patient's Family.    Chano Verma MD  [facility]    ______________________________________________________________________    Chief Complaint   89-year-old male presents with increasing weakness and shortness of breath    History is obtained from the patient, electronic health record, emergency department physician, patient's spouse and and daughter-in-law    History of Present Illness   Aston Linn is a 89 year old male who resents emergency department with his wife with concerns of increasing shortness of breath associated weakness.  Patient has known history of severe COPD as well as fairly severe peripheral vascular disease.  Is normally fairly sedentary at home getting up and walking only a few steps.  Over the past few days he is been more short of breath and sitting much more.  Wife notes that his legs have been may be slightly more swollen.  He said occasional dry cough.  He has history of the COPD with albuterol at home, but is not been taking this since he ran out.  Patient has PAD with a recent sore in his right foot that has for the most part healed up.  He evidently has fairly severe stenosis but declines any intervention.  His diabetes, that is well-controlled taking glipizide and he has a history of a stroke 3 years ago with persistent mild aphasia.  Denies fever, chills, nausea or vomiting or change in eating.    Review of Systems    The 10 point Review of Systems is negative other than noted in the HPI or here.     Past Medical History    I have reviewed this patient's medical history and updated it with  pertinent information if needed.   Past Medical History:   Diagnosis Date     Aortic aneurysm (H)      Blood transfusion      Carotid artery stenosis     80% lesion on left     CKD (chronic kidney disease) stage 3, GFR 30-59 ml/min (H)      COPD (chronic obstructive pulmonary disease) (H)      Diabetic eye exam (H) 03/29/11     Diabetic eye exam (H) 02/12/2015    Dr Attila Roper     Emphysema of lung (H)      Hypertension      Peptic ulcer disease with hemorrhage      TIA (transient ischaemic attack)      Type II or unspecified type diabetes mellitus without mention of complication, not stated as uncontrolled        Past Surgical History   I have reviewed this patient's surgical history and updated it with pertinent information if needed.  Past Surgical History:   Procedure Laterality Date     AMPUTATION      PARTIAL LEFT INDEX FINGERTIP     APPENDECTOMY       COLONOSCOPY  11/15/2010    COMBINED COLONOSCOPY, SINGLE BIOPSY/POLYPECTOMY BY BIOPSY performed by JUDSON TOWNSEND at  GI     COLONOSCOPY  11/15/2010    COMBINED COLONOSCOPY, REMOVE TUMOR/POLYP/LESION BY SNARE performed by JUDSON TOWNSEND at  GI     ESOPHAGOSCOPY, GASTROSCOPY, DUODENOSCOPY (EGD), COMBINED  10/28/2010    COMBINED ESOPHAGOSCOPY, GASTROSCOPY, DUODENOSCOPY (EGD) performed by MORE ROBIOSN at  OR     HC REMV CATARACT EXTRACAP,INSERT LENS  07/20/06    right eye     HC REMV CATARACT EXTRACAP,INSERT LENS  8/17/2006    left eye     HEAD & NECK SURGERY      CERVICAL SPINE     ORTHOPEDIC SURGERY      CERVICAL FUSION       Social History   I have reviewed this patient's social history and updated it with pertinent information if needed.  Social History     Tobacco Use     Smoking status: Current Some Day Smoker     Packs/day: 0.25     Years: 60.00     Pack years: 15.00     Types: Cigarettes     Smokeless tobacco: Never Used   Substance Use Topics     Alcohol use: No     Drug use: No       Family History   I have reviewed this patient's  family history and updated it with pertinent information if needed.   Family History   Problem Relation Age of Onset     Diabetes Mother      Cancer Sister         brain       Prior to Admission Medications   Prior to Admission Medications   Prescriptions Last Dose Informant Patient Reported? Taking?   ACCU-CHEK BAL PLUS test strip   No No   Sig: USE TO TEST BLOOD SUGARS 1-3 TIMES DAILY AS DIRECTED.   ASPIRIN NOT PRESCRIBED, INTENTIONAL,   No No   Si each continuous prn for other Antiplatelet medication not prescribed intentionally due to Uncontrolled hypertension (systolic > 180, diastolic > 100) and Current thienopryridine therapy   Olodaterol HCl 2.5 MCG/ACT AERS   Yes No   Sig: Inhale 1 puff into the lungs   PLAVIX 75 MG OR TABS   Yes No   Si TABLET DAILY   STATIN NOT PRESCRIBED, INTENTIONAL,   No No   Sig: Statin not prescribed intentionally due to Intolerance   albuterol (PROAIR HFA/PROVENTIL HFA/VENTOLIN HFA) 108 (90 Base) MCG/ACT inhaler   Yes No   Sig: Inhale 2 puffs into the lungs every 6 hours as needed for shortness of breath / dyspnea or wheezing   amLODIPine (NORVASC) 5 MG tablet   No No   Sig: Take 1 tablet (5 mg) by mouth daily   amoxicillin-clavulanate (AUGMENTIN) 875-125 MG per tablet   No No   Sig: Take 1 tablet by mouth 2 times daily for 7 days   glipiZIDE (GLUCOTROL) 10 MG tablet   Yes No   Sig: Take 1 tablet (10 mg) by mouth daily   hydrochlorothiazide (HYDRODIURIL) 25 MG tablet   No No   Sig: Take 1 tablet (25 mg) by mouth daily   losartan (COZAAR) 100 MG tablet   No No   Sig: Take 1 tablet (100 mg) by mouth daily   pantoprazole (PROTONIX) 40 MG EC tablet   No No   Sig: TAKE ONE TABLET BY MOUTH ONCE DAILY      Facility-Administered Medications: None     Allergies   Allergies   Allergen Reactions     No Known Allergies        Physical Exam   Vital Signs: Temp: 97.3  F (36.3  C) Temp src: Oral BP: 180/74 Pulse: 79   Resp: 20 SpO2: 92 % O2 Device: None (Room air)    Weight: 206 lbs 5.61  oz    Constitutional: awake, alert, cooperative, no apparent distress, and appears stated age  Eyes: Lids and lashes normal, pupils equal, round and reactive to light, extra ocular muscles intact, sclera clear, conjunctiva normal  ENT: Normocephalic, without obvious abnormality, atraumatic, sinuses nontender on palpation, external ears without lesions, oral pharynx with moist mucous membranes, tonsils without erythema or exudates, gums normal and good dentition.  Hematologic / Lymphatic: no cervical lymphadenopathy and no supraclavicular lymphadenopathy  Respiratory: Expiratory wheezing.  No crackles heard  Cardiovascular: Regular rate and rhythm, no murmur heard.  He has mild or leg edema up to the mid shin.  Cannot feel peripheral pulses in both his feet.  His feet are warm  GI: Abdomen soft, no tenderness  Skin: He has some dried lesions on the back of his scalp, looks excoriated.  He has a healed ulcer on the bottom of his right heel which is dry.  He has some superficial scabbing on his lower extremities.  Musculoskeletal: there is no redness, warmth, or swelling of the joints  full range of motion noted  Neurologic: Patient is awake, does not speak much, has the underlying expressive aphasia.  Does answer short sentences appropriately.  No cranial nerve deficits.  No obvious loss or weakness in his extremities.  Did not attempt to walk    Data   Data reviewed today: I reviewed all medications, new labs and imaging results over the last 24 hours. I personally reviewed the EKG tracing showing Right bundle branch block with no acute changes and the chest x-ray image(s) showing Normal size heart.  There is a small left pleural effusion.    Results for orders placed or performed during the hospital encounter of 02/15/19   Chest XR,  PA & LAT    Narrative    CHEST TWO VIEW 2/15/2019 4:35 PM     HISTORY: Wet cough, crackles, fatigue.     COMPARISON: 11/14/2015    FINDINGS: Aortic calcification. Small stable left  perihilar nodule,  consistent with a benign lesion in light of the long-term stability.  Suboptimal inspiration with hypoventilatory changes. Mild right  basilar atelectasis.      Impression    IMPRESSION:  Mild left pleural effusion.    NEERU ROGERS MD   Comprehensive metabolic panel   Result Value Ref Range    Sodium 142 133 - 144 mmol/L    Potassium 5.3 3.4 - 5.3 mmol/L    Chloride 113 (H) 94 - 109 mmol/L    Carbon Dioxide 22 20 - 32 mmol/L    Anion Gap 7 3 - 14 mmol/L    Glucose 168 (H) 70 - 99 mg/dL    Urea Nitrogen 48 (H) 7 - 30 mg/dL    Creatinine 2.17 (H) 0.66 - 1.25 mg/dL    GFR Estimate 25 (L) >60 mL/min/[1.73_m2]    GFR Estimate If Black 29 (L) >60 mL/min/[1.73_m2]    Calcium 8.0 (L) 8.5 - 10.1 mg/dL    Bilirubin Total 0.3 0.2 - 1.3 mg/dL    Albumin 3.4 3.4 - 5.0 g/dL    Protein Total 7.1 6.8 - 8.8 g/dL    Alkaline Phosphatase 107 40 - 150 U/L    ALT 14 0 - 70 U/L    AST 7 0 - 45 U/L   Troponin I   Result Value Ref Range    Troponin I ES 0.035 0.000 - 0.045 ug/L   Lactic acid whole blood   Result Value Ref Range    Lactic Acid 1.0 0.7 - 2.0 mmol/L   NT pro BNP   Result Value Ref Range    N-Terminal Pro BNP Inpatient 6,115 (H) 0 - 1,800 pg/mL   Magnesium   Result Value Ref Range    Magnesium 1.9 1.6 - 2.3 mg/dL   CBC with platelets differential   Result Value Ref Range    WBC 8.9 4.0 - 11.0 10e9/L    RBC Count 3.67 (L) 4.4 - 5.9 10e12/L    Hemoglobin 11.1 (L) 13.3 - 17.7 g/dL    Hematocrit 36.1 (L) 40.0 - 53.0 %    MCV 98 78 - 100 fl    MCH 30.2 26.5 - 33.0 pg    MCHC 30.7 (L) 31.5 - 36.5 g/dL    RDW 14.0 10.0 - 15.0 %    Platelet Count 374 150 - 450 10e9/L    Diff Method Automated Method     % Neutrophils 75.6 %    % Lymphocytes 11.7 %    % Monocytes 7.4 %    % Eosinophils 3.9 %    % Basophils 1.1 %    % Immature Granulocytes 0.3 %    Nucleated RBCs 0 0 /100    Absolute Neutrophil 6.7 1.6 - 8.3 10e9/L    Absolute Lymphocytes 1.0 0.8 - 5.3 10e9/L    Absolute Monocytes 0.7 0.0 - 1.3 10e9/L    Absolute  Basophils 0.1 0.0 - 0.2 10e9/L    Abs Immature Granulocytes 0.0 0 - 0.4 10e9/L    Absolute Nucleated RBC 0.0    INR   Result Value Ref Range    INR 1.05 0.86 - 1.14   Blood gas venous   Result Value Ref Range    Ph Venous 7.33 7.32 - 7.43 pH    PCO2 Venous 41 40 - 50 mm Hg    PO2 Venous 31 25 - 47 mm Hg    Bicarbonate Venous 22 21 - 28 mmol/L    Base Deficit Venous 4.2 mmol/L    FIO2 21    Glucose by meter   Result Value Ref Range    Glucose 102 (H) 70 - 99 mg/dL   EKG 12-lead, tracing only    Narrative    Martha Freeman APRN CNP     2/15/2019  6:09 PM  EKG 12-lead, tracing only    Date/Time: 2/15/2019 5:21 PM  Performed by: Martha Freeman APRN CNP  Authorized by: Martha Freeman APRN CNP   Comparison: compared with previous ECG from 4/20/2012  Similar to previous ECG  Comparison to previous ECG: RBBB  Rhythm: sinus rhythm and bundle branch block  Rhythm comments: RBBB  Rate: normal  BPM: 82  Conduction: right bundle branch block  ST Segments: ST segments normal  T Waves: T waves normal  Other: no other findings

## 2019-02-16 NOTE — ASSESSMENT & PLAN NOTE
Hypoxemic in the ED, dropping to 84% on room air with exertion  As above, likely due to COPD, consider mild CHF  Monitor oxygen, supplement as necessary, nebs and steroids as above

## 2019-02-16 NOTE — ASSESSMENT & PLAN NOTE
Quite elevated in the ED, not clear if he has been taking his medications regularly, taking Norvasc and losartan at home along with daily hydrochlorothiazide  To new Norvasc and losartan.  Will change from hydrochlorthiazide to Lasix

## 2019-02-16 NOTE — ED NOTES
Showed patient and wife the OBS video and neither had any questions. Patient was agreeable with the stay.

## 2019-02-18 NOTE — TELEPHONE ENCOUNTER
Spoke with patient appointment scheduled     Next 5 appointments (look out 90 days)    Feb 22, 2019  8:00 AM CST  Office Visit with Corky Yang MD  [facility] [id number] [facility] [address] [phone]        Closing encounter  Desiree Grant RT (R)

## 2019-02-18 NOTE — TELEPHONE ENCOUNTER
RN to call for hospital follow up:    Reason for follow up: Aston CHELY Linn appeared on our list for being seen in an Emergency Room or a recent Hospital discharge.    Admitting date: 02/15/2019  Discharge date: 02/16/2019  Location: [facility]  Reason for visit: Hypoxia, Elevated Brain Natriuretic Peptide (Bnp) Level    Bianca Betancourt RN, BSN

## 2019-02-18 NOTE — PROGRESS NOTES
Clinic Care Coordination Contact  Care Coordination Communication         Clinical Data: Patient was hospitalized at [facility]   Date of Admission:  2/15/2019  Date of Discharge:  2/16/2019  Discharge Diagnoses   COPD exacerbation with acute respiratory failure and hypoxia        Home Care Contact:              Home Care Agency: [facility]               Contact name () and phone number: Lauro Senior -[phone]                Patient Contact: Completed by triage. See encounter                   Plan: RN/SW Care Coordinator will await notification from home care staff informing RN/SW Care Coordinator of patients discharge plans/needs. RN/SW Care Coordinator will review chart and outreach to home care every 4 weeks and as needed.

## 2019-02-18 NOTE — PROGRESS NOTES
[facility] now requests orders and shares plan of care/discharge summaries for some patients through [employer].  Please REPLY TO THIS MESSAGE OR ROUTE BACK TO THE AUTHOR in order to give authorization for orders when needed.  This is considered a verbal order, you will still receive a faxed copy of orders for signature.  Thank you for your assistance in improving collaboration for our patients.    ORDER    1. Skilled Nursing Visits 1w1, 2w2, 1w1, 2 prn    HIGHLY Recommended PT/OT/HHA, pt declines these.     Pt is a 89 year old male admitted to home care services. Pt was reluctant to have home care services, but agrees to have nursing come in for awhile. He was hospitalized from 2/15 to 2/16/19 for COPD exacerbation with acute respiratory failure and hypoxia. Pt lives at home with his wife. They have 7 children and other family members who check in on them daily. Pt is very hard of hearing and isn't very active in our home care conversation. Pt/wife is doing a lot of the talking for him. Pt ambulates independently, w/o any assistive device. He sleeps in a regular bed with his wife. He doesn't ever take showers, and only does sponge baths. He has +2 pitting edema to BLE. He has a dry scab on right heel and a pink scab on right lower leg. He has poor circulation to BLE. Pedal pulses palpable. His current weight is 205 lbs. VS: T 97, P 80, R 19, /63, O2 97% room air. LS crackles anterior, wheezing posterior bilateral fields. He admits to have arthritic all over pain. 0 at rest, 7-8 with movement/ambulation. His Blood Sugars from most recent: 135, 258/90. States he is compliant with his medications. Declines any help with med set up by home care nurse. Pt continues to smoke cigarettes, outside. Declines to quit.     Thank you,  La Villavicencio RN  [phone]

## 2019-02-18 NOTE — TELEPHONE ENCOUNTER
Reason for Call:  Other appointment    Detailed comments: pt was seen in ED on Saturday and was told to follow up with Dr. Yang Friday or Monday. First opening is March 20. Wondering about being worked in sooner. Please advise.     Phone Number Patient can be reached at: Home number on file [phone] (home)    Best Time: any     Can we leave a detailed message on this number? YES    Call taken on 2/18/2019 at 11:20 AM by Dali Friend

## 2019-02-18 NOTE — PROGRESS NOTES
"These orders are OK.    His \"arthritis\" is likely his peripheral artery disease, which he refuses to treat.    He has previously been reluctant to take his BP medications as well.      Please ensure he's taking his COPD medications.       He doesn't talk much due to relative aphasia from a stroke from not taking his medications and smoking.    "

## 2019-02-18 NOTE — TELEPHONE ENCOUNTER
"Hospital/TCU/ED for chronic condition Discharge Protocol    \"Hi, my name is Bianca Betancourt, a registered nurse, and I am calling from [facility].  I am calling to follow up and see how things are going for you after your recent emergency visit/hospital/TCU stay.\"    Tell me how you are doing now that you are home? \"Fine.\"      Discharge Instructions    \"Let's review your discharge instructions.  What is/are the follow-up recommendations?  Pt. Response: to se DJ     \"Has an appointment with your primary care provider been scheduled?\"   will call after seeing Home Care    \"When you see the provider, I would recommend that you bring your medications with you.\"    Medications    \"Tell me what changed about your medicines when you discharged?\"    Changes to chronic meds?    0-1    \"What questions do you have about your medications?\"    None     New diagnoses of heart failure, COPD, diabetes, or MI?    No      Medication reconciliation completed? Yes  Was MTM referral placed (*Make sure to put transitions as reason for referral)?   No    Call Summary    \"What questions or concerns do you have about your recent visit and your follow-up care?\"     none    \"If you have questions or things don't continue to improve, we encourage you contact us through the main clinic number (give number).  Even if the clinic is not open, triage nurses are available 24/7 to help you.     We would like you to know that our clinic has extended hours (provide information).  We also have urgent care (provide details on closest location and hours/contact info)\"      \"Thank you for your time and take care!\"    Bianca Betancourt RN, BSN     "

## 2019-02-19 NOTE — TELEPHONE ENCOUNTER
Reason for follow up call: Aston Linn appeared on our list for being seen in and recenlty discharge from the Hospital.    Chief Complaint   Patient presents with     Hospital F/U     Hypoxia, Elevated Brain Natriuretic Peptide (Bnp) Level       Encounter routed for Clinic RN to call for follow up

## 2019-02-19 NOTE — PROGRESS NOTES
SUBJECTIVE:   Aston Linn is a 89 year old male who presents to clinic today for the following health issues:      Diabetes     Diabetes:     Frequency of checking blood sugars::  2 times a day    Diabetic concerns::  Blood sugar frequently over 200    Hypoglycemia symptoms::  None    Paraesthesia present::  YES    Eye Exam in the last year::  Yes    Diabetes Management Resources    Hypertension:     Outpatient blood pressures:  Are not being checked    Dietary sodium intake::  Not monitoring salt intake  Hypertension     Diabetes:     Frequency of checking blood sugars::  2 times a day    Diabetic concerns::  Blood sugar frequently over 200    Hypoglycemia symptoms::  None    Paraesthesia present::  YES    Eye Exam in the last year::  Yes    Diabetes Management Resources    Hypertension:     Outpatient blood pressures:  Are not being checked    Dietary sodium intake::  Not monitoring salt intake        Hospital Follow-up Visit:    Hospital/Nursing Home/IP Rehab Facility: [facility]  Date of Admission: 2/15/2019  Date of Discharge: 2/16/2019  Reason(s) for Admission: Weak in the legs.             Problems taking medications regularly:  None       Medication changes since discharge: Lasix, Prednisone        Problems adhering to non-medication therapy:  Blood sugar goes way up in 200s     Summary of hospitalization:  [facility] discharge summary reviewed  Diagnostic Tests/Treatments reviewed.  Follow up needed: none  Other Healthcare Providers Involved in Patient s Care:         Homecare  Update since discharge: improved. He's been more active.  Pt states he's not much better.  Continues to have some pain with walking from his PAD.  Oxygen saturations have improved.      Post Discharge Medication Reconciliation: discharge medications reconciled, continue medications without change.  Plan of care communicated with patient and family     Coding guidelines for this visit:  Type of Medical    Decision Making Face-to-Face Visit       within 7 Days of discharge Face-to-Face Visit        within 14 days of discharge   Moderate Complexity [id number] [id number]   High Complexity [id number]            Problem list and histories reviewed & adjusted, as indicated.  Additional history: as documented      Current Outpatient Medications   Medication Sig Dispense Refill     ACCU-CHEK BAL PLUS test strip USE TO TEST BLOOD SUGARS 1-3 TIMES DAILY AS DIRECTED. 100 each 3     albuterol (PROAIR HFA/PROVENTIL HFA/VENTOLIN HFA) 108 (90 Base) MCG/ACT inhaler Inhale 2 puffs into the lungs every 6 hours as needed for shortness of breath / dyspnea or wheezing 1 Inhaler 1     amLODIPine (NORVASC) 10 MG tablet Take 1 tablet (10 mg) by mouth daily 30 tablet 0     ASPIRIN NOT PRESCRIBED, INTENTIONAL, 1 each continuous prn for other Antiplatelet medication not prescribed intentionally due to Uncontrolled hypertension (systolic > 180, diastolic > 100) and Current thienopryridine therapy 0 each 0     furosemide (LASIX) 20 MG tablet Take 1 tablet (20 mg) by mouth daily 30 tablet 0     glipiZIDE (GLUCOTROL) 10 MG tablet Take 1 tablet (10 mg) by mouth daily 1 tablet 1     losartan (COZAAR) 100 MG tablet Take 1 tablet (100 mg) by mouth daily 90 tablet 1     Magnesium Hydroxide (MILK OF MAGNESIA PO) Take by mouth as needed       Olodaterol HCl 2.5 MCG/ACT AERS Inhale 1 puff into the lungs daily 4 g 0     pantoprazole (PROTONIX) 40 MG EC tablet TAKE ONE TABLET BY MOUTH ONCE DAILY 90 tablet 2     PLAVIX 75 MG OR TABS 1 TABLET DAILY       STATIN NOT PRESCRIBED, INTENTIONAL, Statin not prescribed intentionally due to Intolerance 0 each 0     Recent Labs   Lab Test 02/16/19  0614 02/15/19  1619 10/11/18  1511 09/06/18  0851 04/09/18 10/30/17  0948  12/02/15  1124  09/10/15  1215   A1C  --   --   --  6.3* 6.7* 6.3*   < > 6.8*  --  6.9*   LDL  --   --  87  --   --  138*  --  129*  --   --    HDL  --   --  33*  --   --  41  --  36*  --   --    TRIG  --    --  199*  --   --  174*  --  172*  --   --    ALT  --  14 12 13 21 16   < > 16   < > 15   CR 1.98* 2.17* 2.34* 2.39* 2.3* 2.51*   < > 2.61*   < > 2.00*   GFRESTIMATED 28* 25* 26* 26* 27* 24*   < > 23*   < > 32*   GFRESTBLACK 33* 29* 32* 31*  --  29*   < > 28*   < > 38*   POTASSIUM 5.6* 5.3 5.3 5.2 5.2* 5.2   < > 5.3   < > 4.8   TSH  --   --   --   --   --  2.99  --   --   --  2.51    < > = values in this interval not displayed.      BP Readings from Last 3 Encounters:   02/22/19 152/76   02/16/19 160/69   10/11/18 158/70    Wt Readings from Last 3 Encounters:   02/15/19 93.6 kg (206 lb 5.6 oz)   10/11/18 95.7 kg (211 lb)   08/23/18 95.7 kg (211 lb)                    ROS:  Constitutional, HEENT, cardiovascular, pulmonary, gi and gu systems are negative, except as otherwise noted.    OBJECTIVE:     /62   Pulse 82   Temp 96.9  F (36.1  C) (Temporal)   Resp 18   SpO2 97%   There is no height or weight on file to calculate BMI.  GENERAL: healthy, alert and no distress  NECK: no adenopathy, no asymmetry, masses, or scars and thyroid normal to palpation  RESP: lungs clear to auscultation - no rales, rhonchi or wheezes  CV: regular rate and rhythm, normal S1 S2, no S3 or S4, no murmur, click or rub, no peripheral edema and peripheral pulses strong  ABDOMEN: soft, nontender, no hepatosplenomegaly, no masses and bowel sounds normal  MS: no gross musculoskeletal defects noted, no edema  Neuro:  Incomplete aphasia, receptive and expressive.  Diabetic foot exam:  Decreased sensation in lower feet.  No current skin breakdown.  Previous ulcer is mostly healed.    Diagnostic Test Results:  Results for orders placed or performed during the hospital encounter of 02/15/19   Chest XR,  PA & LAT    Narrative    CHEST TWO VIEW 2/15/2019 4:35 PM     HISTORY: Wet cough, crackles, fatigue.     COMPARISON: 11/14/2015    FINDINGS: Aortic calcification. Small stable left perihilar nodule,  consistent with a benign lesion in light  of the long-term stability.  Suboptimal inspiration with hypoventilatory changes. Mild right  basilar atelectasis.      Impression    IMPRESSION:  Mild left pleural effusion.    NEERU ROGERS MD   Comprehensive metabolic panel   Result Value Ref Range    Sodium 142 133 - 144 mmol/L    Potassium 5.3 3.4 - 5.3 mmol/L    Chloride 113 (H) 94 - 109 mmol/L    Carbon Dioxide 22 20 - 32 mmol/L    Anion Gap 7 3 - 14 mmol/L    Glucose 168 (H) 70 - 99 mg/dL    Urea Nitrogen 48 (H) 7 - 30 mg/dL    Creatinine 2.17 (H) 0.66 - 1.25 mg/dL    GFR Estimate 25 (L) >60 mL/min/[1.73_m2]    GFR Estimate If Black 29 (L) >60 mL/min/[1.73_m2]    Calcium 8.0 (L) 8.5 - 10.1 mg/dL    Bilirubin Total 0.3 0.2 - 1.3 mg/dL    Albumin 3.4 3.4 - 5.0 g/dL    Protein Total 7.1 6.8 - 8.8 g/dL    Alkaline Phosphatase 107 40 - 150 U/L    ALT 14 0 - 70 U/L    AST 7 0 - 45 U/L   Troponin I   Result Value Ref Range    Troponin I ES 0.035 0.000 - 0.045 ug/L   Lactic acid whole blood   Result Value Ref Range    Lactic Acid 1.0 0.7 - 2.0 mmol/L   NT pro BNP   Result Value Ref Range    N-Terminal Pro BNP Inpatient 6,115 (H) 0 - 1,800 pg/mL   Magnesium   Result Value Ref Range    Magnesium 1.9 1.6 - 2.3 mg/dL   CBC with platelets differential   Result Value Ref Range    WBC 8.9 4.0 - 11.0 10e9/L    RBC Count 3.67 (L) 4.4 - 5.9 10e12/L    Hemoglobin 11.1 (L) 13.3 - 17.7 g/dL    Hematocrit 36.1 (L) 40.0 - 53.0 %    MCV 98 78 - 100 fl    MCH 30.2 26.5 - 33.0 pg    MCHC 30.7 (L) 31.5 - 36.5 g/dL    RDW 14.0 10.0 - 15.0 %    Platelet Count 374 150 - 450 10e9/L    Diff Method Automated Method     % Neutrophils 75.6 %    % Lymphocytes 11.7 %    % Monocytes 7.4 %    % Eosinophils 3.9 %    % Basophils 1.1 %    % Immature Granulocytes 0.3 %    Nucleated RBCs 0 0 /100    Absolute Neutrophil 6.7 1.6 - 8.3 10e9/L    Absolute Lymphocytes 1.0 0.8 - 5.3 10e9/L    Absolute Monocytes 0.7 0.0 - 1.3 10e9/L    Absolute Basophils 0.1 0.0 - 0.2 10e9/L    Abs Immature Granulocytes 0.0 0  - 0.4 10e9/L    Absolute Nucleated RBC 0.0    INR   Result Value Ref Range    INR 1.05 0.86 - 1.14   Blood gas venous   Result Value Ref Range    Ph Venous 7.33 7.32 - 7.43 pH    PCO2 Venous 41 40 - 50 mm Hg    PO2 Venous 31 25 - 47 mm Hg    Bicarbonate Venous 22 21 - 28 mmol/L    Base Deficit Venous 4.2 mmol/L    FIO2 21    Troponin I   Result Value Ref Range    Troponin I ES 0.040 0.000 - 0.045 ug/L   Glucose by meter   Result Value Ref Range    Glucose 102 (H) 70 - 99 mg/dL   CBC with platelets differential   Result Value Ref Range    WBC 7.9 4.0 - 11.0 10e9/L    RBC Count 3.71 (L) 4.4 - 5.9 10e12/L    Hemoglobin 11.2 (L) 13.3 - 17.7 g/dL    Hematocrit 36.5 (L) 40.0 - 53.0 %    MCV 98 78 - 100 fl    MCH 30.2 26.5 - 33.0 pg    MCHC 30.7 (L) 31.5 - 36.5 g/dL    RDW 13.7 10.0 - 15.0 %    Platelet Count 352 150 - 450 10e9/L    Diff Method Automated Method     % Neutrophils 91.6 %    % Lymphocytes 6.6 %    % Monocytes 0.9 %    % Eosinophils 0.1 %    % Basophils 0.3 %    % Immature Granulocytes 0.5 %    Nucleated RBCs 0 0 /100    Absolute Neutrophil 7.3 1.6 - 8.3 10e9/L    Absolute Lymphocytes 0.5 (L) 0.8 - 5.3 10e9/L    Absolute Monocytes 0.1 0.0 - 1.3 10e9/L    Absolute Basophils 0.0 0.0 - 0.2 10e9/L    Abs Immature Granulocytes 0.0 0 - 0.4 10e9/L    Absolute Nucleated RBC 0.0    Basic metabolic panel   Result Value Ref Range    Sodium 140 133 - 144 mmol/L    Potassium 5.6 (H) 3.4 - 5.3 mmol/L    Chloride 112 (H) 94 - 109 mmol/L    Carbon Dioxide 20 20 - 32 mmol/L    Anion Gap 8 3 - 14 mmol/L    Glucose 200 (H) 70 - 99 mg/dL    Urea Nitrogen 48 (H) 7 - 30 mg/dL    Creatinine 1.98 (H) 0.66 - 1.25 mg/dL    GFR Estimate 28 (L) >60 mL/min/[1.73_m2]    GFR Estimate If Black 33 (L) >60 mL/min/[1.73_m2]    Calcium 8.5 8.5 - 10.1 mg/dL   Troponin I   Result Value Ref Range    Troponin I ES 0.028 0.000 - 0.045 ug/L   Glucose by meter   Result Value Ref Range    Glucose 186 (H) 70 - 99 mg/dL   EKG 12-lead, tracing only     Narrative    Martha Freeman APRN CNP     2/15/2019  6:09 PM  EKG 12-lead, tracing only    Date/Time: 2/15/2019 5:21 PM  Performed by: Martha Freeman APRN CNP  Authorized by: Martha Freeman APRN CNP   Comparison: compared with previous ECG from 4/20/2012  Similar to previous ECG  Comparison to previous ECG: RBBB  Rhythm: sinus rhythm and bundle branch block  Rhythm comments: RBBB  Rate: normal  BPM: 82  Conduction: right bundle branch block  ST Segments: ST segments normal  T Waves: T waves normal  Other: no other findings       Care Transition RN/SW IP Consult    Narrative    Roseann Shelton     2/16/2019  2:29 PM  CARE TRANSITION SOCIAL WORK INITIAL ASSESSMENT:      Met with: Patient and Family.    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 Corky Yang   Contact information and PCP information verified: Yes      ASSESSMENT  Cognitive Status: awake, alert and oriented.       Resources List: Home Care              Description of Support System: Supportive, Involved   Who is your support system?: Wife, Children   Support Assessment: Adequate family and caregiver support,   Adequate social supports   Insurance Concerns: No Insurance issues identified        This writer met with patient, wife, and daughter and introduced   self and role. Discussed discharge planning and medicare   guidelines in regards to home care. Discussed recommendation for   home  RN, PT, aide and .  Pt was provided with   Medicare certified home care list. Pt chooses to use [facility].  Referral being sent.       PLAN    Patient is discharging back home with his wife today.  Referral   to [facility].         DENIA Miguel          ASSESSMENT/PLAN:     Tobacco Cessation:   reports that he has been smoking cigarettes.  He has a 15.00 pack-year smoking history. he has never used smokeless tobacco.  Tobacco Cessation Action Plan: Information offered: Patient not interested at this time        ICD-10-CM    1. COPD exacerbation (H) J44.1 COPD ACTION PLAN   2. Type 2 diabetes mellitus with diabetic nephropathy, without long-term current use of insulin (H) E11.21 HEMOGLOBIN A1C     EYE EXAM (SIMPLE-NONBILLABLE)     Basic metabolic panel   3. Peripheral vascular disease (H) I73.9    4. CKD (chronic kidney disease) stage 4, GFR 15-29 ml/min (H) N18.4    5. Type 2 diabetes mellitus with diabetic neuropathy, without long-term current use of insulin (H) E11.40    6. Tobacco use disorder F17.200 TOBACCO CESSATION ORDER FOR    7. Hypertension goal BP (blood pressure) < 130/80 I10 carvedilol (COREG) 6.25 MG tablet     Basic metabolic panel   8. 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  Patient appears to be much closer to his baseline.  He has finished his steroids.  Clarified that he is taking his inhalers.  [facility]  Continue current regimen.  Encourage smoking cessation.  Patient is not interested at this time.  2, 5.  Has been reasonably controlled.  Will check A1c today.  Continue glipizide at this time.  We did discuss possible use of metformin to help with diabetes especially given his constipation.  Patient will consider this.  This  3.  Continues to have pain, but is not interested in additional medications to treat this today.  We will continue to try to minimize risk factors.  Patient is not interested  in quitting smoking, but will try to get his blood pressures under better control today.  He is not willing to take statin medication.  4.  Has actually been improving.  We will try stopping his Lasix to see if this helps further.  If he has worsening edema, will need to resume this.  7.  Not controlled.  Will try carvedilol in place of his Lasix.  This should provide better blood pressure control.  It may also help his peripheral vascular disease slightly.  Follow-up in a few weeks to ensure improvement.  Check monitoring labs.  8.  Stable.  Makes history extremely difficult.  Also makes very difficult for patient to understand rationale for changes.    Portions of this note were completed using Dragon dictation software.  Although reviewed, there may be typographical and other inadvertent errors that remain.             Patient Instructions   Thank you for visiting [facility]    If desired, can stop lasix.  If increased swelling, then resume the medication.    To help with blood pressure, try taking Coreg instead.    Your blood pressure was high today.  Please come back in 1-4 weeks for a nurse visit blood pressure check.     If you'd like to try metformin to help with your diarrhea and blood sugar, let me know.    We'll let you know your lab results as soon as we can.     Contact us or return if questions or concerns.     Have a nice day!    Dr. Yang     Please Follow Up when indicated on the section below this one on your After-Visit Summary.      If you had imaging scheduled please refer to your radiology prep sheet.    Appointment    Date_______________     Time_____________    Day:   M TU W TH F    With____________________________    Location_________________________    If you need medication refills, please contact your pharmacy 3 days before your prescriptions runs out. If you are out of refills, your pharmacy will contact contact the clinic.    Contact us or return if questions or  concerns.     -Your Care Team:  MD Danielle Padron PA-C Joel De Haan, PA-C Elizabeth McLean, NICA Moran CNP, NICA Fernandez, CNP     General information about your clinic      Clinic hours:     Lab hours:  Phone [phone]  Monday 7:30 am-7 pm    Monday 8:30 am-6:30 pm  Tuesday-Friday 7:30 am-5 pm   Tuesday-Friday 8:30 am-4:30 pm    Pharmacy hours:  Phone [phone]  Monday 8:30 am-7pm  Tuesday-Friday 8:30am-6 pm                                     Mychart assistance [phone]    We would like to hear from you, how was your visit today?    Arlette Hill  Patient Information Supervisor   Patient Care Supervisor  [facility], [facility]  [phone]          Corky Yang MD, MD  [facility]

## 2019-02-22 NOTE — PATIENT INSTRUCTIONS
Thank you for visiting [facility]    If desired, can stop lasix.  If increased swelling, then resume the medication.    To help with blood pressure, try taking Coreg instead.    Your blood pressure was high today.  Please come back in 1-4 weeks for a nurse visit blood pressure check.     If you'd like to try metformin to help with your diarrhea and blood sugar, let me know.    We'll let you know your lab results as soon as we can.     Contact us or return if questions or concerns.     Have a nice day!    Dr. Yang     Please Follow Up when indicated on the section below this one on your After-Visit Summary.      If you had imaging scheduled please refer to your radiology prep sheet.    Appointment    Date_______________     Time_____________    Day:   M TU W TH F    With____________________________    Location_________________________    If you need medication refills, please contact your pharmacy 3 days before your prescriptions runs out. If you are out of refills, your pharmacy will contact contact the clinic.    Contact us or return if questions or concerns.     -Your Care Team:  MD Danielle Padron PA-C Joel De Haan, PA-C Elizabeth McLean, APRN CURTIS Lau, APRKAREN, CURTIS Calderon, APRN, CNP     General information about your clinic      Clinic hours:     Lab hours:  Phone [phone]  Monday 7:30 am-7 pm    Monday 8:30 am-6:30 pm  Tuesday-Friday 7:30 am-5 pm   Tuesday-Friday 8:30 am-4:30 pm    Pharmacy hours:  Phone [phone]  Monday 8:30 am-7pm  Tuesday-Friday 8:30am-6 pm                                     Mychart assistance [phone]    We would like to hear from you, how was your visit today?    Arlette Hill  Patient Information Supervisor   Patient Care Supervisor  [facility], and [facility], [place], [facility]  [phone]

## 2019-02-22 NOTE — RESULT ENCOUNTER NOTE
Don't stop your furosemide.  Your potassium is high and this will help to reduce it.  We should consider cutting your losartan dose in half to help with this as well.  I'd recommend repeat labs in 1-2 weeks to ensure improvement, sooner if you're not feeling normal.    Have a nice day!    Dr. Yang

## 2019-02-27 NOTE — TELEPHONE ENCOUNTER
Reason for Call:  Other , test strips and monitor    Detailed comments:  Patients spouse calling.  She states now he has [facility] insurance and they will not cover the test strips he was getting in the past.  She was given a number to [facility] [phone] for her provider to call to see what test strips and monitor they will cover.  Please advise.  Thank you    Phone Number Patient can be reached at: Home number on file [phone] (home)    Best Time: any    Can we leave a detailed message on this number? YES    Call taken on 2/27/2019 at 9:14 AM by Laura Antonio

## 2019-02-27 NOTE — TELEPHONE ENCOUNTER
Please call pharmacy and see what is covered and ok for [facility] to switch.  Pend for rn/provider to add to med list.    Leno Phoenix, RN, BSN

## 2019-02-27 NOTE — TELEPHONE ENCOUNTER
Spoke with [facility], One Touch Ultra Test strips and Monitor are covered through insurance, Spoke with [facility] pharmacist they will make the change  Thanks  Desiree Grant RT (R)

## 2019-02-27 NOTE — TELEPHONE ENCOUNTER
Prescription approved per [facility] Refill Protocol.  Leno Phoenix, RN, BSN

## 2019-02-28 NOTE — TELEPHONE ENCOUNTER
This patient has an appointment for lab work but does not have any orders. Please place some future orders as needed.    Thank You,  Savannah Trotter MLT (ASCP)

## 2019-03-06 NOTE — PROGRESS NOTES
Aston Lnin is a 89 year old patient who comes in today for a Blood Pressure check.  Initial BP:  /60   Pulse 90      90  Disposition: follow-up as previously indicated by provider

## 2019-03-06 NOTE — RESULT ENCOUNTER NOTE
Potassium is improved, but kidney function is slightly worse.  How are your blood pressures?  If not under control, we should see you back to discuss next steps in controlling this.    Thanks,    Dr. Yang

## 2019-03-06 NOTE — TELEPHONE ENCOUNTER
Relayed message below to patient's wife Kamille. Kamille is wondering if patient should continue to take Lasix. They were in clinic today for BP check and BP was 138/60        Notes recorded by Corky Yang MD on 3/6/2019 at 4:44 PM CST  Potassium is improved, but kidney function is slightly worse.  How are your blood pressures?  If not under control, we should see you back to discuss next steps in controlling this.    Thanks,    Dr. Yang

## 2019-03-07 NOTE — TELEPHONE ENCOUNTER
Yes.  This is helping to control his potassium levels.  They could get to a toxic level if he stops the lasix.  He should continue this, but try to increase water intake.

## 2019-03-13 NOTE — PROGRESS NOTES
Clinic Care Coordination Contact  Care Coordination Communication           Clinical Data: Patient was hospitalized at [facility]   Date of Admission:  2/15/2019  Date of Discharge:  2/16/2019  Discharge Diagnoses   COPD exacerbation with acute respiratory failure and hypoxia        Home Care Contact:              Home Care Agency: [facility]               Contact name () and phone number: Lauro Senior -[phone]                Patient Contact: Completed by triage. See encounter                    Plan: RN/SW Care Coordinator will await notification from home care staff informing RN/SW Care Coordinator of patients discharge plans/needs. RN/SW Care Coordinator will review chart and outreach to home care every 4 weeks and as needed.

## 2019-04-15 NOTE — PROGRESS NOTES
Clinic Care Coordination Contact  Care Team Conversations    Patient discharged from [facility]

## 2019-04-18 NOTE — TELEPHONE ENCOUNTER
Reason for Call:  Form, our goal is to have forms completed with 72 hours, however, some forms may require a visit or additional information.    Type of letter, form or note:  medical    Who is the form from?: [facility] (if other please explain)    Where did the form come from: form was faxed in    What clinic location was the form placed at?: [facility] - [phone]    Where the form was placed: box Box/Folder    What number is listed as a contact on the form?: [phone]       Additional comments: n/a    Call taken on 4/18/2019 at 4:19 PM by Nicole Sung

## 2019-04-27 NOTE — TELEPHONE ENCOUNTER
Can see at 7:45 on a day when I have an open 8:00 spot.  
Flagging for provider to review if you are able to work him in, there are only 15 minute spots left on your schedule.   Please advise, thanks  Desiree MCFARLAND (R)         
Reason for Call:  Other appointment    Detailed comments: pt's wife calling, Aston has been seeing Dr. Lopez and Dr. Lopez wants Aston to see Dr. Yang before his procedure on 9/14/18. DJ's first opening is in October. Please advise.     Phone Number Patient can be reached at: Cell number on file:    No relevant phone numbers on file.[phone]       Best Time: any     Can we leave a detailed message on this number? YES    Call taken on 9/4/2018 at 10:04 AM by Dali Friend      
Spoke with Kamille patient scheduled for 9/6/2018 7:45  Closing encounter  Desiree Grant RT (R)         
Awake/Cooperative/Alert

## 2019-05-02 NOTE — TELEPHONE ENCOUNTER
Reason for Call:  Form, our goal is to have forms completed with 72 hours, however, some forms may require a visit or additional information.    Type of letter, form or note:  medical    Who is the form from?: Home care    Where did the form come from: form was faxed in    What clinic location was the form placed at?: [facility] - [phone]    Where the form was placed: box Box/Folder    What number is listed as a contact on the form?: fax [phone]       Additional comments: Please complete and fax    Call taken on 5/2/2019 at 9:50 AM by Swapna Thao

## 2019-05-09 NOTE — PROGRESS NOTES
Clinic Care Coordination Contact  [facility]/Voicemail       Clinical Data: Care Coordinator Outreach  Outreach attempted x 2.  Left message on voicemail with call back information and requested return call.  Plan:  Care Coordinator will try to reach patient again in 3-5 business days.

## 2019-05-16 NOTE — LETTER
[facility]  Complex Care Plan  About Me:    Patient Name:  Aston Linn    YOB: 1926  Age:         89 year old   [facility] MRN:    [id number] Telephone Information:  Home Phone [phone]   Mobile NONE       Address:  [address] [facility] [address] Email address:  No e-mail address on record      Emergency Contact(s)    Name Relationship Lgl Grd Work Phone Home Phone Mobile Phone   1. SASHA LINN Spouse No  [phone]    2. MATHISON, DUANE Son   [phone]    3. HOLLI GAN Daughter   [phone]            Primary language:  English     needed? No   [facility]:  [phone] op. 1  Other communication barriers:    Preferred Method of Communication:  Phone  Current living arrangement: I live in a private home with spouse  Mobility Status/ Medical Equipment:      Health Maintenance  Health Maintenance Reviewed:      My Access Plan  Medical Emergency 911   Primary Clinic Line [facility] [phone]   24 Hour Appointment Line [phone] or  [phone]) (toll-free)   24 Hour Nurse Line [phone] (toll-free)   Preferred Urgent Care     Preferred Hospital [facility]  [phone]   Preferred Pharmacy [id number] - [address]     Behavioral Health Crisis Line The National Suicide Prevention Lifeline at 1-740.991.4119 or 911             My Care Team Members  Patient Care Team       Relationship Specialty Notifications Start End    Corky Yang MD PCP - General Family Practice  5/21/13     Phone: [phone] Fax: [phone] [address] [place] [address]    Corky Yang MD Assigned PCP   5/26/13     Phone: [phone] Fax: [phone] [address] [place] [address]    Bozena Zaman, RN Lead Care Coordinator Primary Care - CC Admissions 2/18/19     Phone: [phone] Fax:  [phone]                My Care Plans  Self Management and Treatment Plan  Goals and (Comments)  Goals        General    Medication 1 (pt-stated)     Notes - Note created  5/17/2019  2:39 PM by Bozena Zaman, RN    Goal Statement: I will take medications as prescribed    Measure of Success: completed  Supportive Steps to Achieve: wife verbalizes understanding   Barriers: na  Strengths: na  Date to Achieve By: life long goal   Patient's wife expressed understanding of goal: yes          Monitoring (pt-stated)     Notes - Note created  5/17/2019  2:37 PM by Bozena Zaman, RN    Goal Statement: I will call my PCP if I have worsen of my breathing , cough,  and wheezing. And or my sputum is yellow or green. Or I develop a fever.   Measure of Success: completed  Supportive Steps to Achieve: Patient's wife verbalizes understanding  Barriers: na  Strengths: desire to avoid hospitalization   Date to Achieve By: life time goal   Patient's wife expressed understanding of goal: yes                 Action Plans on File:                       Advance Care Plans/Directives Type:   Type Advanced Care Plans/Directives: DNR/DNI, Advanced Directive - On File    My Medical and Care Information  Problem List   Patient Active Problem List   Diagnosis     Hypertension goal BP (blood pressure) < 130/80     Other specified transient cerebral ischemias     Hemorrhage of gastrointestinal tract     Anxiety state     Acute blood loss anemia     CKD (chronic kidney disease) stage 3, GFR 30-59 ml/min (H)     COPD (chronic obstructive pulmonary disease) (H)     Left carotid artery stenosis     Hyperlipidemia LDL goal <100     Iron deficiency anemia     GERD (gastroesophageal reflux disease)     Type 2 diabetes mellitus with diabetic nephropathy (H)     Tobacco use disorder     ACP (advance care planning)     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    Care Coordination Start Date: No linked episodes   Frequency of Care Coordination: monthly   Form Last Updated: 05/17/2019

## 2019-05-17 NOTE — PROGRESS NOTES
"Clinic Care Coordination Contact    Clinic Care Coordination Contact  OUTREACH    Referral Information:  Referral Source: IP Report    Primary Diagnosis: COPD    Chief Complaint   Patient presents with     Clinic Care Coordination - [facility]        [place] Utilization:   Clinic Utilization  Difficulty keeping appointments:: No  Compliance Concerns: No  No-Show Concerns: No  No PCP office visit in Past Year: No  Utilization    Last refreshed: 5/16/2019  7:49 PM:  Hospital Admissions 1           Last refreshed: 5/16/2019  7:49 PM:  ED Visits 1           Last refreshed: 5/16/2019  7:49 PM:  No Show Count (past year) 0              Current as of: 5/16/2019  7:49 PM              Clinical Concerns:  Current Medical Concerns:  Patient was hospitalized at [facility] in February 2019 for  COPD.  Patient had [facility] upon hospital discharge.  Clinic Care Coordinator RN followed up with patient's wife today.  (patient was napping)   Patient's wife reports patient is \"doing well\"  She denies sob, worsening cough or change in sputum . Clinic Care Coordinator RN educated patient's wife to call PCP is sx worsen.  Educated patient's wife on the important of taking medications as rx'd.  Patient's wife verbalized understanding.   Patient plans to follow up with PCP in September 2019.   [facility]  Patient's wife said she thinks he has an upcoming appointment.     Patient's wife denies other concerns.   Patient's wife agreed to follow up call next month.   Clinic Care Coordinator RN will further discuss patient's medical issues.       Living Situation:  Current living arrangement:: I live in a private home with spouse    Psychosocial:  Informal Support system:: Spouse        Resources and Interventions:  Current Resources:    ;              Advance Care Plan/Directive  Advanced Care Plans/Directives on file:: Yes  Type Advanced Care Plans/Directives: DNR/DNI, Advanced " Directive - On File  Advanced Care Plan/Directive Status: Not Applicable          Goals:   Goals        General    Medication 1 (pt-stated)     Notes - Note created  5/17/2019  2:39 PM by Bozena Zaman RN    Goal Statement: I will take medications as prescribed    Measure of Success: completed  Supportive Steps to Achieve: wife verbalizes understanding   Barriers: na  Strengths: na  Date to Achieve By: life long goal   Patient's wife expressed understanding of goal: yes          Monitoring (pt-stated)     Notes - Note created  5/17/2019  2:37 PM by Bozena Zaman RN    Goal Statement: I will call my PCP if I have worsen of my breathing , cough,  and wheezing. And or my sputum is yellow or green. Or I develop a fever.   Measure of Success: completed  Supportive Steps to Achieve: Patient's wife verbalizes understanding  Barriers: na  Strengths: desire to avoid hospitalization   Date to Achieve By: life time goal   Patient's wife expressed understanding of goal: yes                  Patient/Caregiver understanding: yes    Outreach Frequency: monthly      Plan: Clinic Care Coordinator RN will follow up next month.

## 2019-07-08 NOTE — TELEPHONE ENCOUNTER
Pending Prescriptions:                       Disp   Refills    blood glucose (ONETOUCH ULTRA) test strip*100 ea*1            Sig: USE TO TEST BLOOD SUGAR THREE TIMES DAILY    Prescription approved per [facility] Refill Protocol.    Reina Carlisle, RN, BSN

## 2019-08-11 NOTE — ED PROVIDER NOTES
[facility] ED Provider Note   Patient: Aston Linn  MRN #:  [id number]  Date of Visit: August 11, 2019    CC:     Chief Complaint   Patient presents with     Rectal Bleeding     HPI:  Aston Linn is a 89 year old male who presented to the emergency department by EMS with concerns for possible GI bleed.  Patient apparently was constipated, and took a milk of magnesia 2 nights ago.  He had a hard bowel movement, and had a few drops of bright red blood in the toilet.  Over the last couple of days, he has had some ongoing bright red blood.  Today he had some black stools.  There is been some increasing weakness.  Patient denies any significant abdominal pain, nausea, vomiting, hematemesis or coffee-ground emesis.  Patient is not on any Pepto-Bismol or iron.  He has a distant history of peptic ulcer disease.  Patient had a history of a stroke 3 years ago, and has been on Plavix since then.  He has peripheral artery disease especially in the right leg.  Patient is a smoker and continues to smoke 4 cigarettes/day.  He has a history of COPD.  Other chronic health problems include diabetes mellitus, chronic kidney disease, GERD, hypertension and hyperlipidemia.    Problem List:  Patient Active Problem List    Diagnosis Date Noted     Iron deficiency anemia 10/28/2010     Priority: High     Acute blood loss anemia 10/27/2010     Priority: High     Hemorrhage of gastrointestinal tract 04/08/2004     Priority: High     Problem list name updated by automated process. 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  Document not previously scanned.  Validation form completed and sent with document to be scanned.  Code Status needs to be updated to reflect choices in most recent ACP document. Notification sent to Dr. KAMILAH Yang  for followup.  Confirmed/documented designated decision maker(s).  Added by Karli Bueno             Tobacco use disorder 11/21/2015     Priority: Medium     Type 2 diabetes mellitus with diabetic nephropathy (H) 09/10/2012     Priority: Medium     GERD (gastroesophageal reflux disease) 05/04/2012     Priority: Medium     CKD (chronic kidney disease) stage 3, GFR 30-59 ml/min (H) 10/27/2010     Priority: Medium     COPD (chronic obstructive pulmonary disease) (H) 10/27/2010     Priority: Medium     Left carotid artery stenosis 10/27/2010     Priority: Medium     Hyperlipidemia LDL goal <100 10/27/2010     Priority: Medium     Hypertension goal BP (blood pressure) < 130/80 01/02/2003     Priority: Medium     Anxiety state 12/27/2006     Priority: Low     Problem list name updated by automated process. Provider to review       Other specified transient cerebral ischemias 02/26/2003     Priority: Low       Past Medical History:   Diagnosis Date     Aortic aneurysm (H)      Blood transfusion      Carotid artery stenosis      CKD (chronic kidney disease) stage 3, GFR 30-59 ml/min (H)      COPD (chronic obstructive pulmonary disease) (H)      Diabetic eye exam (H)  03/29/11     Diabetic eye exam (H) 02/12/2015     Emphysema of lung (H)      Hypertension      Peptic ulcer disease with hemorrhage      TIA (transient ischaemic attack)      Type II or unspecified type diabetes mellitus without mention of complication, not stated as uncontrolled        MEDS:     ACCU-CHEK BAL PLUS test strip   albuterol (PROAIR HFA/PROVENTIL HFA/VENTOLIN HFA) 108 (90 Base) MCG/ACT inhaler   amLODIPine (NORVASC) 10 MG tablet   ASPIRIN NOT PRESCRIBED, INTENTIONAL,   blood glucose (ONETOUCH ULTRA) test strip   blood glucose monitoring (NO BRAND SPECIFIED) meter device kit   carvedilol (COREG) 6.25 MG tablet   furosemide (LASIX) 20 MG tablet   glipiZIDE (GLUCOTROL) 10 MG tablet   losartan (COZAAR) 100 MG tablet   Magnesium Hydroxide (MILK OF MAGNESIA PO)   pantoprazole (PROTONIX) 40 MG EC tablet   PLAVIX 75 MG OR TABS   STATIN NOT PRESCRIBED, INTENTIONAL,   thin (NO BRAND SPECIFIED) lancets       ALLERGIES:    Allergies   Allergen Reactions     No Known Allergies        Past Surgical History:   Procedure Laterality Date     AMPUTATION      PARTIAL LEFT INDEX FINGERTIP     APPENDECTOMY       COLONOSCOPY  11/15/2010    COMBINED COLONOSCOPY, SINGLE BIOPSY/POLYPECTOMY BY BIOPSY performed by JUDSON TOWNSEND at  GI     COLONOSCOPY  11/15/2010    COMBINED COLONOSCOPY, REMOVE TUMOR/POLYP/LESION BY SNARE performed by JUDSON TOWNSEND at  GI     ESOPHAGOSCOPY, GASTROSCOPY, DUODENOSCOPY (EGD), COMBINED  10/28/2010    COMBINED ESOPHAGOSCOPY, GASTROSCOPY, DUODENOSCOPY (EGD) performed by MORE ROBISON at  OR      REMV CATARACT EXTRACAP,INSERT LENS  07/20/06    right eye     HC REMV CATARACT EXTRACAP,INSERT LENS  8/17/2006    left eye     HEAD & NECK SURGERY      CERVICAL SPINE     ORTHOPEDIC SURGERY      CERVICAL FUSION       Social History     Tobacco Use     Smoking status: Current Some Day Smoker     Packs/day: 0.25     Years: 60.00     Pack years: 15.00     Types: Cigarettes     Smokeless  tobacco: Never Used   Substance Use Topics     Alcohol use: No     Drug use: No         Review of Systems   Except as noted in HPI, all other systems were reviewed and are negative    Physical Exam     Vitals were reviewed  Patient Vitals for the past 12 hrs:   BP Temp Temp src Pulse Resp SpO2 Weight   08/11/19 1430 (!) 176/75 -- -- 82 -- 96 % --   08/11/19 1400 (!) 166/72 -- -- 81 -- 95 % --   08/11/19 1330 (!) 159/74 -- -- 83 -- 96 % --   08/11/19 1300 -- -- -- -- -- 90 % --   08/11/19 1247 (!) 177/74 97.6  F (36.4  C) Oral 91 22 95 % 91.9 kg (202 lb 9.6 oz)     GENERAL APPEARANCE: Alert and oriented x3, no acute distress  FACE: normal facies  EYES: Pupils are equal  HENT: normal external exam  NECK: no adenopathy or asymmetry  RESP: normal respiratory effort; clear breath sounds bilaterally  CV: regular rate and rhythm; no significant murmurs, gallops or rubs  ABD: soft, epigastric tenderness; no rebound or guarding; bowel sounds are normal  RECTAL: Some dried bright red blood near the anal region.  No stool in the rectal vault.  There is some pink blood on the tip of the finger.  MS: no gross deformities noted; normal muscle tone.  EXT: No calf tenderness or pitting edema  SKIN: no worrisome rash  NEURO: no facial droop; no focal deficits, speech is normal        Available Lab/Imaging Results     Results for orders placed or performed during the hospital encounter of 08/11/19 (from the past 24 hour(s))   Comprehensive metabolic panel   Result Value Ref Range    Sodium 143 133 - 144 mmol/L    Potassium 4.8 3.4 - 5.3 mmol/L    Chloride 111 (H) 94 - 109 mmol/L    Carbon Dioxide 21 20 - 32 mmol/L    Anion Gap 11 3 - 14 mmol/L    Glucose 113 (H) 70 - 99 mg/dL    Urea Nitrogen 86 (H) 7 - 30 mg/dL    Creatinine 2.51 (H) 0.66 - 1.25 mg/dL    GFR Estimate 21 (L) >60 mL/min/[1.73_m2]    GFR Estimate If Black 25 (L) >60 mL/min/[1.73_m2]    Calcium 8.3 (L) 8.5 - 10.1 mg/dL    Bilirubin Total 0.2 0.2 - 1.3 mg/dL    Albumin  3.4 3.4 - 5.0 g/dL    Protein Total 6.8 6.8 - 8.8 g/dL    Alkaline Phosphatase 100 40 - 150 U/L    ALT 11 0 - 70 U/L    AST 11 0 - 45 U/L   INR   Result Value Ref Range    INR 0.99 0.86 - 1.14   Partial thromboplastin time   Result Value Ref Range    PTT 20 (L) 22 - 37 sec   Occult blood stool   Result Value Ref Range    Occult Blood Positive (A) NEG^Negative   CBC with platelets differential   Result Value Ref Range    WBC 9.2 4.0 - 11.0 10e9/L    RBC Count 2.85 (L) 4.4 - 5.9 10e12/L    Hemoglobin 8.0 (L) 13.3 - 17.7 g/dL    Hematocrit 25.9 (L) 40.0 - 53.0 %    MCV 91 78 - 100 fl    MCH 28.1 26.5 - 33.0 pg    MCHC 30.9 (L) 31.5 - 36.5 g/dL    RDW 14.6 10.0 - 15.0 %    Platelet Count 356 150 - 450 10e9/L    Diff Method Automated Method     % Neutrophils 80.8 %    % Lymphocytes 9.1 %    % Monocytes 5.1 %    % Eosinophils 3.8 %    % Basophils 0.8 %    % Immature Granulocytes 0.4 %    Nucleated RBCs 0 0 /100    Absolute Neutrophil 7.4 1.6 - 8.3 10e9/L    Absolute Lymphocytes 0.8 0.8 - 5.3 10e9/L    Absolute Monocytes 0.5 0.0 - 1.3 10e9/L    Absolute Basophils 0.1 0.0 - 0.2 10e9/L    Abs Immature Granulocytes 0.0 0 - 0.4 10e9/L    Absolute Nucleated RBC 0.0    ABO/Rh type and screen   Result Value Ref Range    ABO A     RH(D) Pos     Antibody Screen Neg     Test Valid Only At [facility]        Specimen Expires 08/14/2019    XR Chest Port 1 View    Narrative    PORTABLE CHEST ONE VIEW    8/11/2019 3:01 PM     HISTORY: Persistent cough.    COMPARISON: Chest x-ray 2/15/2019.      Impression    IMPRESSION: Portable view of the chest is performed. Probable COPD  with bullous changes in the lung apices. No infiltrate or  consolidation. Heart is normal in size. No pneumothorax or pleural  effusions.    LATANYA CAMPOVERDE MD                Impression     Final diagnoses:   Gastrointestinal hemorrhage, melena and BRB         ED Course & Medical Decision Making   Aston Linn is a 89 year old male who presented to  the emergency department by EMS with concern for GI bleed.  Patient had constipation for several days, took milk of magnesia to days ago, and had a hard bowel movement the last couple of days.  This was associated with some bright red blood with stooling.  Since this morning he has had dark black stools per report from the patient wife.  Patient does not usually look and was not aware of any symptoms.  He does have some abdominal discomfort and a prior history of peptic ulcer disease.  He is currently on Plavix for stroke that he had 3 years ago.  He does not have any associated chest pain or shortness of breath.  Previous peptic ulcer disease was in 2004 and he was hospitalized at [facility].  Vital signs reveal a temp of 97.6, blood pressure of 177/74, heart rate of 91.  Exam is noted above.  He appears pale, has some epigastric discomfort, and on rectal exam had some bright red dried blood around the rectal area.  Digital exam revealed no stool but there was some pink color to the fingertip.  This is Hemoccult positive.  Laboratory work-up reveals a white blood count of 9.2, hemoglobin of 8.0, platelet count of 356.  The patient's previous hemoglobin was 11.2 back in February 2019.  His competence of metabolic panel reveals normal electrolytes.  His urea nitrogen is 86, and creatinine is 2.51.  This is compared to a previous level of 48 4 urea nitrogen and 1.98 on the creatinine from February.  Patient is hemodynamically stable.  He has not had any further GI bleeding here in the emergency department.  I discussed the need for further diagnostic work-up, and the patient will need to go to a higher level of care.  Patient [facility] but the patient and his wife does not want to go toward the [facility].  They would prefer to go to [facility].  I discussed the case with Dr. Simons, the hospitalist at [facility] who has graciously accepted care for the patient.  Patient will begin Protonix  80 mg IV in the interim.  He will be transported by ground ambulance as soon as possible.  Patient remains vitally stable.          Disclaimer: This note consists of words and symbols derived from keyboarding and dictation using voice recognition software.  As a result, there may be errors that have gone undetected.  Please consider this when interpreting information found in this note.       Emily Parra MD  08/11/19 [id number]

## 2019-08-11 NOTE — ED NOTES
Patient was concerned about ability to use restroom before he is transferred. Discussion was had that when he was ready the Technician would escort him to restroom and patient has accepted that plan.

## 2019-08-11 NOTE — ED TRIAGE NOTES
"Presents via EMS for concerns of rectal bleeding and black stools since last night.  Reports increasing weakness. Alert and oriented. Wife reports patient took milk of mag two nights ago and had a hard bowel movement and has had  \"more than just drops of bright red blood in the toilet\" since. Patient also taking plavix.  "

## 2019-08-17 PROBLEM — M62.81 GENERALIZED MUSCLE WEAKNESS: Status: ACTIVE | Noted: 2019-01-01

## 2019-08-17 NOTE — LETTER
Transition Communication Hand-off for Care Transitions to Next Level of Care Provider    Name: Aston Linn  : 1926  MRN #: [id number]  Primary Care Provider: Corky Yang MD     Primary Clinic: [address] [place] [address]     Reason for Hospitalization:  Generalized weakness [R53.1]  Anemia due to blood loss, acute [D62]  Gastrointestinal hemorrhage, unspecified gastrointestinal hemorrhage type [K92.2]  Admit Date/Time: 2019  9:38 PM  Discharge Date: 19  Payor Source: Payor: [facility] / Plan: ARE MEDICARE [facility] / Product Type: HMO /     Readmission Assessment Measure (JENNIFER) Risk Score/category: N/A - OBS         Reason for Communication Hand-off Referral: Non-adherence to plan of care related to:  Other Changing to Hospice Care    Discharge Plan: [facility] Phone: [phone]     Concern for non-adherence with plan of care:   Y/N : no    Discharge Needs Assessment:  Needs      Most Recent Value   # of Referrals Placed by [facility]  Internal Clinic Care Coordination, Hospice   Hospice  [facility] [phone], Fax: [phone]        Follow-up plan:    Future Appointments   Date Time Provider Department Center   2019 10:30 AM Corky Yang MD [facility] [place]       Any outstanding tests or procedures:        Referrals     Future Labs/Procedures    HOSPICE REFERRAL     Comments:    **Order classes of: [facility], [facility] and [facility] will route to the Home Care and Hospice Referral Pool.  Home Care or Hospice will then contact the patient to schedule their appointment.**    Hospice eligibility overview: prognosis of 6 months or less, end stage disease, patient goals are comfort only, patient is not seeking curative treatment.    If you do not hear from Hospice, or you would like to call to schedule, please call the referring place of service that your provider has listed  below.  ______________________________________________________________________    Your provider has referred you to: [facility]: [facility] [phone]   [email or web address]    Anticipated discharge date 8/18/2019. Homecare to begin within 48 hours of discharge.  PLEASE Schedule Hospice consult for 24 - 48 hours.  Please call if there is need for a variance to this timeline.    REASON FOR REFERRAL: Hospice Diagnosis: persistent GI bleeding with blood loss anemia and Other significant diagnosis that affect terminal prognosis: see problem list    ADDITIONAL SERVICES NEEDED:     OTHER PERTINENT INFORMATION: Patient was last seen by provider on 8/18/2019 for hospital discharge.  Factors that indicate prognosis of less than 6 months:  Weight loss: recent 10 lb loss  Functional decline: worsening mobility  End stage disease: GI bleeding without desire to purse other diagnostic testing or aggressive intervention  Intractable symptoms: weakness, foot and leg pain (neuropathy)    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     Type 2 diabetes mellitus with diabetic neuropathy, without long-term current use of insulin (H)     Peripheral vascular disease (H)     Onychodystrophy     CKD (chronic kidney disease) stage 4, 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 This patient will be followed by a physician who will periodically review the plan of care.    Physician/Provider to provide follow up care: Corky Yang    [facility] certified Physician at time of discharge: Elliott Doll MD    Please be aware that coverage of these services is subject to the terms and limitations of your health insurance plan.  Call member services at your health plan with any benefit or coverage questions.            Key Recommendations:  Change in status for patient.  Patient going home with family support and [facility].    DENIA Hernandez  [facility] [phone]/ [place] [phone]      AVS/Discharge Summary is the source of truth; this is a helpful guide for improved communication of patient story

## 2019-08-18 PROBLEM — K92.2 GI BLEED: Status: ACTIVE | Noted: 2019-01-01

## 2019-08-18 PROBLEM — I71.43 ANEURYSM OF INFRARENAL ABDOMINAL AORTA (H): Status: ACTIVE | Noted: 2019-01-01

## 2019-08-18 PROBLEM — Z86.0100 HISTORY OF COLON POLYPS: Status: ACTIVE | Noted: 2019-01-01

## 2019-08-18 PROBLEM — K57.30 DIVERTICULOSIS OF LARGE INTESTINE: Status: ACTIVE | Noted: 2019-01-01

## 2019-08-18 NOTE — PROGRESS NOTES
S-(situation): Patient discharged to home via personal vehicle with family    B-(background): GI bleed/anemia    A-(assessment): Pt is alert and oriented. Vitals stable. Pt is requesting to go home on hospice. Hospice order placed.     R-(recommendations): Discharge instructions reviewed with pt, wife and daughter. Listed belongings gathered and returned to patient. Inhaler sent home with pt.          Discharge Nursing Criteria:     Care Plan and Patient education resolved: Yes    New Medications- pt has been educated about purpose and side effects: Not Applicable    Vaccines  Influenza status verified at discharge:  No          MISC  Prescriptions if needed, hard copies sent with patient  NA  Home and hospital aquired medications returned to patient: Yes  Medication Bin checked and emptied on discharge Yes  Patient reports post-discharge pain management plan is effective: Yes

## 2019-08-18 NOTE — ASSESSMENT & PLAN NOTE
Chronic issue, history of previous ulcer on foot which is healed  Because of GI bleeding, for now we will stop Plavix

## 2019-08-18 NOTE — ED NOTES
Pt's wife states pt manages his own meds, but pt does not have any idea what he takes or when he takes what.  Wife states that is just how it has always been.  Suggested that perhaps it may be time to get some help with meds.

## 2019-08-18 NOTE — PROGRESS NOTES
S-(situation): Patient registered to Observation. Patient arrived to room 265 via cart from ED    B-(background): Generalized weakness, GI bleed.  Hbg 7.7    A-(assessment): VSS, afebrile.  Pt tolerated transfusion, 1 unit PRBCs completed upon arrival to M/S floor.  Pt is pale, unsteady on feet.  Denies dizziness, lightheadedness.  Up with assist of 2 staff, gait belt.    R-(recommendations): Orders and observation goals reviewed with patient and family    Nursing Observation criteria listed below was met:    Skin issues/needs documented:Yes multiple scabs, bruising  Isolation needs addressed, if appropriate: NA  Fall Prevention: Education given and documented: Yes  Education Assessment documented:Yes  Education Documented (Pre-existing chronic infection such as, MRSA/VRE need education on admission): Yes  OBS video/handout Reviewed & DocumentedYes, in ED  Medication Reconciliation Complete: No unable to complete.  Patient and spouse unable to recall meds, dosing.  New medication patient education completed and documented (Possible Side Effects of Common Medications handout): Yes  Home medications if not able to send immediately home with family stored here: NA  Reminder note placed in discharge instructions: NA  Patient has discharge needs (If yes, please explain): unknown

## 2019-08-18 NOTE — ED NOTES
ED Nursing criteria listed below was addressed during verbal handoff:     Abnormal vitals: No  Abnormal results: Yes  Med Reconciliation completed: No  Meds given in ED: Yes  Any Overdue Meds: No  Core Measures: N/A  Isolation: N/A  Special needs: Yes, cane  Skin assessment: Yes, back of right arm has skin injury and right lower leg had bandage.     Observation Patient  Education provided: Yes

## 2019-08-18 NOTE — CONSULTS
CARE TRANSITION SOCIAL WORK INITIAL ASSESSMENT:      Met with: Patient and Family.    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       Resources List: Hospice       Description of Support System: Involved, Supportive   Who is your support system?: Wife, Children       Insurance Concerns: No Insurance issues identified     This writer met with pt, his wife, Kamille, and his daughter, Yariel, introduced self and role. Discussed discharge planning and medicare guidelines in regards to home care and SNF benefits.  Patient lives in his own home with his wife, Kamille.  They have 7 children.  One son lives next door.  Currently, patient does not receive any services in the home. There are 4 steps to get into the home and then he can remain on one level.  Patient has a scooter that he uses outside of the home.  Writer had discussion with patient/family about resources including home care and hospice.  Patient is stating that he does not want further treatment and wants to pursue hospice.  Offered choices to patient/family.  They chose [facility] Phone: [phone].       PLAN    Home with -Hospice    DENIA Hernandez  [facility] [phone]/ [facility] [phone]

## 2019-08-18 NOTE — DISCHARGE SUMMARY
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             Discharge Disposition   Referred to hospice care.   Agency: [facility].  Discharged to home  Condition at discharge: Stable    Hospital Course   89-year-old man with multiple complex chronic medical problems including peripheral artery disease, infrarenal abdominal aortic aneurysm, previous stroke treated with Plavix chronically, type 2 diabetes with painful peripheral neuropathy and severe chronic kidney disease, and recent hospitalization for GI bleeding with unknown source of bleeding presented with worsening weakness.  Due to concern for worsening symptomatic acute blood loss anemia from ongoing GI bleeding, he was hospitalized for observation.  He was transfused 1 unit packed red blood cells with initial improvement in hemoglobin from 7.7 to 8.9.  Stool was Hemoccult positive and dark in color.  Hemoglobin began to drift down to 8.3 by  discharge.  He was treated with Protonix.  Chronic antihypertensive medications and Plavix were discontinued because he was normotensive and because of concern about GI bleeding respectively.  He was not hypoglycemic, blood blood sugars were less than 100, so he was advised to discontinue glipizide.  He expressed clear preference to avoid aggressive diagnostic evaluation and interventions and discharge home.  After further discussion with the patient and his family, he expressed preference for end-of-life care which appeared medically appropriate in the context of persistent GI bleeding, recurring blood loss anemia, and multiple complex comorbid medical problems, advanced age, and recent weight loss and functional decline.  Referral to hospice was provided at discharge for these reasons.    Consultations This Hospital Stay   CARE TRANSITION RN/SW IP CONSULT    Code Status   DNR/DNI    Time Spent on this Encounter   I, Elliott Doll, personally saw the patient today and spent greater than 30 minutes discharging this patient.       Elliott Doll MD  [facility]  ______________________________________________________________________    Physical Exam   Vital Signs: Temp: 97.8  F (36.6  C) Temp src: Oral BP: 134/56 Pulse: 77 Heart Rate: 77 Resp: 20 SpO2: 91 % O2 Device: None (Room air) Oxygen Delivery: 2 LPM  Weight: 194 lbs 10.66 oz  General Appearance: Appears pale, presently sitting up in a chair without acute distress  Cardiovascular: Regular rate and rhythm  Other: Alert and maintains wakefulness and attention, telling stories about his past, speech is understandable       Primary Care Physician   Corky Yang MD    Discharge Orders      HOSPICE REFERRAL      Reason for your hospital stay    Hospitalized due to weakness and worsening anemia and improved     Follow-up and recommended labs and tests     Follow up with primary care provider, Corky Yang MD, within  3 days for hospital follow- up as scheduled.     Activity    Your activity upon discharge: activity as tolerated     Diet    Follow this diet upon discharge: Orders Placed This Encounter      Regular Diet Adult       Significant Results and Procedures   Most Recent 3 CBC's:  Recent Labs   Lab Test 08/18/19  1146 08/18/19  0537 08/17/19 2157 08/11/19  1305   WBC  --  9.8 9.6 9.2   HGB 8.3* 8.9* 7.7* 8.0*   MCV  --  90 92 91   PLT  --  369 369 356     Most Recent 3 BMP's:  Recent Labs   Lab Test 08/18/19  0537 08/17/19 2157 08/11/19  1305    141 143   POTASSIUM 4.4 4.7 4.8   CHLORIDE 110* 109 111*   CO2 23 24 21   BUN 58* 62* 86*   CR 2.31* 2.47* 2.51*   ANIONGAP 8 8 11   RA 8.1* 8.1* 8.3*   GLC 79 90 113*     Most Recent 3 Hemoglobins:  Recent Labs   Lab Test 08/18/19  1146 08/18/19 0537 08/17/19 2157   HGB 8.3* 8.9* 7.7*   ,   Results for orders placed or performed during the hospital encounter of 08/17/19   XR Chest Port 1 View    Narrative    CHEST PORTABLE ONE VIEW    8/17/2019 10:44 PM     INDICATION: Productive cough.    COMPARISON: 8/11/2019.      Impression    IMPRESSION: No new focal airspace disease or other acute findings.  Emphysema. Mild linear atelectasis or scarring at the left base.  Normal heart size. Postoperative changes lower C-spine.     MICAH BOUDREAUX MD       Discharge Medications   Current Discharge Medication List      CONTINUE these medications which have NOT CHANGED    Details   Olodaterol HCl 2.5 MCG/ACT AERS Inhale 1 puff into the lungs daily  Qty: 4 g, Refills: 0    Associated Diagnoses: COPD exacerbation (H)      pantoprazole (PROTONIX) 40 MG EC tablet TAKE ONE TABLET BY MOUTH ONCE DAILY  Qty: 90 tablet, Refills: 2    Associated Diagnoses: Gastroesophageal reflux disease without esophagitis      tiotropium-olodaterol 2.5-2.5 MCG/ACT AERS Inhale 2 puffs into the lungs every morning      !! ACCU-CHEK BAL PLUS test strip USE TO TEST BLOOD SUGARS 1-3 TIMES DAILY AS  DIRECTED.  Qty: 100 each, Refills: 3    Associated Diagnoses: Diabetic polyneuropathy associated with type 2 diabetes mellitus (H)      albuterol (PROAIR HFA/PROVENTIL HFA/VENTOLIN HFA) 108 (90 Base) MCG/ACT inhaler Inhale 2 puffs into the lungs every 6 hours as needed for shortness of breath / dyspnea or wheezing  Qty: 1 Inhaler, Refills: 1    Associated Diagnoses: Chronic obstructive pulmonary disease, unspecified COPD type (H); COPD exacerbation (H)      !! blood glucose (ONETOUCH ULTRA) test strip USE TO TEST BLOOD SUGAR THREE TIMES DAILY  Qty: 100 each, Refills: 1    Associated Diagnoses: Type 2 diabetes mellitus with diabetic neuropathy, without long-term current use of insulin (H)      blood glucose monitoring (NO BRAND SPECIFIED) meter device kit Use to test blood sugar 3 times daily or as directed.  Qty: 1 kit, Refills: 0    Comments: Please give brand that is covered by INS  Associated Diagnoses: Type 2 diabetes mellitus with diabetic neuropathy, without long-term current use of insulin (H)      Magnesium Hydroxide (MILK OF MAGNESIA PO) Take by mouth as needed      STATIN NOT PRESCRIBED, INTENTIONAL, Statin not prescribed intentionally due to Intolerance  Qty: 0 each, Refills: 0    Associated Diagnoses: Type 2 diabetes, HbA1C goal < 8% (H)      thin (NO BRAND SPECIFIED) lancets Used to test blood sugar 3 times daily  Qty: 100 each, Refills: 1    Associated Diagnoses: Type 2 diabetes mellitus with diabetic neuropathy, without long-term current use of insulin (H)       !! - Potential duplicate medications found. Please discuss with provider.      STOP taking these medications       amLODIPine (NORVASC) 10 MG tablet Comments:   Reason for Stopping:         carvedilol (COREG) 6.25 MG tablet Comments:   Reason for Stopping:         furosemide (LASIX) 20 MG tablet Comments:   Reason for Stopping:         glipiZIDE (GLUCOTROL) 10 MG tablet Comments:   Reason for Stopping:         hydrochlorothiazide (HYDRODIURIL)  25 MG tablet Comments:   Reason for Stopping:         losartan (COZAAR) 100 MG tablet Comments:   Reason for Stopping:         PLAVIX 75 MG OR TABS Comments:   Reason for Stopping:             Allergies   Allergies   Allergen Reactions     No Known Allergies

## 2019-08-18 NOTE — ED NOTES
Bed: ED12  Expected date:   Expected time:   Means of arrival:   Comments:  EMS - 89yr old - weakness.

## 2019-08-18 NOTE — H&P
[facility]    History and Physical - Hospitalist Service       Date of Admission:  8/17/2019    Assessment & Plan   Aston Linn is a 89 year old male admitted on 8/17/2019. He presents from home with his wife with concerns of weakness.  Recently hospitalized and discharged from [facility] after being evaluated for a GI bleed.  Upper endoscopy demonstrated Schatzki's ring with no obvious bleeding.  He refused prep and colonoscopy.  This felt likely that the source of the bleed was the colon, but his hemoglobin was stable and because he refused any further intervention he was discharged home with planned outpatient follow-up.  He continues to have somewhat dark stools.  He does take Plavix daily for history of stroke and peripheral artery disease.  Evaluation in the emergency department showed stable vital signs.  Stool was guaiac positive with hemoglobin low at 7.7.  After discussion with him and wife he was determined to give him a unit of blood and to perform serial hemoglobins.  We discussed the pros and cons of Plavix, but in this case it seems reasonable to stop the Plavix at this time.  We will watch him overnight, and after blood transfusion if his hemoglobin is stable, he can likely be discharged tomorrow with outpatient follow-up.    Acute blood loss anemia  Presenting with weakness with a hemoglobin low at 7.7.  Hospitalized last week at [facility] for GI bleeding with discharge hemoglobin at 8.5.  Upper endoscopy demonstrated Schatzki's ring with no obvious source of bleeding.  He was felt he probably had bleeding from the colon, but refused prep and colonoscopy.  He was discharged home on Plavix which he takes chronically for history of stroke and peripheral artery disease.  This time his vitals are stable, hemoglobin low at 7.7 with guaiac positive stool  The plan is to transfuse him 1 unit of blood, and stop his Plavix.  If stable, can likely be  discharged with outpatient follow-up    Hemorrhage of gastrointestinal tract  Likely from the colon with history of ongoing Plavix use  Patient is refusing colonoscopy or any type of intervention  Transfuse 1 unit of blood, monitor for stability and discharge    Generalized muscle weakness  Likely due to anemia  Transfuse, monitor, if stable home    Hypertension goal BP (blood pressure) < 130/80  Controlled at home taking Norvasc, hydrochlorothiazide and losartan  Continue home dosing    CKD (chronic kidney disease) stage 3, GFR 30-59 ml/min (H)  Stable  Monitor as outpatient    COPD (chronic obstructive pulmonary disease) (H)  No current symptoms  Continue home dosings, [facility]    Type 2 diabetes mellitus with diabetic nephropathy (H)  Controlled at home taking glipizide  Continue home dosing    Peripheral vascular disease (H)  Chronic issue, history of previous ulcer on foot which is healed  Because of GI bleeding, for now we will stop Plavix    Tobacco use disorder  Ongoing smoking, no desire to stop    ACP (advance care planning)  DNR/DNI per  his request         Diet: regular  DVT Prophylaxis: observation status  Chau Catheter: not present  Code Status: DNR/DNI    Disposition Plan   Expected discharge: Tomorrow, recommended to prior living arrangement once hemoglobin stable and Weakness improved.  Entered: Chano Verma MD 08/17/2019, 11:47 PM     The patient's care was discussed with the Patient and Patient's wife.    Chano Verma MD  [facility]    ______________________________________________________________________    Chief Complaint   89-year-old male concerned with weakness    History is obtained from the patient, electronic health record, emergency department physician and patient's spouse    History of Present Illness   Aston Linn is a 89 year old male who presents to the ER via EMS with his wife with concerns of weakness.  He been seen and  hospitalized at [facility] last week after some GI bleeding.  At that time had been transferred to [facility] where they performed an upper endoscopy which showed a Schatzki's ring, but no other source of bleeding.  His bleeding was felt to be lower GI in nature.  He refused colonoscopy and colonoscopy prep.  His hemoglobin was stable and he was discharged home.  He was sent home on Plavix which he takes chronically for history of previous stroke and history of peripheral artery disease.  Over the past few days he has been increasingly feeling weaker.  There is been no falls.  He denies chest pain, abdominal pain nausea or vomiting.  He notes that his stools at times have been black in color with no obvious blood.  He denies any urinary symptoms.  Patient has a history of a previous stroke with ongoing expressive aphasia.  He is otherwise quite active, continues to smoke cigarettes and has a history of diabetes controlled taking glipizide.  Has history of COPD, but denies any recent shortness of breath or cough.    Review of Systems    The 10 point Review of Systems is negative other than noted in the HPI or here.     Past Medical History    I have reviewed this patient's medical history and updated it with pertinent information if needed.   Past Medical History:   Diagnosis Date     Aortic aneurysm (H)      Blood transfusion      Carotid artery stenosis     80% lesion on left     CKD (chronic kidney disease) stage 3, GFR 30-59 ml/min (H)      COPD (chronic obstructive pulmonary disease) (H)      Diabetic eye exam (H) 03/29/11     Diabetic eye exam (H) 02/12/2015    Dr Attila Roper     Emphysema of lung (H)      Hypertension      Peptic ulcer disease with hemorrhage      TIA (transient ischaemic attack)      Type II or unspecified type diabetes mellitus without mention of complication, not stated as uncontrolled        Past Surgical History   I have reviewed this patient's surgical history and  updated it with pertinent information if needed.  Past Surgical History:   Procedure Laterality Date     AMPUTATION      PARTIAL LEFT INDEX FINGERTIP     APPENDECTOMY       COLONOSCOPY  11/15/2010    COMBINED COLONOSCOPY, SINGLE BIOPSY/POLYPECTOMY BY BIOPSY performed by JUDSON TOWNSEND at  GI     COLONOSCOPY  11/15/2010    COMBINED COLONOSCOPY, REMOVE TUMOR/POLYP/LESION BY SNARE performed by JUDSON TOWNSEND at  GI     ESOPHAGOSCOPY, GASTROSCOPY, DUODENOSCOPY (EGD), COMBINED  10/28/2010    COMBINED ESOPHAGOSCOPY, GASTROSCOPY, DUODENOSCOPY (EGD) performed by MORE ROBISON at  OR     HC REMV CATARACT EXTRACAP,INSERT LENS  06    right eye     HC REMV CATARACT EXTRACAP,INSERT LENS  2006    left eye     HEAD & NECK SURGERY      CERVICAL SPINE     ORTHOPEDIC SURGERY      CERVICAL FUSION       Social History   I have reviewed this patient's social history and updated it with pertinent information if needed.  Social History     Tobacco Use     Smoking status: Current Some Day Smoker     Packs/day: 0.25     Years: 60.00     Pack years: 15.00     Types: Cigarettes     Smokeless tobacco: Never Used   Substance Use Topics     Alcohol use: No     Drug use: No       Family History   I have reviewed this patient's family history and updated it with pertinent information if needed.   Family History   Problem Relation Age of Onset     Diabetes Mother      Cancer Sister         brain       Prior to Admission Medications   Prior to Admission Medications   Prescriptions Last Dose Informant Patient Reported? Taking?   ACCU-CHEK BAL PLUS test strip   No No   Sig: USE TO TEST BLOOD SUGARS 1-3 TIMES DAILY AS DIRECTED.   ASPIRIN NOT PRESCRIBED, INTENTIONAL,   No No   Si each continuous prn for other Antiplatelet medication not prescribed intentionally due to Uncontrolled hypertension (systolic > 180, diastolic > 100) and Current thienopryridine therapy   Magnesium Hydroxide (MILK OF MAGNESIA PO)   Yes No    Sig: Take by mouth as needed   Olodaterol HCl 2.5 MCG/ACT AERS   No No   Sig: Inhale 1 puff into the lungs daily   PLAVIX 75 MG OR TABS   Yes No   Si TABLET DAILY   STATIN NOT PRESCRIBED, INTENTIONAL,   No No   Sig: Statin not prescribed intentionally due to Intolerance   albuterol (PROAIR HFA/PROVENTIL HFA/VENTOLIN HFA) 108 (90 Base) MCG/ACT inhaler   No No   Sig: Inhale 2 puffs into the lungs every 6 hours as needed for shortness of breath / dyspnea or wheezing   amLODIPine (NORVASC) 10 MG tablet   No No   Sig: Take 1 tablet (10 mg) by mouth daily   blood glucose (ONETOUCH ULTRA) test strip   No No   Sig: USE TO TEST BLOOD SUGAR THREE TIMES DAILY   blood glucose monitoring (NO BRAND SPECIFIED) meter device kit   No No   Sig: Use to test blood sugar 3 times daily or as directed.   carvedilol (COREG) 6.25 MG tablet Unknown at Unknown time  No No   Sig: Take 0.5 tablets (3.125 mg) by mouth 2 times daily (with meals)   furosemide (LASIX) 20 MG tablet   No No   Sig: Take 1 tablet (20 mg) by mouth daily   glipiZIDE (GLUCOTROL) 10 MG tablet   Yes No   Sig: Take 1 tablet (10 mg) by mouth daily   losartan (COZAAR) 100 MG tablet Unknown at Unknown time  No No   Sig: Take 1 tablet (100 mg) by mouth daily   pantoprazole (PROTONIX) 40 MG EC tablet   No No   Sig: TAKE ONE TABLET BY MOUTH ONCE DAILY   predniSONE (DELTASONE) 20 MG tablet   No No   Sig: Take 40 mg by mouth daily for 4 days.   thin (NO BRAND SPECIFIED) lancets   No No   Sig: Used to test blood sugar 3 times daily   tiotropium-olodaterol 2.5-2.5 MCG/ACT AERS   Yes Yes   Sig: Inhale 2 puffs into the lungs every morning      Facility-Administered Medications: None     Allergies   Allergies   Allergen Reactions     No Known Allergies        Physical Exam   Vital Signs: Temp: 97.8  F (36.6  C) Temp src: Oral BP: (!) 160/86 Pulse: 81 Heart Rate: 88 Resp: 20 SpO2: 95 % O2 Device: None (Room air)    Weight: 201 lbs 0 oz    Constitutional: awake, alert, cooperative,  no apparent distress, and appears stated age and pale  Eyes: Lids and lashes normal, pupils equal, round and reactive to light, extra ocular muscles intact, sclera clear, conjunctiva normal  ENT: Normocephalic, without obvious abnormality, atraumatic, sinuses nontender on palpation, external ears without lesions, oral pharynx with moist mucous membranes, tonsils without erythema or exudates, gums normal and good dentition.  Hematologic / Lymphatic: no cervical lymphadenopathy and no supraclavicular lymphadenopathy  Respiratory: No increased work of breathing, good air exchange, clear to auscultation bilaterally, no crackles or wheezing  Cardiovascular: regular rate and rhythm  GI: normal bowel sounds, soft, non-distended and non-tender  Skin: no bruising or bleeding and normal skin color, texture, turgor  Musculoskeletal: There is no redness, warmth, or swelling of the joints.  Full range of motion noted.  Motor strength is 5 out of 5 all extremities bilaterally.  Tone is normal.  Neurologic: Awake, alert, oriented to name, place Mild expressive aphasia.  Cranial nerves II-XII are grossly intact.  Motor is 5 out of 5 bilaterally.      Data   Data reviewed today: I reviewed all medications, new labs and imaging results over the last 24 hours. I personally reviewed no images or EKG's today.    Results for orders placed or performed during the hospital encounter of 08/17/19   XR Chest Port 1 View    Narrative    CHEST PORTABLE ONE VIEW    8/17/2019 10:44 PM     INDICATION: Productive cough.    COMPARISON: 8/11/2019.      Impression    IMPRESSION: No new focal airspace disease or other acute findings.  Emphysema. Mild linear atelectasis or scarring at the left base.  Normal heart size. Postoperative changes lower C-spine.    Occult blood stool   Result Value Ref Range    Occult Blood Positive (A) NEG^Negative   CBC with platelets differential   Result Value Ref Range    WBC 9.6 4.0 - 11.0 10e9/L    RBC Count 2.73 (L)  4.4 - 5.9 10e12/L    Hemoglobin 7.7 (L) 13.3 - 17.7 g/dL    Hematocrit 25.0 (L) 40.0 - 53.0 %    MCV 92 78 - 100 fl    MCH 28.2 26.5 - 33.0 pg    MCHC 30.8 (L) 31.5 - 36.5 g/dL    RDW 14.8 10.0 - 15.0 %    Platelet Count 369 150 - 450 10e9/L    Diff Method Automated Method     % Neutrophils 75.8 %    % Lymphocytes 10.5 %    % Monocytes 8.9 %    % Eosinophils 3.9 %    % Basophils 0.4 %    % Immature Granulocytes 0.5 %    Nucleated RBCs 0 0 /100    Absolute Neutrophil 7.2 1.6 - 8.3 10e9/L    Absolute Lymphocytes 1.0 0.8 - 5.3 10e9/L    Absolute Monocytes 0.9 0.0 - 1.3 10e9/L    Absolute Basophils 0.0 0.0 - 0.2 10e9/L    Abs Immature Granulocytes 0.1 0 - 0.4 10e9/L    Absolute Nucleated RBC 0.0    Comprehensive metabolic panel   Result Value Ref Range    Sodium 141 133 - 144 mmol/L    Potassium 4.7 3.4 - 5.3 mmol/L    Chloride 109 94 - 109 mmol/L    Carbon Dioxide 24 20 - 32 mmol/L    Anion Gap 8 3 - 14 mmol/L    Glucose 90 70 - 99 mg/dL    Urea Nitrogen 62 (H) 7 - 30 mg/dL    Creatinine 2.47 (H) 0.66 - 1.25 mg/dL    GFR Estimate 22 (L) >60 mL/min/[1.73_m2]    GFR Estimate If Black 25 (L) >60 mL/min/[1.73_m2]    Calcium 8.1 (L) 8.5 - 10.1 mg/dL    Bilirubin Total 0.3 0.2 - 1.3 mg/dL    Albumin 3.1 (L) 3.4 - 5.0 g/dL    Protein Total 6.8 6.8 - 8.8 g/dL    Alkaline Phosphatase 92 40 - 150 U/L    ALT 12 0 - 70 U/L    AST 9 0 - 45 U/L   CRP inflammation   Result Value Ref Range    CRP Inflammation 39.5 (H) 0.0 - 8.0 mg/L   ABO/Rh type and screen   Result Value Ref Range    Units Ordered 1     ABO A     RH(D) Pos     Antibody Screen Neg     Test Valid Only At [facility]        Specimen Expires 08/20/2019     Crossmatch Red Blood Cells    Blood component   Result Value Ref Range    Unit Number [id number]     Blood Component Type Red Blood Cells Leukocyte Reduced     Division Number 00     Status of Unit Released to care unit     Blood Product Code [id number]     Unit Status ISS

## 2019-08-18 NOTE — ASSESSMENT & PLAN NOTE
Likely from the colon with history of ongoing Plavix use  Patient is refusing colonoscopy or any type of intervention  Transfuse 1 unit of blood, monitor for stability and discharge

## 2019-08-18 NOTE — ED PROVIDER NOTES
History     Chief Complaint   Patient presents with     Generalized Weakness     HPI  Aston Linn is a 89 year old male who presents to the emergency department today with increasing generalized weakness after being discharged from [facility] for a GI bleed.  Patient was seen in our emergency room on August 11 and was transferred to [facility], he was discharged from there on the 13th.  Please refer to hospital course noted below.  Patient arrives here via EMS tonight as his family is concerned about his ongoing increasing weakness.  Patient denies any pain, patient is uncertain if he has been passing black tarry stool.  Patient is on Plavix and according to his discharge summary this was resumed given his stable hemoglobin while he was at [place].  Patient denies any vomiting.    Hospital Course   89-year-old male with significant PMHx of CKD stage 4, PAD and CVA who presented to an OSH for fatigue associated with black and bloody stools for weeks prior to presentation. In the ED he was found to be more anemic compared to baseline and had an YOSI on CKD so was transferred to [facility] for further evaluation. Wife report possible NSAID use at home and has a history of PUD in 2004. Family really wanted to avoid a colonoscopy so only an EGD was ordered initially. The EGD on 8/12 did not demonstrate an etiology for his bleed but did show a mild Schatzki ring. Another discussion with family occurred after the procedure. He would not have wanted any surgery or other treatments if a mass is found, however, he may benefit from cauterization so colonoscopy was recommended. Patient refused bowel prep overnight of 8/12 stating clearly he does not want colonoscopy, He was clear as to why he does not want colonoscopy, states he has seen at all and will not be interested in any procedure if colonoscopy were to be done. After extensive discussion with family and patient at bedside considering patient's  comorbidities and age and stability of hemoglobin without any acute intervention, colonoscopy was discontinued per patient's request. Over the course of hospitalization his hemoglobin has up trended and stable with last hemoglobin of 8.5 prior to discharge. Given no acute intervention planned and stability of hemoglobin patient was discharged home with plans for outpatient follow-up with his PCP and to have a repeat hemoglobin done in 1 week to establish stability. Patient and family was instructed to return to the emergency if acute bleeding recurs, they demonstrated understanding.      Acute on Chronic Anemia Likely lower GI bleed possible hemorrhoidal stable at discharge no futher bleeding episode reported prior to discharge, Hb uptrended to 8.5g/dl without any blood transfusion, EGD negative patient declined colonoscopy. Follow up with PCP in 1 week with repeat Hb to establish stability, patient and spouse educated to avoid NSAIDS at discharge, patient decline further blood draws during this hospitalization      YOSI on CKD stage 4 - Baseline Cr about 2 to 2.2. resumed home meds at discharge      H/o CVA and PAD resumed Plavix given stability of Hb and prior stroke history    COPD - stable. PRN albuterol    HLD - statin    Essential HTN Continue home amlodipine and carvedilol and losartan     DM 2 - A1c 6.8% in February resumed home Glipizide at discharge     PAD - resumed clopidogrel     Consultants  Hospitalist / Internal Medicine    Procedures performed   EGD  Impression:         - Mild Schatzki ring.                      - Nodular mucosa in the gastric fundus and in the gastric                       body.                      - Nodular mucosa in the first portion of the duodenum.                      - No specimens collected.                      - No cause for bleeding seen       Allergies:  Allergies   Allergen Reactions     No Known Allergies        Problem List:    Patient Active Problem List    Diagnosis  Date Noted     Iron deficiency anemia 10/28/2010     Priority: High     Acute blood loss anemia 10/27/2010     Priority: High     Hemorrhage of gastrointestinal tract 04/08/2004     Priority: High     Problem list name updated by automated process. Provider to review       Hypoxia 02/15/2019     Priority: Medium     Shortness of breath 02/15/2019     Priority: Medium     Open wound of heel, right, initial encounter 09/14/2018     Priority: Medium     CKD (chronic kidney disease) stage 4, GFR 15-29 ml/min (H) 09/06/2018     Priority: Medium     Peripheral vascular disease of lower extremity with ulceration (H) 09/06/2018     Priority: Medium     Type 2 diabetes mellitus with diabetic neuropathy, without long-term current use of insulin (H) 05/07/2018     Priority: Medium     Peripheral vascular disease (H) 05/07/2018     Priority: Medium     Onychodystrophy 05/07/2018     Priority: Medium     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  Document not previously scanned.  Validation form completed and sent with document to be scanned.  Code Status needs to be updated to reflect choices in most recent ACP document. Notification sent to Dr. KAMILAH Yang  for followup.  Confirmed/documented designated decision maker(s).  Added by Karli Bueno             Tobacco use disorder 11/21/2015     Priority: Medium     Type 2 diabetes mellitus with diabetic nephropathy (H) 09/10/2012     Priority: Medium     GERD (gastroesophageal reflux disease) 05/04/2012     Priority: Medium     CKD (chronic kidney disease) stage 3, GFR 30-59 ml/min (H) 10/27/2010     Priority: Medium     COPD (chronic obstructive pulmonary disease) (H) 10/27/2010     Priority: Medium     Left carotid artery stenosis 10/27/2010      Priority: Medium     Hyperlipidemia LDL goal <100 10/27/2010     Priority: Medium     Hypertension goal BP (blood pressure) < 130/80 01/02/2003     Priority: Medium     Anxiety state 12/27/2006     Priority: Low     Problem list name updated by automated process. Provider to review       Other specified transient cerebral ischemias 02/26/2003     Priority: Low        Past Medical History:    Past Medical History:   Diagnosis Date     Aortic aneurysm (H)      Blood transfusion      Carotid artery stenosis      CKD (chronic kidney disease) stage 3, GFR 30-59 ml/min (H)      COPD (chronic obstructive pulmonary disease) (H)      Diabetic eye exam (H) 03/29/11     Diabetic eye exam (H) 02/12/2015     Emphysema of lung (H)      Hypertension      Peptic ulcer disease with hemorrhage      TIA (transient ischaemic attack)      Type II or unspecified type diabetes mellitus without mention of complication, not stated as uncontrolled        Past Surgical History:    Past Surgical History:   Procedure Laterality Date     AMPUTATION      PARTIAL LEFT INDEX FINGERTIP     APPENDECTOMY       COLONOSCOPY  11/15/2010    COMBINED COLONOSCOPY, SINGLE BIOPSY/POLYPECTOMY BY BIOPSY performed by JUDSON TOWNSEND at  GI     COLONOSCOPY  11/15/2010    COMBINED COLONOSCOPY, REMOVE TUMOR/POLYP/LESION BY SNARE performed by JUDSON TOWNSEND at  GI     ESOPHAGOSCOPY, GASTROSCOPY, DUODENOSCOPY (EGD), COMBINED  10/28/2010    COMBINED ESOPHAGOSCOPY, GASTROSCOPY, DUODENOSCOPY (EGD) performed by MORE ROBISON at  OR     HC REMV CATARACT EXTRACAP,INSERT LENS  07/20/06    right eye     HC REMV CATARACT EXTRACAP,INSERT LENS  8/17/2006    left eye     HEAD & NECK SURGERY      CERVICAL SPINE     ORTHOPEDIC SURGERY      CERVICAL FUSION       Family History:    Family History   Problem Relation Age of Onset     Diabetes Mother      Cancer Sister         brain       Social History:  Marital Status:   [2]  Social History     Tobacco Use      Smoking status: Current Some Day Smoker     Packs/day: 0.25     Years: 60.00     Pack years: 15.00     Types: Cigarettes     Smokeless tobacco: Never Used   Substance Use Topics     Alcohol use: No     Drug use: No        Medications:      ACCU-CHEK BAL PLUS test strip   albuterol (PROAIR HFA/PROVENTIL HFA/VENTOLIN HFA) 108 (90 Base) MCG/ACT inhaler   amLODIPine (NORVASC) 10 MG tablet   ASPIRIN NOT PRESCRIBED, INTENTIONAL,   blood glucose (ONETOUCH ULTRA) test strip   blood glucose monitoring (NO BRAND SPECIFIED) meter device kit   carvedilol (COREG) 6.25 MG tablet   furosemide (LASIX) 20 MG tablet   glipiZIDE (GLUCOTROL) 10 MG tablet   losartan (COZAAR) 100 MG tablet   Magnesium Hydroxide (MILK OF MAGNESIA PO)   pantoprazole (PROTONIX) 40 MG EC tablet   PLAVIX 75 MG OR TABS   STATIN NOT PRESCRIBED, INTENTIONAL,   thin (NO BRAND SPECIFIED) lancets         Review of Systems   All other systems reviewed and are negative.      Physical Exam   BP: (!) 175/79  Heart Rate: 88  Temp: 98.6  F (37  C)  Resp: 20  Height: 182.9 cm (6')  Weight: 91.2 kg (201 lb)  SpO2: 91 %      Physical Exam   Constitutional: He appears well-developed. No distress.   Very tired lethargic and pale appearing male lying on the bed in no acute distress   HENT:   Head: Normocephalic.   Mucous membranes are dry and pale   Eyes: EOM are normal.   Neck: Normal range of motion.   Cardiovascular: Normal rate.   Pulmonary/Chest: Effort normal. No respiratory distress.   Overall poor inspiratory effort   Abdominal: Soft. Bowel sounds are normal. There is no tenderness.   Hyperactive bowel sounds   Neurological:   Sleepy, responds to verbal stimuli   Skin: Capillary refill takes 2 to 3 seconds. He is not diaphoretic. There is pallor (Significant pallor).       ED Course        Procedures    Results for orders placed or performed during the hospital encounter of 08/17/19 (from the past 24 hour(s))   CBC with platelets differential   Result Value Ref Range     WBC 9.6 4.0 - 11.0 10e9/L    RBC Count 2.73 (L) 4.4 - 5.9 10e12/L    Hemoglobin 7.7 (L) 13.3 - 17.7 g/dL    Hematocrit 25.0 (L) 40.0 - 53.0 %    MCV 92 78 - 100 fl    MCH 28.2 26.5 - 33.0 pg    MCHC 30.8 (L) 31.5 - 36.5 g/dL    RDW 14.8 10.0 - 15.0 %    Platelet Count 369 150 - 450 10e9/L    Diff Method Automated Method     % Neutrophils 75.8 %    % Lymphocytes 10.5 %    % Monocytes 8.9 %    % Eosinophils 3.9 %    % Basophils 0.4 %    % Immature Granulocytes 0.5 %    Nucleated RBCs 0 0 /100    Absolute Neutrophil 7.2 1.6 - 8.3 10e9/L    Absolute Lymphocytes 1.0 0.8 - 5.3 10e9/L    Absolute Monocytes 0.9 0.0 - 1.3 10e9/L    Absolute Basophils 0.0 0.0 - 0.2 10e9/L    Abs Immature Granulocytes 0.1 0 - 0.4 10e9/L    Absolute Nucleated RBC 0.0    ABO/Rh type and screen   Result Value Ref Range    Units Ordered 1     ABO A     RH(D) Pos     Antibody Screen Neg     Test Valid Only At [facility]        Specimen Expires 08/20/2019     Crossmatch Red Blood Cells    Comprehensive metabolic panel   Result Value Ref Range    Sodium 141 133 - 144 mmol/L    Potassium 4.7 3.4 - 5.3 mmol/L    Chloride 109 94 - 109 mmol/L    Carbon Dioxide 24 20 - 32 mmol/L    Anion Gap 8 3 - 14 mmol/L    Glucose 90 70 - 99 mg/dL    Urea Nitrogen 62 (H) 7 - 30 mg/dL    Creatinine 2.47 (H) 0.66 - 1.25 mg/dL    GFR Estimate 22 (L) >60 mL/min/[1.73_m2]    GFR Estimate If Black 25 (L) >60 mL/min/[1.73_m2]    Calcium 8.1 (L) 8.5 - 10.1 mg/dL    Bilirubin Total 0.3 0.2 - 1.3 mg/dL    Albumin 3.1 (L) 3.4 - 5.0 g/dL    Protein Total 6.8 6.8 - 8.8 g/dL    Alkaline Phosphatase 92 40 - 150 U/L    ALT 12 0 - 70 U/L    AST 9 0 - 45 U/L   CRP inflammation   Result Value Ref Range    CRP Inflammation 39.5 (H) 0.0 - 8.0 mg/L   Blood component   Result Value Ref Range    Unit Number [id number]     Blood Component Type Red Blood Cells Leukocyte Reduced     Division Number 00     Status of Unit Ready for patient 08/17/2019 4093     Blood Product  Code [id number]     Unit Status RACHNA    Occult blood stool   Result Value Ref Range    Occult Blood Positive (A) NEG^Negative   XR Chest Port 1 View    Narrative    CHEST PORTABLE ONE VIEW    8/17/2019 10:44 PM     INDICATION: Productive cough.    COMPARISON: 8/11/2019.      Impression    IMPRESSION: No new focal airspace disease or other acute findings.  Emphysema. Mild linear atelectasis or scarring at the left base.  Normal heart size. Postoperative changes lower C-spine.        Medications   0.9% sodium chloride BOLUS (1,000 mLs Intravenous New Bag 8/17/19 0712)   pantoprazole (PROTONIX) 40 mg IV push injection (40 mg Intravenous Given 8/17/19 6577)       Assessments & Plan (with Medical Decision Making)  Asotn is a 89-year-old male who presents to the emergency department today with increased generalized weakness after being discharged from [facility] on the 13th after being evaluated for a GI bleed.  Please refer to HPI, focused exam and ED hospital course from [place].  Concern for ongoing GI bleed, occult stool is positive, hemoglobin has trended down to 7.7 today.  Patient was placed back on his Plavix which given his age in light of his ongoing bleeding I feel needs to be discontinued.  I discussed with his wife his issues today and she reports that when he attempted to do the colonoscopy at [facility] patient did not tolerate the prep and ultimately refused procedure and his wife reports that the GI doctor agreed that in light of patient's age it may be better to not proceed.  Patient has been ambulating at home with assist, he lives with his wife and his kids have been helping.  Wife does report that patient does have an outside toilet which he continues to prefer going to the bathroom outside versus inside.  Patient has had no recent falls.  I attempted to update patient on plan of care and discuss possibility of transfer versus keeping patient here given our lack of GI resources,  patient is very hard of hearing and really does not seem to understand the plan of care which may be secondary to his weakness and anemia.    Wife prefers to have patient stay here which I feel at this time is reasonable as he is R[place]to had an endoscopy and it is unlikely at this point that any further studies with regard to a colonoscopy would be performed.  Patient was given 1 unit of packed red cells, wife did sign the consent form after risks/benefits discussed, patient was given Protonix here in the ED.  His kidney function today is consistent with his chronic renal failure and essentially unchanged from his creatinine at [employer].  Coagulation studies are pending.  White count is stable.   will likely need to be involved with regard to disposition planning, and medical decision making going forward.  I discussed patient's case with our hospitalist, Dr. Verma he has graciously agreed to accept patient for admission.  Patient was staffed in the ED with Dr. Webster.     Of note I did obtain a chest x-ray as patient does have a frequent productive cough, no lobar infiltrate identified.     I have reviewed the nursing notes.    I have reviewed the findings, diagnosis, plan and need for follow up with the patient.    New Prescriptions    No medications on file       Final diagnoses:   Anemia due to blood loss, acute   Gastrointestinal hemorrhage, unspecified gastrointestinal hemorrhage type   Generalized weakness       8/17/2019   [facility] EMERGENCY DEPARTMENT     Amparo Arrieta, NICA CNP  08/17/19 7881

## 2019-08-18 NOTE — ASSESSMENT & PLAN NOTE
Presenting with weakness with a hemoglobin low at 7.7.  Hospitalized last week at [facility] for GI bleeding with discharge hemoglobin at 8.5.  Upper endoscopy demonstrated Schatzki's ring with no obvious source of bleeding.  He was felt he probably had bleeding from the colon, but refused prep and colonoscopy.  He was discharged home on Plavix which he takes chronically for history of stroke and peripheral artery disease.  This time his vitals are stable, hemoglobin low at 7.7 with guaiac positive stool  The plan is to transfuse him 1 unit of blood, and stop his Plavix.  If stable, can likely be discharged with outpatient follow-up

## 2019-08-19 NOTE — PROGRESS NOTES
In principle, I'm OK with this, but not comfortable following hospice until I've had a hospice discussion with patient.  If he shows to his appointment in 2 days, I can do this.  Since I've not seen him in over 6 months, I'm not willing to certify that he's ready for hospice without this planned face-to-face visit.

## 2019-08-19 NOTE — PROGRESS NOTES
Clinic Care Coordination Contact    Situation: Patient chart reviewed by care coordinator.    Background: patient was at [facility] from 8/17-8/18/19 for: Generalized weakness  Anemia due to blood loss, acute   Gastrointestinal hemorrhage, unspecified gastrointestinal hemorrhage type     Assessment: Change in status for patient.  Patient going home with family support and -Hospice.patient was referred to [facility].     Plan/Recommendations: per CC workflow, no CC RN outreach scheduled.     ELIER Davis RN Clinic Care Coordinator   [place], [place], [place]  Phone: [phone]

## 2019-08-19 NOTE — PROGRESS NOTES
[facility] utilizes an encounter to take the place of a direct phone call to your office. Please take a moment to review the below request. Please reply or route message to author of this encounter.  Message will act as a verbal OK of orders requested below. Thank you.    [facility] received a hospice referral from [facility] on your patient Aston.  Will you continue to follow as attending for hospice? If so hospice is requesting orders as below.     OK to admit to hospice. OK for hospice orders. OK for current meds and treatments.  MSW eval and treat and admit to hospice by hospice consult    If admitted admission nurse will contact you for on going hospice orders.     Any questions or concerns please contact me. Thank you for your care of this patient,    Yancy Orosco RN PHN PN   [facility]  Hospice Day Triage RN/Referral Navigator  [phone]  [email or web address]

## 2019-08-20 NOTE — TELEPHONE ENCOUNTER
RN to call for hospital follow up:    Reason for follow up: Aston Linn appeared on our list for being seen in an Emergency Room or a recent Hospital discharge.    Admitting date: 8/17/19  Discharge date: 8/18/19  Location: [facility]  Reason for visit:   Next 5 appointments (look out 90 days)    Aug 21, 2019 10:30 AM CDT  [facility] [address] [address] [phone]        Maribel Grubbs, RN, BSN

## 2019-08-20 NOTE — TELEPHONE ENCOUNTER
ED / Discharge Outreach Protocol    Patient Contact    Attempt # 1    Was call answered?  No.  Left message on voicemail with information to call me back.    Leno Phoenix, RN, BSN

## 2019-08-21 NOTE — TELEPHONE ENCOUNTER
ED / Discharge Outreach Protocol    Patient Contact    Attempt # 2    Was call answered?  No.  Left message on voicemail with information to call me back.    Leno Phoenix, RN, BSN        No future visit scheduled

## 2024-01-01 NOTE — PROGRESS NOTES
"Follow Up Progress Note      Assessment: Clinic Care Coordinator RN spoke with patient's wife. She states patient can not hear no phone.    Consent on file: Scan on 5/14/2012  4:02 PM by Bozena Blue: Auth to Discuss PHIScan on 5/14/2012  4:02 PM by Bozena Blue: Auth to Discuss PHI    Patient's wife reports patient is \"more tired today because of the weather\"  Denies increased sob, worsening cough or change in sputum . Clinic Care Coordinator RN educated patient's wife to call PCP is sx worsen.  Educated patient's wife on the important of taking medications as rx'd.  Patient's wife verbalized understanding. Patient's wife reports patient is taking medications as directed.     Patient's wife reports blood glucose reading are \"normal\" She does not know numbers. When asked she states they are not above 200.   Patient's wife denies other concerns.   Patient's wife agreed to follow up call next month.      Goals:   Goals        General    Medication 1 (pt-stated)     Notes - Note created  5/17/2019  2:39 PM by Bozena Zaman, RN    Goal Statement: I will take medications as prescribed    Measure of Success: completed  Supportive Steps to Achieve: wife verbalizes understanding   Barriers: na  Strengths: na  Date to Achieve By: life long goal   Patient's wife expressed understanding of goal: yes          Monitoring (pt-stated)     Notes - Note created  5/17/2019  2:37 PM by Bozena Zaman, RN    Goal Statement: I will call my PCP if I have worsen of my breathing , cough,  and wheezing. And or my sputum is yellow or green. Or I develop a fever.   Measure of Success: completed  Supportive Steps to Achieve: Patient's wife verbalizes understanding  Barriers: na  Strengths: desire to avoid hospitalization   Date to Achieve By: life time goal   Patient's wife expressed understanding of goal: yes                  Goal Progression: As of today's date 7/17/2019 goal is met at 76 - 100%.   Goal Status:  Active    "   Intervention/Education provided during outreach: see above     Outreach Frequency: monthly     Plan:   Care Coordinator will follow up next month      n/a